# Patient Record
Sex: MALE | Race: WHITE | Employment: OTHER | URBAN - METROPOLITAN AREA
[De-identification: names, ages, dates, MRNs, and addresses within clinical notes are randomized per-mention and may not be internally consistent; named-entity substitution may affect disease eponyms.]

---

## 2017-01-03 ENCOUNTER — CARE COORDINATION (OUTPATIENT)
Dept: INTERNAL MEDICINE | Age: 82
End: 2017-01-03

## 2017-01-05 ENCOUNTER — TELEPHONE (OUTPATIENT)
Dept: INTERNAL MEDICINE | Age: 82
End: 2017-01-05

## 2017-01-19 ENCOUNTER — CARE COORDINATION (OUTPATIENT)
Dept: INTERNAL MEDICINE | Age: 82
End: 2017-01-19

## 2017-02-16 ENCOUNTER — CARE COORDINATION (OUTPATIENT)
Dept: INTERNAL MEDICINE | Age: 82
End: 2017-02-16

## 2017-03-15 ENCOUNTER — OFFICE VISIT (OUTPATIENT)
Dept: INTERNAL MEDICINE | Age: 82
End: 2017-03-15

## 2017-03-15 VITALS — HEIGHT: 68 IN | HEART RATE: 72 BPM | SYSTOLIC BLOOD PRESSURE: 118 MMHG | DIASTOLIC BLOOD PRESSURE: 58 MMHG

## 2017-03-15 DIAGNOSIS — E78.5 HYPERLIPIDEMIA WITH TARGET LDL LESS THAN 100: ICD-10-CM

## 2017-03-15 DIAGNOSIS — I25.83 CORONARY ARTERY DISEASE DUE TO LIPID RICH PLAQUE: ICD-10-CM

## 2017-03-15 DIAGNOSIS — I48.91 ATRIAL FIBRILLATION, UNSPECIFIED TYPE (HCC): ICD-10-CM

## 2017-03-15 DIAGNOSIS — I48.92 ATRIAL FLUTTER, UNSPECIFIED TYPE (HCC): ICD-10-CM

## 2017-03-15 DIAGNOSIS — I48.91 ATRIAL FIBRILLATION AND FLUTTER (HCC): ICD-10-CM

## 2017-03-15 DIAGNOSIS — I65.23 CAROTID STENOSIS, BILATERAL: ICD-10-CM

## 2017-03-15 DIAGNOSIS — I48.92 ATRIAL FIBRILLATION AND FLUTTER (HCC): ICD-10-CM

## 2017-03-15 DIAGNOSIS — I10 ESSENTIAL HYPERTENSION: ICD-10-CM

## 2017-03-15 DIAGNOSIS — I25.10 CORONARY ARTERY DISEASE DUE TO LIPID RICH PLAQUE: ICD-10-CM

## 2017-03-15 PROCEDURE — 99214 OFFICE O/P EST MOD 30 MIN: CPT | Performed by: INTERNAL MEDICINE

## 2017-03-15 RX ORDER — ATORVASTATIN CALCIUM 80 MG/1
80 TABLET, FILM COATED ORAL DAILY
Qty: 30 TABLET | Refills: 11 | Status: SHIPPED | OUTPATIENT
Start: 2017-03-15 | End: 2018-04-29 | Stop reason: SDUPTHER

## 2017-03-15 ASSESSMENT — ENCOUNTER SYMPTOMS
GASTROINTESTINAL NEGATIVE: 1
SHORTNESS OF BREATH: 0

## 2017-03-15 ASSESSMENT — PATIENT HEALTH QUESTIONNAIRE - PHQ9
2. FEELING DOWN, DEPRESSED OR HOPELESS: 0
SUM OF ALL RESPONSES TO PHQ9 QUESTIONS 1 & 2: 0
1. LITTLE INTEREST OR PLEASURE IN DOING THINGS: 0
SUM OF ALL RESPONSES TO PHQ QUESTIONS 1-9: 0

## 2017-03-23 ENCOUNTER — CARE COORDINATION (OUTPATIENT)
Dept: INTERNAL MEDICINE | Age: 82
End: 2017-03-23

## 2017-04-10 ENCOUNTER — CARE COORDINATION (OUTPATIENT)
Dept: OTHER | Facility: CLINIC | Age: 82
End: 2017-04-10

## 2017-04-18 ENCOUNTER — CARE COORDINATION (OUTPATIENT)
Dept: OTHER | Facility: CLINIC | Age: 82
End: 2017-04-18

## 2017-04-24 ENCOUNTER — CARE COORDINATION (OUTPATIENT)
Dept: OTHER | Facility: CLINIC | Age: 82
End: 2017-04-24

## 2017-05-01 ENCOUNTER — CARE COORDINATION (OUTPATIENT)
Dept: OTHER | Facility: CLINIC | Age: 82
End: 2017-05-01

## 2017-05-08 ENCOUNTER — CARE COORDINATION (OUTPATIENT)
Dept: OTHER | Facility: CLINIC | Age: 82
End: 2017-05-08

## 2017-05-15 ENCOUNTER — CARE COORDINATION (OUTPATIENT)
Dept: OTHER | Facility: CLINIC | Age: 82
End: 2017-05-15

## 2017-05-22 ENCOUNTER — CARE COORDINATION (OUTPATIENT)
Dept: OTHER | Facility: CLINIC | Age: 82
End: 2017-05-22

## 2017-05-30 ENCOUNTER — CARE COORDINATION (OUTPATIENT)
Dept: OTHER | Facility: CLINIC | Age: 82
End: 2017-05-30

## 2017-06-12 ENCOUNTER — CARE COORDINATION (OUTPATIENT)
Dept: OTHER | Facility: CLINIC | Age: 82
End: 2017-06-12

## 2017-06-14 ENCOUNTER — TELEPHONE (OUTPATIENT)
Dept: INTERNAL MEDICINE | Age: 82
End: 2017-06-14

## 2017-06-16 ENCOUNTER — CARE COORDINATION (OUTPATIENT)
Dept: CASE MANAGEMENT | Age: 82
End: 2017-06-16

## 2017-06-19 ENCOUNTER — TELEPHONE (OUTPATIENT)
Dept: INTERNAL MEDICINE | Age: 82
End: 2017-06-19

## 2017-06-21 ENCOUNTER — TELEPHONE (OUTPATIENT)
Dept: INTERNAL MEDICINE | Age: 82
End: 2017-06-21

## 2017-06-26 ENCOUNTER — CARE COORDINATION (OUTPATIENT)
Dept: CASE MANAGEMENT | Age: 82
End: 2017-06-26

## 2017-07-03 ENCOUNTER — CARE COORDINATION (OUTPATIENT)
Dept: CASE MANAGEMENT | Age: 82
End: 2017-07-03

## 2017-07-05 ENCOUNTER — OFFICE VISIT (OUTPATIENT)
Dept: INTERNAL MEDICINE | Age: 82
End: 2017-07-05

## 2017-07-05 VITALS — HEART RATE: 76 BPM | SYSTOLIC BLOOD PRESSURE: 100 MMHG | DIASTOLIC BLOOD PRESSURE: 58 MMHG

## 2017-07-05 DIAGNOSIS — H61.23 BILATERAL HEARING LOSS DUE TO CERUMEN IMPACTION: ICD-10-CM

## 2017-07-05 DIAGNOSIS — I25.83 CORONARY ARTERY DISEASE DUE TO LIPID RICH PLAQUE: ICD-10-CM

## 2017-07-05 DIAGNOSIS — I25.10 CORONARY ARTERY DISEASE DUE TO LIPID RICH PLAQUE: ICD-10-CM

## 2017-07-05 DIAGNOSIS — R26.2 DIFFICULTY WALKING: ICD-10-CM

## 2017-07-05 DIAGNOSIS — R53.1 GENERALIZED WEAKNESS: ICD-10-CM

## 2017-07-05 DIAGNOSIS — I48.91 ATRIAL FIBRILLATION AND FLUTTER (HCC): ICD-10-CM

## 2017-07-05 DIAGNOSIS — Z09 HOSPITAL DISCHARGE FOLLOW-UP: Primary | ICD-10-CM

## 2017-07-05 DIAGNOSIS — I48.92 ATRIAL FIBRILLATION AND FLUTTER (HCC): ICD-10-CM

## 2017-07-05 PROCEDURE — 3288F FALL RISK ASSESSMENT DOCD: CPT | Performed by: INTERNAL MEDICINE

## 2017-07-05 PROCEDURE — G8510 SCR DEP NEG, NO PLAN REQD: HCPCS | Performed by: INTERNAL MEDICINE

## 2017-07-05 PROCEDURE — 99215 OFFICE O/P EST HI 40 MIN: CPT | Performed by: INTERNAL MEDICINE

## 2017-07-05 PROCEDURE — 69210 REMOVE IMPACTED EAR WAX UNI: CPT | Performed by: INTERNAL MEDICINE

## 2017-07-05 RX ORDER — TICAGRELOR 90 MG/1
TABLET ORAL
COMMUNITY
Start: 2017-06-27 | End: 2017-07-26 | Stop reason: SDUPTHER

## 2017-07-05 RX ORDER — FINASTERIDE 5 MG/1
TABLET, FILM COATED ORAL
Qty: 90 TABLET | Refills: 3 | Status: SHIPPED | OUTPATIENT
Start: 2017-07-05 | End: 2018-08-13 | Stop reason: SDUPTHER

## 2017-07-05 ASSESSMENT — PATIENT HEALTH QUESTIONNAIRE - PHQ9
2. FEELING DOWN, DEPRESSED OR HOPELESS: 0
SUM OF ALL RESPONSES TO PHQ QUESTIONS 1-9: 0
SUM OF ALL RESPONSES TO PHQ9 QUESTIONS 1 & 2: 0
1. LITTLE INTEREST OR PLEASURE IN DOING THINGS: 0

## 2017-07-05 ASSESSMENT — ENCOUNTER SYMPTOMS
GASTROINTESTINAL NEGATIVE: 1
SHORTNESS OF BREATH: 0
EYES NEGATIVE: 1

## 2017-07-10 ENCOUNTER — CARE COORDINATION (OUTPATIENT)
Dept: INTERNAL MEDICINE | Age: 82
End: 2017-07-10

## 2017-07-24 ENCOUNTER — CARE COORDINATION (OUTPATIENT)
Dept: INTERNAL MEDICINE | Age: 82
End: 2017-07-24

## 2017-07-26 RX ORDER — TICAGRELOR 90 MG/1
90 TABLET ORAL 2 TIMES DAILY
Qty: 60 TABLET | Refills: 0 | Status: SHIPPED | OUTPATIENT
Start: 2017-07-26 | End: 2018-06-05

## 2017-07-28 DIAGNOSIS — I65.23 CAROTID STENOSIS, BILATERAL: Primary | ICD-10-CM

## 2017-07-28 DIAGNOSIS — I25.83 CORONARY ARTERY DISEASE DUE TO LIPID RICH PLAQUE: ICD-10-CM

## 2017-07-28 DIAGNOSIS — I25.10 CORONARY ARTERY DISEASE DUE TO LIPID RICH PLAQUE: ICD-10-CM

## 2017-07-28 DIAGNOSIS — E78.5 HYPERLIPIDEMIA WITH TARGET LDL LESS THAN 100: ICD-10-CM

## 2017-07-28 DIAGNOSIS — I35.1 MODERATE AORTIC INSUFFICIENCY: Chronic | ICD-10-CM

## 2017-07-28 DIAGNOSIS — I48.20 CHRONIC ATRIAL FIBRILLATION (HCC): ICD-10-CM

## 2017-07-28 DIAGNOSIS — I10 ESSENTIAL HYPERTENSION: ICD-10-CM

## 2017-07-28 DIAGNOSIS — I48.92 ATRIAL FIBRILLATION AND FLUTTER (HCC): ICD-10-CM

## 2017-07-28 DIAGNOSIS — I48.91 ATRIAL FIBRILLATION AND FLUTTER (HCC): ICD-10-CM

## 2017-07-28 PROBLEM — Z92.89 H/O ANGIOGRAPHY: Status: ACTIVE | Noted: 2017-07-28

## 2017-08-07 ENCOUNTER — CARE COORDINATION (OUTPATIENT)
Dept: INTERNAL MEDICINE | Age: 82
End: 2017-08-07

## 2017-08-25 ENCOUNTER — CARE COORDINATION (OUTPATIENT)
Dept: INTERNAL MEDICINE | Age: 82
End: 2017-08-25

## 2017-10-04 ENCOUNTER — OFFICE VISIT (OUTPATIENT)
Dept: INTERNAL MEDICINE | Age: 82
End: 2017-10-04

## 2017-10-04 VITALS — SYSTOLIC BLOOD PRESSURE: 138 MMHG | DIASTOLIC BLOOD PRESSURE: 80 MMHG | HEART RATE: 80 BPM

## 2017-10-04 DIAGNOSIS — I48.20 CHRONIC ATRIAL FIBRILLATION (HCC): ICD-10-CM

## 2017-10-04 DIAGNOSIS — R60.9 EDEMA, UNSPECIFIED TYPE: ICD-10-CM

## 2017-10-04 DIAGNOSIS — I69.30 MULTI-INFARCT STATE: ICD-10-CM

## 2017-10-04 DIAGNOSIS — Z23 NEED FOR INFLUENZA VACCINATION: Primary | ICD-10-CM

## 2017-10-04 DIAGNOSIS — R26.2 DIFFICULTY WALKING: ICD-10-CM

## 2017-10-04 PROBLEM — Z92.89 H/O ANGIOGRAPHY: Status: RESOLVED | Noted: 2017-07-28 | Resolved: 2017-10-04

## 2017-10-04 PROBLEM — Z09 HOSPITAL DISCHARGE FOLLOW-UP: Status: RESOLVED | Noted: 2017-07-05 | Resolved: 2017-10-04

## 2017-10-04 PROCEDURE — 90662 IIV NO PRSV INCREASED AG IM: CPT | Performed by: INTERNAL MEDICINE

## 2017-10-04 PROCEDURE — 99214 OFFICE O/P EST MOD 30 MIN: CPT | Performed by: INTERNAL MEDICINE

## 2017-10-04 PROCEDURE — G0008 ADMIN INFLUENZA VIRUS VAC: HCPCS | Performed by: INTERNAL MEDICINE

## 2017-10-04 RX ORDER — FUROSEMIDE 20 MG/1
20 TABLET ORAL DAILY
Qty: 60 TABLET | Refills: 3 | Status: SHIPPED | OUTPATIENT
Start: 2017-10-04 | End: 2017-11-29 | Stop reason: SDUPTHER

## 2017-10-04 ASSESSMENT — ENCOUNTER SYMPTOMS
SHORTNESS OF BREATH: 0
TROUBLE SWALLOWING: 0

## 2017-10-04 NOTE — ASSESSMENT & PLAN NOTE
Bilateral lower extremitiesparticularly ankles and feet and just above. S2 is difficulty walking. We'll try Lasix 20 mg a day. He has compression doses wife tries to put on every morning.

## 2017-10-04 NOTE — PROGRESS NOTES
DOSE, 65 YRS +, IM, PF, PREFILL SYR, 0.5ML (FLUZONE HD)       PSH, PMH, SH and FH reviewed and noted. Recent and past labs, tests and consults also reviewed. Recent or new meds also reviewed.

## 2017-10-05 ENCOUNTER — TELEPHONE (OUTPATIENT)
Dept: INTERNAL MEDICINE | Age: 82
End: 2017-10-05

## 2017-10-05 DIAGNOSIS — R05.3 PERSISTENT COUGH: Primary | ICD-10-CM

## 2017-10-05 NOTE — TELEPHONE ENCOUNTER
Call  Home phone and  hung up on me as did not understand. Called cell and voice mailbox not set up yet.

## 2017-10-05 NOTE — TELEPHONE ENCOUNTER
Patient's wife Vinod Vega called stating her  cough all night long and is asking for medication for coughing   Pt was seen yesterday   If you call the home and don't reach anyone please call the cell [phone]  [facility]

## 2017-10-12 ENCOUNTER — HOSPITAL ENCOUNTER (OUTPATIENT)
Dept: OTHER | Age: 82
Discharge: HOME | End: 2017-10-12
Attending: INTERNAL MEDICINE | Admitting: INTERNAL MEDICINE

## 2017-10-12 DIAGNOSIS — R05.3 PERSISTENT COUGH: ICD-10-CM

## 2017-11-29 ENCOUNTER — OFFICE VISIT (OUTPATIENT)
Dept: INTERNAL MEDICINE | Age: 82
End: 2017-11-29

## 2017-11-29 VITALS — DIASTOLIC BLOOD PRESSURE: 68 MMHG | SYSTOLIC BLOOD PRESSURE: 110 MMHG | HEART RATE: 76 BPM

## 2017-11-29 DIAGNOSIS — I48.20 CHRONIC ATRIAL FIBRILLATION (HCC): ICD-10-CM

## 2017-11-29 DIAGNOSIS — I10 ESSENTIAL HYPERTENSION: ICD-10-CM

## 2017-11-29 DIAGNOSIS — R60.9 EDEMA, UNSPECIFIED TYPE: Primary | ICD-10-CM

## 2017-11-29 DIAGNOSIS — E78.5 HYPERLIPIDEMIA WITH TARGET LDL LESS THAN 100: ICD-10-CM

## 2017-11-29 PROCEDURE — G8421 BMI NOT CALCULATED: HCPCS | Performed by: INTERNAL MEDICINE

## 2017-11-29 PROCEDURE — G8598 ASA/ANTIPLAT THER USED: HCPCS | Performed by: INTERNAL MEDICINE

## 2017-11-29 PROCEDURE — 1123F ACP DISCUSS/DSCN MKR DOCD: CPT | Performed by: INTERNAL MEDICINE

## 2017-11-29 PROCEDURE — 99214 OFFICE O/P EST MOD 30 MIN: CPT | Performed by: INTERNAL MEDICINE

## 2017-11-29 PROCEDURE — G8484 FLU IMMUNIZE NO ADMIN: HCPCS | Performed by: INTERNAL MEDICINE

## 2017-11-29 PROCEDURE — 4040F PNEUMOC VAC/ADMIN/RCVD: CPT | Performed by: INTERNAL MEDICINE

## 2017-11-29 PROCEDURE — 1036F TOBACCO NON-USER: CPT | Performed by: INTERNAL MEDICINE

## 2017-11-29 PROCEDURE — G8427 DOCREV CUR MEDS BY ELIG CLIN: HCPCS | Performed by: INTERNAL MEDICINE

## 2017-11-29 RX ORDER — FUROSEMIDE 20 MG/1
20 TABLET ORAL DAILY
Qty: 90 TABLET | Refills: 3 | Status: SHIPPED | OUTPATIENT
Start: 2017-11-29 | End: 2018-02-28 | Stop reason: SDUPTHER

## 2017-11-29 ASSESSMENT — ENCOUNTER SYMPTOMS
SHORTNESS OF BREATH: 0
GASTROINTESTINAL NEGATIVE: 1

## 2017-11-29 NOTE — PROGRESS NOTES
SUBJECTIVE:    Patient ID: Abhinav Garcia is an 80 y.o. male. HPI: Patient here today for the f/u of chronic problems -- see Problem List and associated comments. New issues or complaints include (also see Assessment for more details): Follow-up on his edema with the use of Lasix. He is doing better. Feels well. His wife offers no other complaints or issues at this time. Review of Systems   Constitutional: Negative for activity change and appetite change. Respiratory: Negative for shortness of breath. Cardiovascular: Positive for leg swelling. Negative for chest pain. Gastrointestinal: Negative. Genitourinary: Negative for difficulty urinating. Neurological: Negative for dizziness. Psychiatric/Behavioral: Negative for agitation. OBJECTIVE:    /68 (Site: Left Arm, Position: Sitting, Cuff Size: Medium Adult)   Pulse 76      Physical Exam   Constitutional: He appears well-developed and well-nourished. In wheelchair   Neck: No JVD present. Cardiovascular: Normal rate. An irregular rhythm present. Pulses:       Radial pulses are 2+ on the right side, and 2+ on the left side. Pulmonary/Chest: Effort normal and breath sounds normal. No respiratory distress. He has no wheezes. Abdominal: Soft. Bowel sounds are normal.   Musculoskeletal: He exhibits edema. Neurological: He is alert. Skin: He is not diaphoretic. No pallor. Psychiatric: His mood appears not anxious. His affect is not angry. He is not agitated, not actively hallucinating and not combative. Thought content is not delusional. Cognition and memory are impaired. He does not exhibit a depressed mood. ASSESSMENT:       Encounter Diagnoses   Name Primary?  Edema, unspecified type Yes    Hyperlipidemia with target LDL less than 100     Essential hypertension     Chronic atrial fibrillation (HCC)        Edema  Improved on Lasix 20 mg daily.  Still some edema but not nearly as prominent and his wife is able to get his compression hose on easier now. We'll continue at 20 mg. Labs in follow-up in 4 months. Chronic atrial fibrillation (HCC)  Stablerate control and anticoagulation    Hypertension  BP OK. Hyperlipidemia with target LDL less than 100  Check labs in 4 monthscontinue Rx        PLAN:  See ASSESSMENT for evaluation & PLAN    Orders Placed This Encounter   Procedures    CBC Auto Differential     Standing Status:   Future     Standing Expiration Date:   11/29/2018    Comprehensive Metabolic Panel     Standing Status:   Future     Standing Expiration Date:   11/29/2018    Lipid Panel     Standing Status:   Future     Standing Expiration Date:   11/29/2018     Order Specific Question:   Is Patient Fasting?/# of Hours     Answer:   yes - 8 hours       PSH, PMH, SH and FH reviewed and noted. Recent and past labs, tests and consults also reviewed. Recent or new meds also reviewed.

## 2018-02-26 DIAGNOSIS — I10 ESSENTIAL HYPERTENSION: ICD-10-CM

## 2018-02-26 DIAGNOSIS — E78.5 HYPERLIPIDEMIA WITH TARGET LDL LESS THAN 100: ICD-10-CM

## 2018-02-26 LAB
A/G RATIO: 1.8 (ref 1.1–2.2)
ALBUMIN SERPL-MCNC: 3.6 G/DL (ref 3.4–5)
ALP BLD-CCNC: 138 U/L (ref 40–129)
ALT SERPL-CCNC: 9 U/L (ref 10–40)
ANION GAP SERPL CALCULATED.3IONS-SCNC: 10 MMOL/L (ref 3–16)
AST SERPL-CCNC: 17 U/L (ref 15–37)
BASOPHILS ABSOLUTE: 0.1 K/UL (ref 0–0.2)
BASOPHILS RELATIVE PERCENT: 0.7 %
BILIRUB SERPL-MCNC: 0.9 MG/DL (ref 0–1)
BUN BLDV-MCNC: 7 MG/DL (ref 7–20)
CALCIUM SERPL-MCNC: 8.7 MG/DL (ref 8.3–10.6)
CHLORIDE BLD-SCNC: 103 MMOL/L (ref 99–110)
CHOLESTEROL, TOTAL: 103 MG/DL (ref 0–199)
CO2: 31 MMOL/L (ref 21–32)
CREAT SERPL-MCNC: 0.6 MG/DL (ref 0.8–1.3)
EOSINOPHILS ABSOLUTE: 0.3 K/UL (ref 0–0.6)
EOSINOPHILS RELATIVE PERCENT: 3.9 %
GFR AFRICAN AMERICAN: >60
GFR NON-AFRICAN AMERICAN: >60
GLOBULIN: 2 G/DL
GLUCOSE BLD-MCNC: 92 MG/DL (ref 70–99)
HCT VFR BLD CALC: 40.1 % (ref 40.5–52.5)
HDLC SERPL-MCNC: 47 MG/DL (ref 40–60)
HEMOGLOBIN: 13.7 G/DL (ref 13.5–17.5)
LDL CHOLESTEROL CALCULATED: 42 MG/DL
LYMPHOCYTES ABSOLUTE: 1.2 K/UL (ref 1–5.1)
LYMPHOCYTES RELATIVE PERCENT: 16.3 %
MCH RBC QN AUTO: 29.2 PG (ref 26–34)
MCHC RBC AUTO-ENTMCNC: 34.1 G/DL (ref 31–36)
MCV RBC AUTO: 85.8 FL (ref 80–100)
MONOCYTES ABSOLUTE: 0.8 K/UL (ref 0–1.3)
MONOCYTES RELATIVE PERCENT: 10.6 %
NEUTROPHILS ABSOLUTE: 4.9 K/UL (ref 1.7–7.7)
NEUTROPHILS RELATIVE PERCENT: 68.5 %
PDW BLD-RTO: 16.2 % (ref 12.4–15.4)
PLATELET # BLD: 187 K/UL (ref 135–450)
PMV BLD AUTO: 8.3 FL (ref 5–10.5)
POTASSIUM SERPL-SCNC: 3.7 MMOL/L (ref 3.5–5.1)
RBC # BLD: 4.68 M/UL (ref 4.2–5.9)
SODIUM BLD-SCNC: 144 MMOL/L (ref 136–145)
TOTAL PROTEIN: 5.6 G/DL (ref 6.4–8.2)
TRIGL SERPL-MCNC: 70 MG/DL (ref 0–150)
VLDLC SERPL CALC-MCNC: 14 MG/DL
WBC # BLD: 7.1 K/UL (ref 4–11)

## 2018-02-28 ENCOUNTER — OFFICE VISIT (OUTPATIENT)
Dept: INTERNAL MEDICINE | Age: 83
End: 2018-02-28

## 2018-02-28 VITALS — SYSTOLIC BLOOD PRESSURE: 130 MMHG | HEART RATE: 76 BPM | DIASTOLIC BLOOD PRESSURE: 70 MMHG

## 2018-02-28 DIAGNOSIS — I25.83 CORONARY ARTERY DISEASE DUE TO LIPID RICH PLAQUE: ICD-10-CM

## 2018-02-28 DIAGNOSIS — H61.23 BILATERAL HEARING LOSS DUE TO CERUMEN IMPACTION: ICD-10-CM

## 2018-02-28 DIAGNOSIS — I10 ESSENTIAL HYPERTENSION: ICD-10-CM

## 2018-02-28 DIAGNOSIS — R60.9 EDEMA, UNSPECIFIED TYPE: ICD-10-CM

## 2018-02-28 DIAGNOSIS — I25.10 CORONARY ARTERY DISEASE DUE TO LIPID RICH PLAQUE: ICD-10-CM

## 2018-02-28 DIAGNOSIS — I48.20 CHRONIC ATRIAL FIBRILLATION (HCC): Primary | ICD-10-CM

## 2018-02-28 DIAGNOSIS — E78.5 HYPERLIPIDEMIA WITH TARGET LDL LESS THAN 100: ICD-10-CM

## 2018-02-28 DIAGNOSIS — R26.2 DIFFICULTY WALKING: ICD-10-CM

## 2018-02-28 PROCEDURE — 69210 REMOVE IMPACTED EAR WAX UNI: CPT | Performed by: INTERNAL MEDICINE

## 2018-02-28 PROCEDURE — G8427 DOCREV CUR MEDS BY ELIG CLIN: HCPCS | Performed by: INTERNAL MEDICINE

## 2018-02-28 PROCEDURE — 99215 OFFICE O/P EST HI 40 MIN: CPT | Performed by: INTERNAL MEDICINE

## 2018-02-28 PROCEDURE — G8484 FLU IMMUNIZE NO ADMIN: HCPCS | Performed by: INTERNAL MEDICINE

## 2018-02-28 PROCEDURE — 1036F TOBACCO NON-USER: CPT | Performed by: INTERNAL MEDICINE

## 2018-02-28 PROCEDURE — G8421 BMI NOT CALCULATED: HCPCS | Performed by: INTERNAL MEDICINE

## 2018-02-28 PROCEDURE — 1123F ACP DISCUSS/DSCN MKR DOCD: CPT | Performed by: INTERNAL MEDICINE

## 2018-02-28 PROCEDURE — G8598 ASA/ANTIPLAT THER USED: HCPCS | Performed by: INTERNAL MEDICINE

## 2018-02-28 PROCEDURE — 4040F PNEUMOC VAC/ADMIN/RCVD: CPT | Performed by: INTERNAL MEDICINE

## 2018-02-28 RX ORDER — FUROSEMIDE 20 MG/1
20 TABLET ORAL DAILY
Qty: 90 TABLET | Refills: 3 | Status: SHIPPED | OUTPATIENT
Start: 2018-02-28 | End: 2018-06-05

## 2018-02-28 ASSESSMENT — ENCOUNTER SYMPTOMS
GASTROINTESTINAL NEGATIVE: 1
CHEST TIGHTNESS: 0

## 2018-02-28 NOTE — PROGRESS NOTES
SUBJECTIVE:    Patient ID: Nathalie Jimenez is an 80 y.o. male. HPI: Patient here today for the f/u of chronic problems -- see Problem List and associated comments. New issues or complaints include (also see Assessment for more details):  Patient here for follow-up on labs. Overall doing well. Rare and very atypical left chest pain. He and his wife both agree that he is doing very well. The edema is improved but still there. He is having some hearing problems due to wax buildup which will be removed. Still has trouble ambulating and stays primarily in a wheelchair. Also has dental problems and he may have some extractions soon. Review of Systems   Constitutional: Negative for activity change and appetite change. HENT: Positive for dental problem and hearing loss. Eyes: Negative for visual disturbance. Respiratory: Negative for chest tightness. Cardiovascular: Positive for chest pain and leg swelling. Negative for palpitations. Gastrointestinal: Negative. Genitourinary: Negative for difficulty urinating. Musculoskeletal: Negative for arthralgias. Skin: Negative for rash and wound. Neurological: Negative for tremors and headaches. Psychiatric/Behavioral: Negative. OBJECTIVE:    /70 (Site: Left Arm, Position: Sitting, Cuff Size: Medium Adult)   Pulse 76      Physical Exam   Constitutional: He is oriented to person, place, and time. He appears well-developed and well-nourished. And wheelchair   HENT:   Mouth/Throat: Oropharynx is clear and moist. No oropharyngeal exudate. Bilateral cerumen cleaned  Very poor dentition   Eyes: EOM are normal. Right eye exhibits no discharge. Left eye exhibits no discharge. No scleral icterus. Neck: Normal range of motion. Neck supple. No JVD present. Carotid bruit is present (Very soft). Cardiovascular: Normal rate. An irregular rhythm present. Exam reveals no gallop. Murmur heard.   Pulses:       Carotid pulses are 2+ on the right

## 2018-04-11 ENCOUNTER — TELEPHONE (OUTPATIENT)
Dept: INTERNAL MEDICINE | Age: 83
End: 2018-04-11

## 2018-04-30 RX ORDER — ATORVASTATIN CALCIUM 80 MG/1
TABLET, FILM COATED ORAL
Qty: 90 TABLET | Refills: 10 | Status: ON HOLD | OUTPATIENT
Start: 2018-04-30 | End: 2018-08-17 | Stop reason: HOSPADM

## 2018-06-05 ENCOUNTER — OFFICE VISIT (OUTPATIENT)
Dept: INTERNAL MEDICINE | Age: 83
End: 2018-06-05

## 2018-06-05 VITALS
DIASTOLIC BLOOD PRESSURE: 70 MMHG | BODY MASS INDEX: 22.2 KG/M2 | SYSTOLIC BLOOD PRESSURE: 100 MMHG | HEART RATE: 64 BPM | WEIGHT: 146 LBS

## 2018-06-05 DIAGNOSIS — R60.9 EDEMA, UNSPECIFIED TYPE: ICD-10-CM

## 2018-06-05 DIAGNOSIS — Z00.00 PREVENTATIVE HEALTH CARE: Primary | ICD-10-CM

## 2018-06-05 DIAGNOSIS — K27.9 PUD (PEPTIC ULCER DISEASE): ICD-10-CM

## 2018-06-05 DIAGNOSIS — E78.5 HYPERLIPIDEMIA WITH TARGET LDL LESS THAN 100: ICD-10-CM

## 2018-06-05 DIAGNOSIS — Z00.00 PREVENTATIVE HEALTH CARE: ICD-10-CM

## 2018-06-05 DIAGNOSIS — I48.20 CHRONIC ATRIAL FIBRILLATION (HCC): ICD-10-CM

## 2018-06-05 DIAGNOSIS — I10 ESSENTIAL HYPERTENSION: ICD-10-CM

## 2018-06-05 LAB
A/G RATIO: 1.7 (ref 1.1–2.2)
ALBUMIN SERPL-MCNC: 3.6 G/DL (ref 3.4–5)
ALP BLD-CCNC: 158 U/L (ref 40–129)
ALT SERPL-CCNC: 11 U/L (ref 10–40)
ANION GAP SERPL CALCULATED.3IONS-SCNC: 13 MMOL/L (ref 3–16)
AST SERPL-CCNC: 17 U/L (ref 15–37)
BASOPHILS ABSOLUTE: 0.1 K/UL (ref 0–0.2)
BASOPHILS RELATIVE PERCENT: 1 %
BILIRUB SERPL-MCNC: 0.8 MG/DL (ref 0–1)
BUN BLDV-MCNC: 8 MG/DL (ref 7–20)
CALCIUM SERPL-MCNC: 9 MG/DL (ref 8.3–10.6)
CHLORIDE BLD-SCNC: 101 MMOL/L (ref 99–110)
CHOLESTEROL, TOTAL: 94 MG/DL (ref 0–199)
CO2: 28 MMOL/L (ref 21–32)
CREAT SERPL-MCNC: 0.7 MG/DL (ref 0.8–1.3)
EOSINOPHILS ABSOLUTE: 0.3 K/UL (ref 0–0.6)
EOSINOPHILS RELATIVE PERCENT: 4 %
GFR AFRICAN AMERICAN: >60
GFR NON-AFRICAN AMERICAN: >60
GLOBULIN: 2.1 G/DL
GLUCOSE BLD-MCNC: 93 MG/DL (ref 70–99)
HCT VFR BLD CALC: 40 % (ref 40.5–52.5)
HDLC SERPL-MCNC: 48 MG/DL (ref 40–60)
HEMOGLOBIN: 13.7 G/DL (ref 13.5–17.5)
LDL CHOLESTEROL CALCULATED: 35 MG/DL
LYMPHOCYTES ABSOLUTE: 1.3 K/UL (ref 1–5.1)
LYMPHOCYTES RELATIVE PERCENT: 18.7 %
MCH RBC QN AUTO: 30.2 PG (ref 26–34)
MCHC RBC AUTO-ENTMCNC: 34.2 G/DL (ref 31–36)
MCV RBC AUTO: 88.3 FL (ref 80–100)
MONOCYTES ABSOLUTE: 0.6 K/UL (ref 0–1.3)
MONOCYTES RELATIVE PERCENT: 9 %
NEUTROPHILS ABSOLUTE: 4.6 K/UL (ref 1.7–7.7)
NEUTROPHILS RELATIVE PERCENT: 67.3 %
PDW BLD-RTO: 16.4 % (ref 12.4–15.4)
PLATELET # BLD: 194 K/UL (ref 135–450)
PMV BLD AUTO: 8.6 FL (ref 5–10.5)
POTASSIUM SERPL-SCNC: 3.8 MMOL/L (ref 3.5–5.1)
RBC # BLD: 4.53 M/UL (ref 4.2–5.9)
SODIUM BLD-SCNC: 142 MMOL/L (ref 136–145)
T4 FREE: 1.5 NG/DL (ref 0.9–1.8)
TOTAL PROTEIN: 5.7 G/DL (ref 6.4–8.2)
TRIGL SERPL-MCNC: 54 MG/DL (ref 0–150)
TSH REFLEX FT4: 7.69 UIU/ML (ref 0.27–4.2)
VLDLC SERPL CALC-MCNC: 11 MG/DL
WBC # BLD: 6.8 K/UL (ref 4–11)

## 2018-06-05 PROCEDURE — G0439 PPPS, SUBSEQ VISIT: HCPCS | Performed by: INTERNAL MEDICINE

## 2018-06-05 PROCEDURE — 1123F ACP DISCUSS/DSCN MKR DOCD: CPT | Performed by: INTERNAL MEDICINE

## 2018-06-05 PROCEDURE — G8420 CALC BMI NORM PARAMETERS: HCPCS | Performed by: INTERNAL MEDICINE

## 2018-06-05 PROCEDURE — G8427 DOCREV CUR MEDS BY ELIG CLIN: HCPCS | Performed by: INTERNAL MEDICINE

## 2018-06-05 PROCEDURE — 99214 OFFICE O/P EST MOD 30 MIN: CPT | Performed by: INTERNAL MEDICINE

## 2018-06-05 PROCEDURE — 4040F PNEUMOC VAC/ADMIN/RCVD: CPT | Performed by: INTERNAL MEDICINE

## 2018-06-05 PROCEDURE — G8598 ASA/ANTIPLAT THER USED: HCPCS | Performed by: INTERNAL MEDICINE

## 2018-06-05 PROCEDURE — 1036F TOBACCO NON-USER: CPT | Performed by: INTERNAL MEDICINE

## 2018-06-05 RX ORDER — FUROSEMIDE 40 MG/1
40 TABLET ORAL DAILY
Qty: 90 TABLET | Refills: 3 | Status: SHIPPED | OUTPATIENT
Start: 2018-06-05 | End: 2018-09-25 | Stop reason: SDUPTHER

## 2018-06-05 ASSESSMENT — ENCOUNTER SYMPTOMS
SHORTNESS OF BREATH: 1
GASTROINTESTINAL NEGATIVE: 1
CHEST TIGHTNESS: 0

## 2018-06-12 DIAGNOSIS — R74.8 ELEVATED ALKALINE PHOSPHATASE LEVEL: Primary | ICD-10-CM

## 2018-06-14 DIAGNOSIS — R74.8 ELEVATED ALKALINE PHOSPHATASE LEVEL: ICD-10-CM

## 2018-06-17 LAB
ALK PHOS OTHER CALC: 0 U/L
ALK PHOSPHATASE: 172 U/L (ref 40–120)
ALKALINE PHOSPHATASE BONE FRACTION: 31 U/L (ref 0–55)
ALKALINE PHOSPHATASE LIVER FRACTION: 141 U/L (ref 0–94)

## 2018-06-19 DIAGNOSIS — R74.8 ELEVATED ALKALINE PHOSPHATASE LEVEL: Primary | ICD-10-CM

## 2018-06-27 ENCOUNTER — HOSPITAL ENCOUNTER (OUTPATIENT)
Dept: ULTRASOUND IMAGING | Age: 83
Discharge: HOME | End: 2018-06-27
Attending: INTERNAL MEDICINE | Admitting: INTERNAL MEDICINE

## 2018-06-27 DIAGNOSIS — R74.8 ELEVATED ALKALINE PHOSPHATASE LEVEL: ICD-10-CM

## 2018-06-27 DIAGNOSIS — R74.8 ABNORMAL LEVELS OF OTHER SERUM ENZYMES: ICD-10-CM

## 2018-07-05 PROBLEM — Z00.00 PREVENTATIVE HEALTH CARE: Status: RESOLVED | Noted: 2018-06-05 | Resolved: 2018-07-05

## 2018-08-13 RX ORDER — FINASTERIDE 5 MG/1
TABLET, FILM COATED ORAL
Qty: 90 TABLET | Refills: 3 | Status: SHIPPED | OUTPATIENT
Start: 2018-08-13 | End: 2019-09-24 | Stop reason: SDUPTHER

## 2018-08-14 ENCOUNTER — HOSPITAL ENCOUNTER (INPATIENT)
Age: 83
LOS: 3 days | Discharge: NURSING, REHAB OR LONG-TERM CARE | DRG: 389 | End: 2018-08-17
Attending: EMERGENCY MEDICINE | Admitting: HOSPITALIST
Payer: MEDICARE

## 2018-08-14 ENCOUNTER — APPOINTMENT (OUTPATIENT)
Dept: CT IMAGING | Age: 83
DRG: 389 | End: 2018-08-14
Payer: MEDICARE

## 2018-08-14 ENCOUNTER — APPOINTMENT (OUTPATIENT)
Dept: GENERAL RADIOLOGY | Age: 83
DRG: 389 | End: 2018-08-14
Payer: MEDICARE

## 2018-08-14 DIAGNOSIS — K56.609 SMALL BOWEL OBSTRUCTION (HCC): Primary | ICD-10-CM

## 2018-08-14 LAB
ALBUMIN SERPL-MCNC: 3.3 G/DL (ref 3.4–5)
ALP BLD-CCNC: 164 U/L (ref 40–129)
ALT SERPL-CCNC: 11 U/L (ref 10–40)
ANION GAP SERPL CALCULATED.3IONS-SCNC: 13 MMOL/L (ref 3–16)
AST SERPL-CCNC: 29 U/L (ref 15–37)
BASOPHILS ABSOLUTE: 0.1 K/UL (ref 0–0.2)
BASOPHILS RELATIVE PERCENT: 0.5 %
BILIRUB SERPL-MCNC: 1.2 MG/DL (ref 0–1)
BILIRUBIN DIRECT: <0.2 MG/DL (ref 0–0.3)
BILIRUBIN, INDIRECT: ABNORMAL MG/DL (ref 0–1)
BUN BLDV-MCNC: 14 MG/DL (ref 7–20)
CALCIUM SERPL-MCNC: 9.2 MG/DL (ref 8.3–10.6)
CHLORIDE BLD-SCNC: 105 MMOL/L (ref 99–110)
CO2: 24 MMOL/L (ref 21–32)
CREAT SERPL-MCNC: 0.8 MG/DL (ref 0.8–1.3)
EOSINOPHILS ABSOLUTE: 0.1 K/UL (ref 0–0.6)
EOSINOPHILS RELATIVE PERCENT: 0.5 %
GFR AFRICAN AMERICAN: >60
GFR NON-AFRICAN AMERICAN: >60
GLUCOSE BLD-MCNC: 146 MG/DL (ref 70–99)
HCT VFR BLD CALC: 45.3 % (ref 40.5–52.5)
HEMOGLOBIN: 14.9 G/DL (ref 13.5–17.5)
LACTIC ACID: 2.3 MMOL/L (ref 0.4–2)
LACTIC ACID: 3 MMOL/L (ref 0.4–2)
LACTIC ACID: 3 MMOL/L (ref 0.4–2)
LIPASE: 14 U/L (ref 13–60)
LYMPHOCYTES ABSOLUTE: 1 K/UL (ref 1–5.1)
LYMPHOCYTES RELATIVE PERCENT: 8.3 %
MCH RBC QN AUTO: 29.5 PG (ref 26–34)
MCHC RBC AUTO-ENTMCNC: 32.9 G/DL (ref 31–36)
MCV RBC AUTO: 89.7 FL (ref 80–100)
MONOCYTES ABSOLUTE: 1.2 K/UL (ref 0–1.3)
MONOCYTES RELATIVE PERCENT: 10.1 %
NEUTROPHILS ABSOLUTE: 9.6 K/UL (ref 1.7–7.7)
NEUTROPHILS RELATIVE PERCENT: 80.6 %
PDW BLD-RTO: 15.7 % (ref 12.4–15.4)
PLATELET # BLD: 204 K/UL (ref 135–450)
PMV BLD AUTO: 8.6 FL (ref 5–10.5)
POTASSIUM SERPL-SCNC: 4.5 MMOL/L (ref 3.5–5.1)
RBC # BLD: 5.05 M/UL (ref 4.2–5.9)
SODIUM BLD-SCNC: 142 MMOL/L (ref 136–145)
TOTAL PROTEIN: 6.3 G/DL (ref 6.4–8.2)
WBC # BLD: 12 K/UL (ref 4–11)

## 2018-08-14 PROCEDURE — 2580000003 HC RX 258: Performed by: INTERNAL MEDICINE

## 2018-08-14 PROCEDURE — 6360000002 HC RX W HCPCS: Performed by: SURGERY

## 2018-08-14 PROCEDURE — 83690 ASSAY OF LIPASE: CPT

## 2018-08-14 PROCEDURE — 71046 X-RAY EXAM CHEST 2 VIEWS: CPT

## 2018-08-14 PROCEDURE — 80048 BASIC METABOLIC PNL TOTAL CA: CPT

## 2018-08-14 PROCEDURE — 80076 HEPATIC FUNCTION PANEL: CPT

## 2018-08-14 PROCEDURE — 85025 COMPLETE CBC W/AUTO DIFF WBC: CPT

## 2018-08-14 PROCEDURE — 96374 THER/PROPH/DIAG INJ IV PUSH: CPT

## 2018-08-14 PROCEDURE — 99221 1ST HOSP IP/OBS SF/LOW 40: CPT | Performed by: SURGERY

## 2018-08-14 PROCEDURE — 74177 CT ABD & PELVIS W/CONTRAST: CPT

## 2018-08-14 PROCEDURE — 36415 COLL VENOUS BLD VENIPUNCTURE: CPT

## 2018-08-14 PROCEDURE — 99285 EMERGENCY DEPT VISIT HI MDM: CPT

## 2018-08-14 PROCEDURE — 83605 ASSAY OF LACTIC ACID: CPT

## 2018-08-14 PROCEDURE — 2580000003 HC RX 258: Performed by: SURGERY

## 2018-08-14 PROCEDURE — 6360000004 HC RX CONTRAST MEDICATION: Performed by: SURGERY

## 2018-08-14 PROCEDURE — 1200000000 HC SEMI PRIVATE

## 2018-08-14 PROCEDURE — 6360000004 HC RX CONTRAST MEDICATION: Performed by: EMERGENCY MEDICINE

## 2018-08-14 PROCEDURE — 99222 1ST HOSP IP/OBS MODERATE 55: CPT | Performed by: HOSPITALIST

## 2018-08-14 PROCEDURE — 6360000002 HC RX W HCPCS: Performed by: EMERGENCY MEDICINE

## 2018-08-14 PROCEDURE — C9113 INJ PANTOPRAZOLE SODIUM, VIA: HCPCS | Performed by: SURGERY

## 2018-08-14 RX ORDER — SODIUM CHLORIDE, SODIUM LACTATE, POTASSIUM CHLORIDE, AND CALCIUM CHLORIDE .6; .31; .03; .02 G/100ML; G/100ML; G/100ML; G/100ML
1000 INJECTION, SOLUTION INTRAVENOUS ONCE
Status: COMPLETED | OUTPATIENT
Start: 2018-08-14 | End: 2018-08-14

## 2018-08-14 RX ORDER — ATORVASTATIN CALCIUM 40 MG/1
40 TABLET, FILM COATED ORAL DAILY
COMMUNITY
End: 2019-09-05

## 2018-08-14 RX ORDER — SODIUM CHLORIDE 0.9 % (FLUSH) 0.9 %
10 SYRINGE (ML) INJECTION EVERY 12 HOURS SCHEDULED
Status: DISCONTINUED | OUTPATIENT
Start: 2018-08-14 | End: 2018-08-17 | Stop reason: HOSPADM

## 2018-08-14 RX ORDER — SODIUM CHLORIDE, SODIUM LACTATE, POTASSIUM CHLORIDE, CALCIUM CHLORIDE 600; 310; 30; 20 MG/100ML; MG/100ML; MG/100ML; MG/100ML
INJECTION, SOLUTION INTRAVENOUS CONTINUOUS
Status: DISCONTINUED | OUTPATIENT
Start: 2018-08-14 | End: 2018-08-15

## 2018-08-14 RX ORDER — SODIUM CHLORIDE 0.9 % (FLUSH) 0.9 %
10 SYRINGE (ML) INJECTION PRN
Status: DISCONTINUED | OUTPATIENT
Start: 2018-08-14 | End: 2018-08-17 | Stop reason: HOSPADM

## 2018-08-14 RX ORDER — PANTOPRAZOLE SODIUM 40 MG/10ML
40 INJECTION, POWDER, LYOPHILIZED, FOR SOLUTION INTRAVENOUS DAILY
Status: DISCONTINUED | OUTPATIENT
Start: 2018-08-14 | End: 2018-08-17 | Stop reason: HOSPADM

## 2018-08-14 RX ORDER — ONDANSETRON 2 MG/ML
4 INJECTION INTRAMUSCULAR; INTRAVENOUS EVERY 6 HOURS PRN
Status: DISCONTINUED | OUTPATIENT
Start: 2018-08-14 | End: 2018-08-17 | Stop reason: HOSPADM

## 2018-08-14 RX ORDER — MORPHINE SULFATE 2 MG/ML
2 INJECTION, SOLUTION INTRAMUSCULAR; INTRAVENOUS ONCE
Status: COMPLETED | OUTPATIENT
Start: 2018-08-14 | End: 2018-08-14

## 2018-08-14 RX ADMIN — MORPHINE SULFATE 2 MG: 2 INJECTION, SOLUTION INTRAMUSCULAR; INTRAVENOUS at 07:36

## 2018-08-14 RX ADMIN — IOPAMIDOL 80 ML: 755 INJECTION, SOLUTION INTRAVENOUS at 07:57

## 2018-08-14 RX ADMIN — PANTOPRAZOLE SODIUM 40 MG: 40 INJECTION, POWDER, FOR SOLUTION INTRAVENOUS at 14:59

## 2018-08-14 RX ADMIN — SODIUM CHLORIDE, POTASSIUM CHLORIDE, SODIUM LACTATE AND CALCIUM CHLORIDE 1000 ML: 600; 310; 30; 20 INJECTION, SOLUTION INTRAVENOUS at 14:58

## 2018-08-14 RX ADMIN — DIATRIZOATE MEGLUMINE AND DIATRIZOATE SODIUM 120 ML: 660; 100 LIQUID ORAL; RECTAL at 16:08

## 2018-08-14 RX ADMIN — SODIUM CHLORIDE, POTASSIUM CHLORIDE, SODIUM LACTATE AND CALCIUM CHLORIDE: 600; 310; 30; 20 INJECTION, SOLUTION INTRAVENOUS at 12:38

## 2018-08-14 ASSESSMENT — ENCOUNTER SYMPTOMS
COUGH: 0
SHORTNESS OF BREATH: 0
CONSTIPATION: 0
DIARRHEA: 0
BLOOD IN STOOL: 0
VOMITING: 1
ABDOMINAL PAIN: 1
NAUSEA: 0

## 2018-08-14 ASSESSMENT — PAIN SCALES - GENERAL
PAINLEVEL_OUTOF10: 4
PAINLEVEL_OUTOF10: 6
PAINLEVEL_OUTOF10: 6
PAINLEVEL_OUTOF10: 0

## 2018-08-14 ASSESSMENT — PAIN DESCRIPTION - ORIENTATION: ORIENTATION: MID

## 2018-08-14 ASSESSMENT — PAIN DESCRIPTION - DESCRIPTORS: DESCRIPTORS: ACHING

## 2018-08-14 ASSESSMENT — PAIN DESCRIPTION - PAIN TYPE: TYPE: ACUTE PAIN

## 2018-08-14 ASSESSMENT — PAIN DESCRIPTION - LOCATION: LOCATION: ABDOMEN

## 2018-08-14 NOTE — FLOWSHEET NOTE
08/14/18 1101   Encounter Summary   Services provided to: Patient;Significant other   Referral/Consult From: Rounding   Support System Spouse   Continue Visiting (seen 8/14/18, JANES. )   Complexity of Encounter Moderate   Length of Encounter 15 minutes   Routine   Type Initial   Assessment Approachable   Intervention Active listening;Nurtured hope   Outcome Expressed gratitude

## 2018-08-14 NOTE — H&P
General Surgery   Resident History and Physical     Reason for Consult: Small Bowel Obstruction    Chief Complaint: Abdominal pain and vomiting    History obtained from: Patient and wife    History of Present Illness:    Michelle Moreno is a 80 y.o. male with Hx of open inguinal hernia repair, atrial fibrillation on Eliquis and Ticagrelor, cardiac catheterization with stent placement in 2016, HTN, and Hyperlipidemia presents with progressively worsening lower abdominal pain x3 days with 2 episodes of brown emesis this morning. Pt is unable to describe the quality of the pain but denies it being burning/sharp. Rates severity 5/10 in ED and 8-9/10 this morning prior to emesis. Patient states vomiting alleviated some of the pain and pressing down on his stomach area makes the pain worse. Last BM was yesterday per patient and wife. Denies fever, chills, diarrhea, constipation. Past Medical History:        Diagnosis Date    Atrial fibrillation ([address] 05/2016    Carotid artery disease (HCC)     R>L    GERD (gastroesophageal reflux disease) 2/21/2013    Headaches due to old head injury 2010    Heart murmur, systolic 4/9/4785    7/7/42 Echo at [facility]: Normal left ventricle size and systolic function with an estimated ejection fraction of 55%. Concentric left ventricular hypertrophy is present. No regional wall motion abnormalities are seen. There is reversal of E/A inflow velocities across the mitral valve suggesting impaired left ventricular relaxation. Aortic valve appears sclerotic but opens adequately. Moderate a    Hyperlipidemia LDL goal < 100 2/21/2013    Hypertension     Moderate aortic insufficiency 1/9/2014 1/9/14 Echo at [facility]: Normal left ventricle size and systolic function with an estimated ejection fraction of 55%. Concentric left ventricular hypertrophy is present. No regional wall motion abnormalities are seen.  There is reversal of E/A inflow velocities across the mitral valve tablet 0    lisinopril (PRINIVIL;ZESTRIL) 2.5 MG tablet Take 1 tablet by mouth daily 30 tablet 3    vitamin B-12 (CYANOCOBALAMIN) 1000 MCG tablet Take OTC B12 or similar B-complex vitamins 3nz=1686 mcg a day for neurological health and as a natural energy booster (read a bottle of 5 Hour Energy or diet Red Bull). High B12 levels are proven to help prevent dementia & neuropathy. 180 tablet 12    omeprazole (PRILOSEC OTC) 20 MG tablet 1 daily to protect stomach. 90 tablet 12    tamsulosin (FLOMAX) 0.4 MG capsule TAKE 1 capsule EVERY evening (NOT AS NEEDED) for prostate & urination. (Add & stay on finasteride for prostate also) 90 capsule 12    polyethylene glycol (GLYCOLAX) powder Take as directed every other day for constipation 1 Bottle 12       Current Meds    No current facility-administered medications for this encounter. Family History:   No family history on file. Social History:   TOBACCO:   reports that he has never smoked. He has never used smokeless tobacco.  ETOH:   reports that he drinks about 0.6 oz of alcohol per week . DRUGS:   reports that he does not use drugs. REVIEW OF SYSTEMS:     A 14 point review of systems was conducted, significant findings as noted in HPI. All other systems negative. Constitutional: Negative for chills and fever. HENT: Negative for congestion, facial swelling, and voice change. Eyes: Negative for photophobia and visual disturbance. Respiratory: Negative for apnea, cough, chest tightness and shortness of breath. Cardiovascular: Negative for chest pain and palpitations. Gastrointestinal: Negative for dysphagia and early satiety. +N/V  Genitourinary: +difficulty urinating, negative for dysuria, flank pain, frequency and hematuria. Musculoskeletal: Negative for new gait problem, joint swelling and myalgias. Skin: Negative for color change, pallor and rash.    Endocrine: negative for tremors, temperature intolerance or

## 2018-08-14 NOTE — PROGRESS NOTES
Patient arrived per stretcher from ed. Alert and oriented, no visual distress. Abdomen rounded, bowel sounds active. Vitals stable. LR started at 75 ml/hr. Reviewed plan of care and safety. Bed alarm on. Patient had large brown emesis, new orders received for NG tube, placed at 57 cm, air bolus to confirm placement. Low wall suction to NG, 250 ml brown output initially. Surgeons ordered and gave gastrografin, NG tube to be clamped x 4 hours. Patient tolerating so far. Encouraged to call with any nausea or discomfort. One liter bolus of LR ordered and given. Call light in reach. Will monitor.

## 2018-08-14 NOTE — PROGRESS NOTES
100cc gastrografin placed through NG. 3:18 PM  Clamp NG for 4 hours. Keep patient sitting up in bed during this time. Place NG back to suction if gets nauseous or increased distension and abdominal pain.     Abdominal X ray to follow contrast progression in AM.

## 2018-08-14 NOTE — ED PROVIDER NOTES
does not use drugs. Medications     Previous Medications    ACETAMINOPHEN (TYLENOL) 325 MG TABLET    Take 2 tablets by mouth every 6 hours as needed for Pain    APIXABAN (ELIQUIS) 2.5 MG TABS TABLET    Take 1 tablet by mouth 2 times daily    ATORVASTATIN (LIPITOR) 80 MG TABLET    TAKE 1 TABLET BY MOUTH ONE TIME A DAY     FINASTERIDE (PROSCAR) 5 MG TABLET    TAKE 1 TABLET BY MOUTH ONCE DAILY    FUROSEMIDE (LASIX) 40 MG TABLET    Take 1 tablet by mouth daily    LISINOPRIL (PRINIVIL;ZESTRIL) 2.5 MG TABLET    Take 1 tablet by mouth daily    OMEPRAZOLE (PRILOSEC OTC) 20 MG TABLET    1 daily to protect stomach. POLYETHYLENE GLYCOL (GLYCOLAX) POWDER    Take as directed every other day for constipation    TAMSULOSIN (FLOMAX) 0.4 MG CAPSULE    TAKE 1 capsule EVERY evening (NOT AS NEEDED) for prostate & urination. (Add & stay on finasteride for prostate also)    TICAGRELOR (BRILINTA) 90 MG TABS TABLET    Take 1 tablet by mouth 2 times daily for 60 doses    VITAMIN B-12 (CYANOCOBALAMIN) 1000 MCG TABLET    Take OTC B12 or similar B-complex vitamins 8pu=0816 mcg a day for neurological health and as a natural energy booster (read a bottle of 5 Hour Energy or diet Red Bull). High B12 levels are proven to help prevent dementia & neuropathy. Allergies     He is allergic to aspirin.     Physical Exam     INITIAL VITALS: BP: 111/66, Temp: 97.8 °F (36.6 °C), Pulse: 84, Resp: 20, SpO2: 94 %   General: A 80year-old male lying in bed in no apparent cardiorespiratory distress  HEENT:  head is atraumatic, pupils equal round and reactive to light, sclera are clear, oropharynx is nonerythematous  Neck: supple, no lymphadenopathy  Chest: clear to auscultation bilaterally with no wheezes rhonchi, rales  Cardiovascular: Regular, rate, and rhythm, normal S1S2, no murmurs, rubs, or gallops, 2+ radial pulses bilaterally, capillary refill 2 seconds  Abdominal: Diffusely tender abdomen with diffuse guarding, no rebound tenderness, not CO2 24 21 - 32 mmol/L    Anion Gap 13 3 - 16    Glucose 146 (H) 70 - 99 mg/dL    BUN 14 7 - 20 mg/dL    CREATININE 0.8 0.8 - 1.3 mg/dL    GFR Non-African American >60 >60    GFR African American >60 >60    Calcium 9.2 8.3 - 10.6 mg/dL         RECENT VITALS:  BP: 136/85, Temp: 97.8 °F (36.6 °C), Pulse: 72, Resp: 18, SpO2: 97 %       ED Course     Nursing Notes, Past Medical Hx, Past Surgical Hx, Social Hx, Allergies, and Family Hx were reviewed. The patient was given the following medications:  Orders Placed This Encounter   Medications    morphine injection 2 mg    iopamidol (ISOVUE-370) 76 % injection 80 mL       CONSULTS:  [facility] CONSULT TO PRIMARY CARE PROVIDER    MEDICAL DECISION MAKING / ASSESSMENT / Synpriyank Cheatham is a 80 y.o. male who presents emergency Department with complaints of abdominal pain. The patient had some significant tenderness on initial examination, has decreased palpable was of the past 2 days associated with vomiting was concerned for the possibility of a bowel instruction as well as given the patient's history of known cholelithiasis, cholecystitis and or viscus perforation. Given his history of peptic ulcer disease and gastric reflux. The patient had laboratory investigations sent which showed A CBC with a mild leukocytosis at 12.0, otherwise no significant anemia. Liver function test showed an elevated alkaline phosphatase and slightly elevated bilirubin at 1.2. Lipase was normal.  Basic metabolic panel showed mild elevation of glucose to 146. Otherwise, no evidence of any Y abnormalities and a normal BUN and creatinine. He had a CT of his abdomen and pelvis, which showed small bowel obstruction with a transition point in the ileum. Also had a chest x-ray which showed elevation of the right hemidiaphragm with low lung volumes but no acute cardiopulmonary abnormality. Gen. surgery was a consulted regarding the patient .   They came and

## 2018-08-14 NOTE — CARE COORDINATION
2018  [facility] Case Management Department    Patient: Drew Holley  MRN: [id number] / : 1927  ACCT: [de-identified]      Emergency Contacts  Contact 1: Name: David Kearney 1: Number: [phone]  Contact 1: Relationship: wife     Admission Documentation  Attending Provider: Yoon Adler MD  Admit date/time: 2018  6:39 AM  Status: Inpatient  Diagnosis: Small bowel obstruction ([address]     Readmission within last 30 days:  no     Living Situation  Discharge Planning  Living Arrangements: Spouse/Significant Other  Support Systems: Spouse/Significant Other  Potential Assistance Needed: Home Care  Type of Home Care Services: None  Patient expects to be discharged to[de-identified] home  Expected Discharge Date: 18    Service Assessment       Values / Beliefs  Do you have any ethnic, cultural, sacramental, or spiritual [employer] needs you would like us to be aware of while you are in the hospital?: No    Advance Directives (For Healthcare)  Pre-existing DNR Comfort Care/DNR Arrest/DNI Order: Yes, notify physician for order  Healthcare Directive: Yes, patient has an advance directive for healthcare treatment  Type of Healthcare Directive: Durable power of  for health care, Living will  Copy in Chart: No, copy requested from family      Destination  From home with spouse, Doyle Huang  [phone]. Durable Medical Equipment   Spouse states they have walker, wheelchair, cane and stair lift equipment at home. Home Health/Skilled Nursing  Home care at home? No Provider:  Provider Phone:  Pt had previously used [facility] and was very happy with the male therapist they had if needed this discharge.     Therapy Consults  PT evaluation needed?: Yes (Comment)  OT Evalulation Needed?: Yes (Comment)  SLP evaluation needed?: No    Home Medical Care  n/a   Pharmacy: [facility] on [facility] Medications:  No  Does patient want to participate in local refill/meds to beds program?: No    Goals of Care  Patient expects to be discharged to[de-identified] home  Patient plans for SNF: no  Previously stayed in [place], amenable to return if needed but spouse states strong preference to return home. Mode of transport from hospital: spouse     Barriers to discharge: No major barriers noted.  Pending medical resolution of SBO     Pelon Rose RN  [facility]  Case Management Department  Ph: [phone] [phone]

## 2018-08-14 NOTE — PROGRESS NOTES
POC    NG tube 16 Tongan is reinserted to 60 cm. It drains dark output immediately. NG tube was secured with bridle. CLWS. Will follow AM abdominal XR tomorrow.     Devang Gatica MD  PGY-1 General Surgery  08/14/18 5:23 PM  [phone]

## 2018-08-14 NOTE — H&P
(ELIQUIS) 2.5 MG TABS tablet Take 1 tablet by mouth 2 times daily 4/10/18  Yes Polly Ray MD   vitamin B-12 (CYANOCOBALAMIN) 1000 MCG tablet Take OTC B12 or similar B-complex vitamins 2so=0088 mcg a day for neurological health and as a natural energy booster (read a bottle of 5 Hour Energy or diet Red Bull). High B12 levels are proven to help prevent dementia & neuropathy. 5/20/16  Yes Angus Walsh MD   omeprazole (PRILOSEC OTC) 20 MG tablet 1 daily to protect stomach. 5/20/16  Yes Angus Walsh MD   tamsulosin (FLOMAX) 0.4 MG capsule TAKE 1 capsule EVERY evening (NOT AS NEEDED) for prostate & urination. (Add & stay on finasteride for prostate also) 5/20/16  Yes Angus Walsh MD   acetaminophen (TYLENOL) 325 MG tablet Take 2 tablets by mouth every 6 hours as needed for Pain 5/6/17   Tito Ann MD   polyethylene glycol (GLYCOLAX) powder Take as directed every other day for constipation 9/23/13   Angus Walsh MD       Allergies:  Aspirin    Social History:      The patient currently lives with his wife. TOBACCO:   reports that he has never smoked. He has never used smokeless tobacco.  ETOH:   reports that he drinks about 0.6 oz of alcohol per week . Family History:      *** Reviewed in detail and negative for DM, CAD, Cancer, CVA. Positive as follows:    No family history on file. REVIEW OF SYSTEMS:   Pertinent positives as noted in the HPI. All other systems reviewed and negative. ROS: ROS    PHYSICAL EXAM PERFORMED:    /85   Pulse 72   Temp 97.8 °F (36.6 °C)   Resp 18   SpO2 97%     General appearance:  No apparent distress, appears stated age and cooperative. HEENT:  Normal cephalic, atraumatic without obvious deformity. Pupils equal, round, and reactive to light. Extra ocular muscles intact. Conjunctivae/corneas clear. Neck: Supple, with full range of motion. Trachea midline. Respiratory:  Normal respiratory effort.  Clear to auscultation, bilaterally without showed distal small bowel obstruction.   -Gen. Surg consulted - appreciate recommendations.  -Place NG tube. -Keep NPO  -Manage pain with Tylenol. Chronic A-fib  -Hold Eliquis  -anti-coagulate with Lovenox. Edema: 3+ pitting edema. Chronic issue since Nov 2017.  -Furosemide 40mg PO Daily.       DVT Prophylaxis: Lovenox  Diet: NPO  Code Status: DNR-CC    PT/OT Eval Status: not consulted    Dispo - Floors

## 2018-08-14 NOTE — H&P
history:  Past Medical History:   Diagnosis Date    Atrial fibrillation ([facility] [address] 05/2016    Carotid artery disease (HCC)     R>L    GERD (gastroesophageal reflux disease) 2/21/2013    Headaches due to old head injury 2010    Heart murmur, systolic 6/5/3536    3/6/55 Echo at [facility] [place]: Normal left ventricle size and systolic function with an estimated ejection fraction of 55%. Concentric left ventricular hypertrophy is present. No regional wall motion abnormalities are seen. There is reversal of E/A inflow velocities across the mitral valve suggesting impaired left ventricular relaxation. Aortic valve appears sclerotic but opens adequately. Moderate a    Hyperlipidemia LDL goal < 100 2/21/2013    Hypertension     Moderate aortic insufficiency 1/9/2014 1/9/14 Echo at [facility] [place]: Normal left ventricle size and systolic function with an estimated ejection fraction of 55%. Concentric left ventricular hypertrophy is present. No regional wall motion abnormalities are seen. There is reversal of E/A inflow velocities across the mitral valve suggesting impaired left ventricular relaxation. Aortic valve appears sclerotic but opens adequately. 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 Weakness substantially exacerbated after left anterior frontal lobe intraparenchymal bleed 5/4/16 [facility] admission. Past Surgical History:   Procedure Laterality Date    CAPSULOTOMY Bilateral 11/2011    [facility] - Dr. Catie Valero, YAG capsulotomies for secondary cataracts    CATARACT REMOVAL WITH IMPLANT Left 10/6/2006    with vitrectomy & membrane peel [facility] - Dr Maxim Esposito and Sharon Turner    COLONOSCOPY  1/11/05    diverticulosis, no polyps Dr. Keysha Dior. No re-screening needed per Dr. Keysha Dior.     CORONARY ANGIOPLASTY WITH STENT patient will be staffed and discussed with Coty Diaz MD.   -----------------------------  Rao Berger DO, PGY-2  Pager - [phone]  08/14/18     Addendum to Resident H& P/Progress note:  I have personally seen,examined and evaluated the patient.  I have reviewed the current history, physical findings, labs and assessment and plan and agree with note as documented by resident MD ( Monae Moyer)      Coty Diaz MD, [address]

## 2018-08-15 ENCOUNTER — APPOINTMENT (OUTPATIENT)
Dept: GENERAL RADIOLOGY | Age: 83
DRG: 389 | End: 2018-08-15
Payer: MEDICARE

## 2018-08-15 LAB
ALBUMIN SERPL-MCNC: 2.8 G/DL (ref 3.4–5)
ALBUMIN SERPL-MCNC: 2.9 G/DL (ref 3.4–5)
ANION GAP SERPL CALCULATED.3IONS-SCNC: 10 MMOL/L (ref 3–16)
ANION GAP SERPL CALCULATED.3IONS-SCNC: 10 MMOL/L (ref 3–16)
BASOPHILS ABSOLUTE: 0 K/UL (ref 0–0.2)
BASOPHILS ABSOLUTE: 0 K/UL (ref 0–0.2)
BASOPHILS RELATIVE PERCENT: 0.2 %
BASOPHILS RELATIVE PERCENT: 0.3 %
BUN BLDV-MCNC: 19 MG/DL (ref 7–20)
BUN BLDV-MCNC: 20 MG/DL (ref 7–20)
CALCIUM SERPL-MCNC: 8.9 MG/DL (ref 8.3–10.6)
CALCIUM SERPL-MCNC: 9 MG/DL (ref 8.3–10.6)
CHLORIDE BLD-SCNC: 110 MMOL/L (ref 99–110)
CHLORIDE BLD-SCNC: 110 MMOL/L (ref 99–110)
CO2: 27 MMOL/L (ref 21–32)
CO2: 27 MMOL/L (ref 21–32)
CREAT SERPL-MCNC: 0.8 MG/DL (ref 0.8–1.3)
CREAT SERPL-MCNC: 0.8 MG/DL (ref 0.8–1.3)
EOSINOPHILS ABSOLUTE: 0 K/UL (ref 0–0.6)
EOSINOPHILS ABSOLUTE: 0 K/UL (ref 0–0.6)
EOSINOPHILS RELATIVE PERCENT: 0.1 %
EOSINOPHILS RELATIVE PERCENT: 0.1 %
GFR AFRICAN AMERICAN: >60
GFR AFRICAN AMERICAN: >60
GFR NON-AFRICAN AMERICAN: >60
GFR NON-AFRICAN AMERICAN: >60
GLUCOSE BLD-MCNC: 124 MG/DL (ref 70–99)
GLUCOSE BLD-MCNC: 131 MG/DL (ref 70–99)
HCT VFR BLD CALC: 38.2 % (ref 40.5–52.5)
HCT VFR BLD CALC: 39.3 % (ref 40.5–52.5)
HEMOGLOBIN: 12.8 G/DL (ref 13.5–17.5)
HEMOGLOBIN: 13.2 G/DL (ref 13.5–17.5)
LACTIC ACID: 1.8 MMOL/L (ref 0.4–2)
LYMPHOCYTES ABSOLUTE: 0.8 K/UL (ref 1–5.1)
LYMPHOCYTES ABSOLUTE: 0.9 K/UL (ref 1–5.1)
LYMPHOCYTES RELATIVE PERCENT: 7.9 %
LYMPHOCYTES RELATIVE PERCENT: 8.5 %
MAGNESIUM: 1.7 MG/DL (ref 1.8–2.4)
MCH RBC QN AUTO: 29.5 PG (ref 26–34)
MCH RBC QN AUTO: 29.7 PG (ref 26–34)
MCHC RBC AUTO-ENTMCNC: 33.6 G/DL (ref 31–36)
MCHC RBC AUTO-ENTMCNC: 33.6 G/DL (ref 31–36)
MCV RBC AUTO: 87.8 FL (ref 80–100)
MCV RBC AUTO: 88.4 FL (ref 80–100)
MONOCYTES ABSOLUTE: 1.1 K/UL (ref 0–1.3)
MONOCYTES ABSOLUTE: 1.2 K/UL (ref 0–1.3)
MONOCYTES RELATIVE PERCENT: 10.9 %
MONOCYTES RELATIVE PERCENT: 11.2 %
NEUTROPHILS ABSOLUTE: 8.1 K/UL (ref 1.7–7.7)
NEUTROPHILS ABSOLUTE: 8.7 K/UL (ref 1.7–7.7)
NEUTROPHILS RELATIVE PERCENT: 80 %
NEUTROPHILS RELATIVE PERCENT: 80.8 %
PDW BLD-RTO: 15.5 % (ref 12.4–15.4)
PDW BLD-RTO: 15.6 % (ref 12.4–15.4)
PHOSPHORUS: 3.7 MG/DL (ref 2.5–4.9)
PHOSPHORUS: 3.8 MG/DL (ref 2.5–4.9)
PLATELET # BLD: 174 K/UL (ref 135–450)
PLATELET # BLD: 181 K/UL (ref 135–450)
PMV BLD AUTO: 8.5 FL (ref 5–10.5)
PMV BLD AUTO: 8.8 FL (ref 5–10.5)
POTASSIUM SERPL-SCNC: 3.4 MMOL/L (ref 3.5–5.1)
POTASSIUM SERPL-SCNC: 3.4 MMOL/L (ref 3.5–5.1)
RBC # BLD: 4.35 M/UL (ref 4.2–5.9)
RBC # BLD: 4.44 M/UL (ref 4.2–5.9)
SODIUM BLD-SCNC: 147 MMOL/L (ref 136–145)
SODIUM BLD-SCNC: 147 MMOL/L (ref 136–145)
WBC # BLD: 10.1 K/UL (ref 4–11)
WBC # BLD: 10.8 K/UL (ref 4–11)

## 2018-08-15 PROCEDURE — 85025 COMPLETE CBC W/AUTO DIFF WBC: CPT

## 2018-08-15 PROCEDURE — 2580000003 HC RX 258: Performed by: INTERNAL MEDICINE

## 2018-08-15 PROCEDURE — 83605 ASSAY OF LACTIC ACID: CPT

## 2018-08-15 PROCEDURE — 94668 MNPJ CHEST WALL SBSQ: CPT

## 2018-08-15 PROCEDURE — 80069 RENAL FUNCTION PANEL: CPT

## 2018-08-15 PROCEDURE — 36415 COLL VENOUS BLD VENIPUNCTURE: CPT

## 2018-08-15 PROCEDURE — 74019 RADEX ABDOMEN 2 VIEWS: CPT

## 2018-08-15 PROCEDURE — 2580000003 HC RX 258: Performed by: STUDENT IN AN ORGANIZED HEALTH CARE EDUCATION/TRAINING PROGRAM

## 2018-08-15 PROCEDURE — 94667 MNPJ CHEST WALL 1ST: CPT

## 2018-08-15 PROCEDURE — 1200000000 HC SEMI PRIVATE

## 2018-08-15 PROCEDURE — 94761 N-INVAS EAR/PLS OXIMETRY MLT: CPT

## 2018-08-15 PROCEDURE — 94150 VITAL CAPACITY TEST: CPT

## 2018-08-15 PROCEDURE — C9113 INJ PANTOPRAZOLE SODIUM, VIA: HCPCS | Performed by: SURGERY

## 2018-08-15 PROCEDURE — 94664 DEMO&/EVAL PT USE INHALER: CPT

## 2018-08-15 PROCEDURE — 6360000002 HC RX W HCPCS: Performed by: SURGERY

## 2018-08-15 PROCEDURE — 83735 ASSAY OF MAGNESIUM: CPT

## 2018-08-15 PROCEDURE — 99232 SBSQ HOSP IP/OBS MODERATE 35: CPT | Performed by: HOSPITALIST

## 2018-08-15 PROCEDURE — 6360000002 HC RX W HCPCS: Performed by: STUDENT IN AN ORGANIZED HEALTH CARE EDUCATION/TRAINING PROGRAM

## 2018-08-15 PROCEDURE — 99231 SBSQ HOSP IP/OBS SF/LOW 25: CPT | Performed by: SURGERY

## 2018-08-15 PROCEDURE — 51798 US URINE CAPACITY MEASURE: CPT

## 2018-08-15 PROCEDURE — 94640 AIRWAY INHALATION TREATMENT: CPT

## 2018-08-15 RX ORDER — MAGNESIUM SULFATE IN WATER 40 MG/ML
2 INJECTION, SOLUTION INTRAVENOUS ONCE
Status: COMPLETED | OUTPATIENT
Start: 2018-08-15 | End: 2018-08-15

## 2018-08-15 RX ORDER — DEXTROSE AND SODIUM CHLORIDE 5; .45 G/100ML; G/100ML
INJECTION, SOLUTION INTRAVENOUS CONTINUOUS
Status: DISCONTINUED | OUTPATIENT
Start: 2018-08-15 | End: 2018-08-16

## 2018-08-15 RX ORDER — SODIUM CHLORIDE FOR INHALATION 3 %
15 VIAL, NEBULIZER (ML) INHALATION PRN
Status: DISCONTINUED | OUTPATIENT
Start: 2018-08-15 | End: 2018-08-17 | Stop reason: HOSPADM

## 2018-08-15 RX ORDER — POTASSIUM CHLORIDE 7.45 MG/ML
10 INJECTION INTRAVENOUS
Status: COMPLETED | OUTPATIENT
Start: 2018-08-15 | End: 2018-08-15

## 2018-08-15 RX ADMIN — POTASSIUM CHLORIDE 10 MEQ: 10 INJECTION, SOLUTION INTRAVENOUS at 09:55

## 2018-08-15 RX ADMIN — POTASSIUM CHLORIDE 10 MEQ: 10 INJECTION, SOLUTION INTRAVENOUS at 11:06

## 2018-08-15 RX ADMIN — PANTOPRAZOLE SODIUM 40 MG: 40 INJECTION, POWDER, FOR SOLUTION INTRAVENOUS at 07:56

## 2018-08-15 RX ADMIN — POTASSIUM CHLORIDE 10 MEQ: 10 INJECTION, SOLUTION INTRAVENOUS at 07:55

## 2018-08-15 RX ADMIN — MAGNESIUM SULFATE IN WATER 2 G: 40 INJECTION, SOLUTION INTRAVENOUS at 07:56

## 2018-08-15 RX ADMIN — POTASSIUM CHLORIDE 10 MEQ: 10 INJECTION, SOLUTION INTRAVENOUS at 12:05

## 2018-08-15 RX ADMIN — POTASSIUM CHLORIDE 10 MEQ: 10 INJECTION, SOLUTION INTRAVENOUS at 08:54

## 2018-08-15 RX ADMIN — SODIUM CHLORIDE, POTASSIUM CHLORIDE, SODIUM LACTATE AND CALCIUM CHLORIDE: 600; 310; 30; 20 INJECTION, SOLUTION INTRAVENOUS at 04:23

## 2018-08-15 RX ADMIN — DEXTROSE AND SODIUM CHLORIDE: 5; 450 INJECTION, SOLUTION INTRAVENOUS at 10:10

## 2018-08-15 RX ADMIN — Medication 10 ML: at 07:57

## 2018-08-15 ASSESSMENT — PAIN SCALES - GENERAL
PAINLEVEL_OUTOF10: 0
PAINLEVEL_OUTOF10: 0

## 2018-08-15 NOTE — PROGRESS NOTES
Progress Note  [address] Day:                                                          Code:[id number]  Admit Date: 8/14/2018                                             PCP: Ladonna Doss MD                                SUBJECTIVE:     Interval Hx: NAEO. Patient seen at bedside this AM. Pt reports flatus and BMs. Pt has no abdominal pain or chest pain. Pt denies nausea, vomiting, dizziness, or SOB. Patient has no problems at this time except nasogastric tube is uncomfortable. General surgery following. Nasogastric tube clamped. If symptoms continue to improve, NG tube to likely be removed today. MEDICATIONS:   Scheduled Meds:   potassium chloride  10 mEq Intravenous Q1H    magnesium sulfate  2 g Intravenous Once    sodium chloride flush  10 mL Intravenous 2 times per day    pneumococcal polyvalent  0.5 mL Intramuscular Once    pantoprazole  40 mg Intravenous Daily      Continuous Infusions:   lactated ringers 75 mL/hr at 08/15/18 0423     PRN Meds:sodium chloride (Inhalant), sodium chloride flush, magnesium hydroxide, ondansetron    Allergies: Allergies   Allergen Reactions    Aspirin Other (See Comments)     Ulcer       PHYSICAL EXAM:       Vitals: /76   Pulse 87   Temp 97.5 °F (36.4 °C) (Oral)   Resp 16   SpO2 94%     I/O:    Intake/Output Summary (Last 24 hours) at 08/15/18 0921  Last data filed at 08/15/18 0755   Gross per 24 hour   Intake          1321.25 ml   Output              800 ml   Net           521.25 ml     I/O this shift:  In: 10 [I.V.:10]  Out: -   I/O last 3 completed shifts:   In: 1311.3 [I.V.:1311.3]  Out: 800 [Emesis/NG output:800]      General: Alert and Oriented, No acute distress, cooperative, appears stated age  Eye: PERRLA, Normal Conjunctiva  HENT: Normocephalic  Neck: Supple, Full range of motion  Respiratory: Lungs clear to auscultation bilaterally, Non-labored respirations, no crackles, rales, ronchi, wheezes  Cardiovascular: Normal rate, regular

## 2018-08-15 NOTE — DISCHARGE SUMMARY
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 [facility]) I22.615V    Moderate aortic insufficiency I35.1    Hypertension I10    Difficulty walking R26.2    Coronary artery disease due to lipid rich plaque I25.10, I25.83    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 History of Present Illness: Bishop Tovar is a 80year-old male with a PMH of HTN, HLD, A-fib (on eliquis), CAD presenting with acute onset abdominal pain for 2-3 days with worsening pain for 24 hours prior to presenting to [address] [place] [address] had 1 episode of nonbilious and nonbloody vomiting the morning of admission. Pt denies any alleviating or aggravating factors. Pt did not have bowel movement for 2 days before presenting to hospital, but was passing gas. Pt denied chest pain, shortness of breath. Pt reports a chronic cough. Hospital Course: In the ED, pt had significant tenderness to palpation of the abdomen.  Pt was afebrile but had mild leukocytosis and LFTs showed elevated alkaline phosphatase and elevated bilirubin at 1.2. Pt had normal lipase, BUN and creatinine. CT abdomen and pelvis showed distal small bowel obstruction. CXR showed elevation of the right hemidiaphragm with associated low lung volumes, but no acute cardiopulmonary disease. General surgery was consulted. Pt was admitted to the general medicine floor for small bowel obstruction. Pt had a nasogastric tube placed which drained 800ml of dark output. The follow up abdominal xray showed persistent dilation of colon. Pt's vitals and labs were stable, but had an elevated lactate of 3.0, which then decreased to 1.8. Pt's symptoms started resolving and nasogastric tube was removed and his diet was increased to clear liquids. Pt's eliquis for his chronic atrial fibrillation was held for possible surgery. Pt was nothing per oral after admission so his lasix for chronic edema was held. Pt had decreased urine output because his flomax was held, so inputs and outputs were monitored for retention symptoms, as allan was not possible due to pt's phimosis. Pt was given adequate fluid resuscitation and felt improved. Pt denied abdominal pain, chest pain, nausea, vomiting, shortness of breath, and dizziness. Physical Exam:  /73   Pulse 60   Temp 97.3 °F (36.3 °C) (Oral)   Resp 16   Ht 5' 8\" (1.727 m) Comment: From care everywhere  SpO2 96%   BMI 22.20 kg/m²   Wt Readings from Last 3 Encounters:   06/05/18 146 lb (66.2 kg)   04/04/17 150 lb (68 kg)   12/14/16 150 lb (68 kg)     Body mass index is 22.2 kg/m². · Gen - Alert, no signs of distress, appears stated age and cooperative  · HEENT - Normal cephalic, atraumatic. ? Eye - Normal external eye, conjunctiva, lids cornea, Pupils equal, round, and reactive to light. Extra ocular muscles intact. ? Nose - Normal external nose  ? Neck - Supple. Full range of motion. · CVS - Regular rate. Regular rhythm. No mumur or rub. No edema  · Resp - Tachypnea.  No

## 2018-08-15 NOTE — PROGRESS NOTES
Internal Medicine PGY-1 Resident Progress Note        PCP: Virgilio Campos MD    Date of Admission: 8/14/2018    Subjective:     No acute events overnight. Overnight, bladder scan was done after pt did not void for shift. Showed 120 cc in bladder. Attempted to insert allan but could not 2/2 phimosis. Pt feels improved no longer complains of abdominal pain. Pt reports flatus and BMs. Denies nausea, vomiting, dizziness. Patient has no problems at this time except nasogastric tube is uncomfortable. Denies fever, chills, chest pain, SOB, HA, lightheadedness, or pain.       General surgery following. Nasogastric tube clamped. If symptoms continue to improve, NG tube to likely be removed today. Medications:  Reviewed    Infusion Medications    dextrose 5 % and 0.45 % NaCl 75 mL/hr at 08/15/18 1010     Scheduled Medications    sodium chloride flush  10 mL Intravenous 2 times per day    pneumococcal polyvalent  0.5 mL Intramuscular Once    pantoprazole  40 mg Intravenous Daily     PRN Meds: sodium chloride (Inhalant), sodium chloride flush, magnesium hydroxide, ondansetron      Intake/Output Summary (Last 24 hours) at 08/15/18 1645  Last data filed at 08/15/18 0755   Gross per 24 hour   Intake          1321.25 ml   Output              800 ml   Net           521.25 ml       Physical Exam Performed:    /76   Pulse 79   Temp 97 °F (36.1 °C) (Oral)   Resp 18   Ht 5' 8\" (1.727 m) Comment: From care everywhere  SpO2 93%   BMI 22.20 kg/m²     General appearance: No apparent distress, appears stated age and cooperative. HEENT: NJ tube in place, clamped. Respiratory:  Normal respiratory effort. Clear to auscultation, bilaterally without rales/wheezes/rhonchi. Cardiovascular: Regular rate and rhythm with normal S1/S2 without murmurs, rubs or gallops. Abdomen: Soft, non-tender, non-distended with normal bowel sounds. Musculoskeletal: 3+ pitting edema bilaterally up to knees. No clubbing, cyanosis.

## 2018-08-15 NOTE — PROGRESS NOTES
General Surgery  POC    NG tube clamped from 6:30-8:30am, returned to suction. No immediate output. Flushed with 30 cc, with 20 cc of clear fluid returned. Rounds with Dr. Brain Flores, NG tube returned to clamp.     Jamee Coronel MD   Gen Surg PGY 1  8/15/2018  10:15 AM  [phone]

## 2018-08-15 NOTE — PLAN OF CARE
Problem: Falls - Risk of:  Goal: Will remain free from falls  Will remain free from falls   Outcome: Ongoing  No attempts to get oob, call light in place, avasys and bed alarm on. Problem: Sensory:  Goal: Pain level will decrease  Pain level will decrease   Outcome: Ongoing  Denies pain, abdomen soft, having frequent bowel movements, ng draining minimal amounts.

## 2018-08-15 NOTE — PROGRESS NOTES
brilinta), HTN and HLD who presented with abdominal pain and emesis. CT scan showed distal small bowel obstruction. 3x BMs   - NPO, IVF: LR at 75 ml/hr  - NG tube clamp trial  - 100 ml gastrografin given through NG yesterday, follow up AM x-ray  - Low OUP: He was bladder scanned at 0126 showing 127 ml's. Unable to insert allan due to severe phimosis; monitor I/O's. - hold home eliquis and brilinta in case future surgery is needed   - DVT PPx: SCD's     Harjeet Vitale, MS3   08/15/18  5:35 AM  [phone]     Addendum:    Patient seen and examined with Medical Student and Surgical Team.  Agree with assessment and plan with changes made accordingly.       Vasu Kim MD  PGY-1 General Surgery  08/15/18  6:20 AM  [phone]

## 2018-08-16 LAB
ALBUMIN SERPL-MCNC: 2.5 G/DL (ref 3.4–5)
ANION GAP SERPL CALCULATED.3IONS-SCNC: 6 MMOL/L (ref 3–16)
BASOPHILS ABSOLUTE: 0 K/UL (ref 0–0.2)
BASOPHILS RELATIVE PERCENT: 0.4 %
BUN BLDV-MCNC: 19 MG/DL (ref 7–20)
CALCIUM SERPL-MCNC: 8.4 MG/DL (ref 8.3–10.6)
CHLORIDE BLD-SCNC: 106 MMOL/L (ref 99–110)
CO2: 29 MMOL/L (ref 21–32)
CREAT SERPL-MCNC: 0.7 MG/DL (ref 0.8–1.3)
EOSINOPHILS ABSOLUTE: 0.4 K/UL (ref 0–0.6)
EOSINOPHILS RELATIVE PERCENT: 4 %
GFR AFRICAN AMERICAN: >60
GFR NON-AFRICAN AMERICAN: >60
GLUCOSE BLD-MCNC: 91 MG/DL (ref 70–99)
HCT VFR BLD CALC: 36.4 % (ref 40.5–52.5)
HEMOGLOBIN: 12 G/DL (ref 13.5–17.5)
LYMPHOCYTES ABSOLUTE: 1.1 K/UL (ref 1–5.1)
LYMPHOCYTES RELATIVE PERCENT: 12.5 %
MAGNESIUM: 2.1 MG/DL (ref 1.8–2.4)
MCH RBC QN AUTO: 29.3 PG (ref 26–34)
MCHC RBC AUTO-ENTMCNC: 33 G/DL (ref 31–36)
MCV RBC AUTO: 88.9 FL (ref 80–100)
MONOCYTES ABSOLUTE: 1 K/UL (ref 0–1.3)
MONOCYTES RELATIVE PERCENT: 11.7 %
NEUTROPHILS ABSOLUTE: 6.2 K/UL (ref 1.7–7.7)
NEUTROPHILS RELATIVE PERCENT: 71.4 %
PDW BLD-RTO: 15.4 % (ref 12.4–15.4)
PHOSPHORUS: 2 MG/DL (ref 2.5–4.9)
PLATELET # BLD: 167 K/UL (ref 135–450)
PMV BLD AUTO: 8.7 FL (ref 5–10.5)
POTASSIUM SERPL-SCNC: 3.4 MMOL/L (ref 3.5–5.1)
RBC # BLD: 4.09 M/UL (ref 4.2–5.9)
SODIUM BLD-SCNC: 141 MMOL/L (ref 136–145)
WBC # BLD: 8.7 K/UL (ref 4–11)

## 2018-08-16 PROCEDURE — 6370000000 HC RX 637 (ALT 250 FOR IP): Performed by: STUDENT IN AN ORGANIZED HEALTH CARE EDUCATION/TRAINING PROGRAM

## 2018-08-16 PROCEDURE — 99232 SBSQ HOSP IP/OBS MODERATE 35: CPT | Performed by: HOSPITALIST

## 2018-08-16 PROCEDURE — 99231 SBSQ HOSP IP/OBS SF/LOW 25: CPT | Performed by: SURGERY

## 2018-08-16 PROCEDURE — 97166 OT EVAL MOD COMPLEX 45 MIN: CPT

## 2018-08-16 PROCEDURE — 97530 THERAPEUTIC ACTIVITIES: CPT

## 2018-08-16 PROCEDURE — C9113 INJ PANTOPRAZOLE SODIUM, VIA: HCPCS | Performed by: SURGERY

## 2018-08-16 PROCEDURE — 97535 SELF CARE MNGMENT TRAINING: CPT

## 2018-08-16 PROCEDURE — 94640 AIRWAY INHALATION TREATMENT: CPT

## 2018-08-16 PROCEDURE — 6370000000 HC RX 637 (ALT 250 FOR IP): Performed by: SURGERY

## 2018-08-16 PROCEDURE — 2580000003 HC RX 258: Performed by: INTERNAL MEDICINE

## 2018-08-16 PROCEDURE — 1200000000 HC SEMI PRIVATE

## 2018-08-16 PROCEDURE — 97162 PT EVAL MOD COMPLEX 30 MIN: CPT

## 2018-08-16 PROCEDURE — 83735 ASSAY OF MAGNESIUM: CPT

## 2018-08-16 PROCEDURE — 36415 COLL VENOUS BLD VENIPUNCTURE: CPT

## 2018-08-16 PROCEDURE — 2580000003 HC RX 258: Performed by: STUDENT IN AN ORGANIZED HEALTH CARE EDUCATION/TRAINING PROGRAM

## 2018-08-16 PROCEDURE — G8987 SELF CARE CURRENT STATUS: HCPCS

## 2018-08-16 PROCEDURE — 6360000002 HC RX W HCPCS: Performed by: SURGERY

## 2018-08-16 PROCEDURE — 97116 GAIT TRAINING THERAPY: CPT

## 2018-08-16 PROCEDURE — 80069 RENAL FUNCTION PANEL: CPT

## 2018-08-16 PROCEDURE — 85025 COMPLETE CBC W/AUTO DIFF WBC: CPT

## 2018-08-16 PROCEDURE — 94664 DEMO&/EVAL PT USE INHALER: CPT

## 2018-08-16 PROCEDURE — G8979 MOBILITY GOAL STATUS: HCPCS

## 2018-08-16 PROCEDURE — 94761 N-INVAS EAR/PLS OXIMETRY MLT: CPT

## 2018-08-16 PROCEDURE — G8978 MOBILITY CURRENT STATUS: HCPCS

## 2018-08-16 PROCEDURE — G8988 SELF CARE GOAL STATUS: HCPCS

## 2018-08-16 RX ORDER — POTASSIUM CHLORIDE 1.5 G/1.77G
20 POWDER, FOR SOLUTION ORAL 3 TIMES DAILY
Status: COMPLETED | OUTPATIENT
Start: 2018-08-16 | End: 2018-08-16

## 2018-08-16 RX ADMIN — POTASSIUM CHLORIDE 20 MEQ: 1.5 POWDER, FOR SOLUTION ORAL at 21:26

## 2018-08-16 RX ADMIN — Medication 10 ML: at 10:09

## 2018-08-16 RX ADMIN — POTASSIUM CHLORIDE 20 MEQ: 1.5 POWDER, FOR SOLUTION ORAL at 15:46

## 2018-08-16 RX ADMIN — PANTOPRAZOLE SODIUM 40 MG: 40 INJECTION, POWDER, FOR SOLUTION INTRAVENOUS at 10:08

## 2018-08-16 RX ADMIN — POTASSIUM & SODIUM PHOSPHATES POWDER PACK 280-160-250 MG 500 MG: 280-160-250 PACK at 11:08

## 2018-08-16 RX ADMIN — POTASSIUM CHLORIDE 20 MEQ: 1.5 POWDER, FOR SOLUTION ORAL at 10:08

## 2018-08-16 RX ADMIN — DEXTROSE AND SODIUM CHLORIDE: 5; 450 INJECTION, SOLUTION INTRAVENOUS at 00:24

## 2018-08-16 NOTE — PROGRESS NOTES
edema, no cyanosis      ASSESSMENT/PLAN:   This is a 80 y.o. male with a history of open inguinal hernia repair, cardiac catheterization with stent (2016), atrial fibrillation (on eliquis and brilinta), HTN and HLD who presented with abdominal pain and emesis. CT scan showed distal small bowel obstruction. NG tube was removed yesterday. - Diet: advance to general diet nectar thick  - IVF: SLIV  - f/u labs and UA (straight cath)  - Encourage ambulation, OOB to the chair and walking the halls  - Aggressive pulm toilet   - DVT PPx: SCD's   - GI PPx: protonix   - If patient tolerates two meals without nausea or vomiting, per surgery standpoint, pt can be discharged. Birdie Gutierrez, MS3   08/16/18  5:35 AM   [phone]     Addendum:    Patient seen and examined with Medical Student and Surgical Team.  Agree with assessment and plan with changes made accordingly. Jadiel Patrick MD  PGY-1 General Surgery  08/16/18  6:33 AM  [phone]      STAFF    I performed a history and physical examination of the patient and discussed management with the resident. I reviewed the resident's note and agree with the documented findings and plan of care.     Kelly Norwood M.D.  8/16/18   10:32 AM

## 2018-08-16 NOTE — PROGRESS NOTES
Internal Medicine PGY-1 Resident Progress Note        PCP: Gema Modi MD    Date of Admission: 8/14/2018    Subjective: No events overnight. Tolerating diet. Denies any n/v or abdominal pain. Still has moderate abdominal distension on exam. Continuing to make bowel movements. Complains LE edema has not improved. Medications:  Reviewed    Infusion Medications     Scheduled Medications    potassium chloride  20 mEq Oral TID    sodium chloride flush  10 mL Intravenous 2 times per day    pneumococcal polyvalent  0.5 mL Intramuscular Once    pantoprazole  40 mg Intravenous Daily     PRN Meds: sodium chloride (Inhalant), sodium chloride flush, magnesium hydroxide, ondansetron      Intake/Output Summary (Last 24 hours) at 08/16/18 1836  Last data filed at 08/16/18 1358   Gross per 24 hour   Intake              240 ml   Output                0 ml   Net              240 ml       Physical Exam Performed:    /89   Pulse 69   Temp 98.2 °F (36.8 °C) (Oral)   Resp 16   Ht 5' 8\" (1.727 m) Comment: From care everywhere  SpO2 95%   BMI 22.20 kg/m²     Physical Exam   Constitutional: He is oriented to person, place, and time. No distress. HENT:   Head: Normocephalic and atraumatic. Eyes: Pupils are equal, round, and reactive to light. EOM are normal. No scleral icterus. Neck: Normal range of motion. Neck supple. Cardiovascular: Normal rate and normal heart sounds. Pulmonary/Chest: Effort normal and breath sounds normal. No respiratory distress. He has no wheezes. He has no rales. He exhibits no tenderness. Abdominal: Soft. Bowel sounds are normal. He exhibits distension. There is no tenderness. There is no guarding. Musculoskeletal: He exhibits edema. He exhibits no tenderness. Neurological: He is alert and oriented to person, place, and time. Skin: Skin is warm and dry. He is not diaphoretic. Psychiatric: He has a normal mood and affect.  Thought content normal.   Vitals reviewed. Labs:   Recent Labs      08/15/18   0343  08/15/18   0531  08/16/18   0504   WBC  10.8  10.1  8.7   HGB  13.2*  12.8*  12.0*   HCT  39.3*  38.2*  36.4*   PLT  181  174  167     Recent Labs      08/15/18   0343  08/15/18   0531  08/16/18   0504   NA  147*  147*  141   K  3.4*  3.4*  3.4*   CL  110  110  106   CO2  27  27  29   BUN  19  20  19   CREATININE  0.8  0.8  0.7*   CALCIUM  9.0  8.9  8.4   PHOS  3.7  3.8  2.0*     Recent Labs      08/14/18   0716   AST  29   ALT  11   BILIDIR  <0.2   BILITOT  1.2*   ALKPHOS  164*     No results for input(s): INR in the last 72 hours. No results for input(s): Isamar Silva in the last 72 hours. Urinalysis:      Lab Results   Component Value Date    NITRU Negative 05/05/2017    WBCUA 0-2 05/05/2017    BACTERIA 1+ 05/05/2017    RBCUA 10-20 05/05/2017    BLOODU LARGE 05/05/2017    SPECGRAV 1.025 05/05/2017    GLUCOSEU Negative 05/05/2017       Radiology:  XR ABDOMEN (2 VIEWS)   Final Result   Impression:       1. There is oral contrast within the entirety of the colon. It is unclear to me when this patient received oral contrast as it does not appear to be present on the CT from yesterday. 2. Persistent dilation of multiple loops of small bowel. CT ABDOMEN PELVIS W IV CONTRAST   Final Result      Distal small bowel obstruction. Small pleural effusions. Small bilateral nonobstructing renal calculi. Possible mild cystitis-see comments. XR CHEST STANDARD (2 VW)   Final Result      Elevation of the right hemidiaphragm with associated low lung volumes. Otherwise, no acute cardiopulmonary disease identified. Assessment/Plan:    Génesis Horton, 80 y.o. male w/ PMH of HTN, HLD, A-fib, CAD, who presents with abdominal pain and vomiting for the past 2-3 days with a distended abdomen. CT showed distal small bowel obstruction.      Active Hospital Problems    Diagnosis Date Noted    Small bowel obstruction ([address]

## 2018-08-16 NOTE — PROGRESS NOTES
Caregiver Present: Yes (wife)  Diagnosis: Small bowel obstruction   Subjective  Subjective: Pt up in chair upon entry; agreeable to OT. Pain Assessment  Patient Currently in Pain: Denies       Social/Functional History  Social/Functional History  Lives With: Spouse  Type of Home: House  Home Layout: One level  Home Access: Stairs to enter with rails  Entrance Stairs - Number of Steps: 2+1  Entrance Stairs - Rails: Both  Bathroom Shower/Tub: Tub/Shower unit  Bathroom Toilet: Handicap height  Bathroom Equipment: Grab bars in shower, Grab bars around toilet  Home Equipment: Rolling walker, Lift chair (transport chair)  ADL Assistance: Needs assistance (Does not get in shower, sponge baths, wife assists with bathing and dressing)  Homemaking Assistance: Needs assistance (wife does all of it)  Ambulation Assistance: Independent (with walker)  Transfer Assistance: Independent  Active : No (wife does)  Occupation: Retired  Additional Comments: Denies falls in last few months       Objective   Treatment included functional transfer training, ADLs, and patient education. Vision: Impaired  Vision Exceptions: Wears glasses for reading  Hearing: Within functional limits    Orientation  Overall Orientation Status: Within Functional Limits     Balance  Sitting Balance: Supervision (seated on commode)  Standing Balance  Activity: functional mobility from chair to restroom and to return, standing for LB dressing/toileting, toilet transfer, sit-stand transfer  Sit to stand: Dependent/Total (mod A x2- cues for hand placement)  Stand to sit: Maximum assistance  Comment: Pt demonstrates fairly significant posterior lean upon initial standing  Functional Mobility  Functional - Mobility Device: Rolling Walker  Activity: To/from bathroom  Assist Level:  Moderate assistance (with occasional additional min A x1)  Functional Mobility Comments: Pt requires constant verbal cueing to attempt to achieve upright posture, increase step length inside walker. Pt tends to favor RLE and almost limp on LLE, he states \"this leg always gives me fits. \"   Toilet Transfers  Toilet - Technique: Ambulating  Equipment Used: Standard bedside commode (over toilet)  Toilet Transfer: Dependent/Total (mod A x2. Verbal cues for hand placement, 2 trials needed to perform successfully)  ADL  Feeding: Independent;Setup  Grooming: Setup (seated)  LE Dressing: Dependent/Total (to don clean brief)  Toileting: Dependent/Total (upon standing, pt reports he began to have a bowel movement in brief; total assist to perform hygiene/don clean brief)     Transfers  Sit to stand: Dependent/Total (mod A x2- cues for hand placement)  Stand to sit: Maximum assistance     Cognition  Overall Cognitive Status: Exceptions  Problem Solving: Assistance required to identify errors made  Insights: Decreased awareness of deficits  Initiation: Requires cues for some  Sequencing: Requires cues for some        Sensation  Overall Sensation Status: WFL (Denies numbness and tingling)        LUE AROM (degrees)  LUE AROM : WFL  RUE AROM (degrees)  RUE AROM : WFL         Assessment   Performance deficits / Impairments: Decreased functional mobility ; Decreased ADL status; Decreased strength;Decreased balance    Assessment: Pt admitted from home with wife. Functional independence appears decreased from baseline. Pt is normally able to get self to/from restroom with walker (with elevated toilet seat), as well as on and off lift chair. Pt appeared very fatigued this session and required mod assist x2 ppl to even rise from elevated toilet seat, as well as recliner. Pt demonstrated decreased standing tolerance, as well as posterior lean in stance and cues for LE advancement when walking. Pt presents as a high fall risk and is not safe to be up ambulating on own. He would benefit from continued inpt therapy at d/c. Will follow while inpt.    Treatment Diagnosis: Decreased standing tolerance, functional mobility, ADL participation     Decision Making: Medium Complexity  Patient Education: Role of OT, d/c planning- pt verb understanding  REQUIRES OT FOLLOW UP: Yes  Activity Tolerance  Activity Tolerance: Patient limited by fatigue  Safety Devices  Safety Devices in place: Yes  Type of devices: Left in chair;Nurse notified;Call light within reach; Chair alarm in place (whiteboard updated- assist x2, RW, gait belt)         Plan   Plan  Times per week: 2-5x  Times per day: Daily  If patient discharges prior to next treatment, this note will serve as discharge summary. Will continue per POC if patient does not discharge. AM-PAC Score        AM-PAC Inpatient Daily Activity Raw Score: 16  AM-PAC Inpatient ADL T-Scale Score : 35.96  ADL Inpatient CMS 0-100% Score: 53.32  ADL Inpatient CMS G-Code Modifier : CK    Goals  Short term goals  Time Frame for Short term goals: By d/c   Short term goal 1: Toilet transfers w/ mod A   Short term goal 2: Standing balance while performing ADL/mobility task x5 mins w/ min A   Short term goal 3: Chair push ups x10 reps to increase independence w/ functional transfers  Patient Goals   Patient goals :  To return home       Therapy Time   Individual Concurrent Group Co-treatment   Time In 1137         Time Out 1217         Minutes 40         Timed Code Treatment Minutes:   25    Total Treatment Minutes:  140 [facility]

## 2018-08-16 NOTE — CARE COORDINATION
Care  Was active with [facility]  Pharmacy: [address] Medications:  No  Does patient want to participate in local refill/meds to beds program?: No    Goals of Care  Patient expects to be discharged to[de-identified] home  Patient plans for SNF:[facility]         Mode of transport from hospital: will need medical transport    Barriers to discharge: none noted    Dasia Olmos RN  [facility]  Case Management Department  Ph: [phone] Fax: [phone]

## 2018-08-16 NOTE — PROGRESS NOTES
Physical Therapy    Facility/Department: [facility] [address] [place] [address]  Initial Assessment/Treatment    NAME: Sandra Crump  : 1927  MRN: [id number]    Date of Service: 2018    Discharge Recommendations:  Patient would benefit from continued therapy after discharge   Sandra Crump scored a 13/24 on the AM-PAC short mobility form. Current research shows that an AM-PAC score of 17 or less is typically not associated with a discharge to the patient's home setting. Based on the patients AM-PAC score and their current functional mobility deficits, it is recommended that the patient have 3-5 sessions per week of Physical Therapy at d/c to increase the patients independence. PT Equipment Recommendations  Other: Defer to next level of care    Patient Diagnosis(es): The encounter diagnosis was Small bowel obstruction ([address] has a past medical history of Atrial fibrillation ([address] Carotid artery disease ([address] GERD (gastroesophageal reflux disease); Headaches due to old head injury; Heart murmur, systolic; Hyperlipidemia LDL goal < 100; Hypertension; Moderate aortic insufficiency; Multi-infarct state; PUD (peptic ulcer disease); and Right leg weakness. has a past surgical history that includes hernia repair; capsulotomy (Bilateral, 2011); skin biopsy (Right, 2012); Skin cancer excision (Right, 10/2010); vitrectomy (Right, 2007); Cataract removal with implant (Left, 10/6/2006); Colonoscopy (05); and Coronary angioplasty with stent (2016). Restrictions  Position Activity Restriction  Other position/activity restrictions: up with assist  Vision/Hearing  Vision: Impaired  Vision Exceptions: Wears glasses for reading  Hearing: Within functional limits     Subjective  General  Chart Reviewed: Yes  Patient assessed for rehabilitation services?: Yes  Additional Pertinent Hx: 80 y.o. M admitted  with abdominal pain and nausea.  CT abd/pelvis= distal small bowel obstruction, small pleural effusions. CXR (-) for acute findings. PMHx= HTN, HLD, A-fib, CAD  Family / Caregiver Present: Yes (wife)  Referring Practitioner: Euell Najjar, MD  Diagnosis: SBO  Follows Commands: Within Functional Limits  Subjective  Subjective: Patient sitting up in chair, agreeable to PT  Pain Screening  Patient Currently in Pain: Denies  Vital Signs  Patient Currently in Pain: Denies       Orientation  Orientation  Overall Orientation Status: Within Functional Limits    Social/Functional History  Social/Functional History  Lives With: Spouse  Type of Home: House  Home Layout: One level  Home Access: Stairs to enter with rails  Entrance Stairs - Number of Steps: 2+1  Entrance Stairs - Rails: Both  Bathroom Shower/Tub: Tub/Shower unit  Bathroom Toilet: Handicap height  Bathroom Equipment: Grab bars in shower, Grab bars around toilet  Home Equipment: Rolling walker, Lift chair (transport chair)  ADL Assistance: Needs assistance (Does not get in shower, sponge baths, wife assists with bathing and dressing)  Homemaking Assistance: Needs assistance (wife does all of it)  Ambulation Assistance: Independent (with walker)  Transfer Assistance: Independent  Active : No (wife does)  Occupation: Retired  Additional Comments: Denies falls in last few months  Objective  Treatment included gait training, transfer training, and patient education.    AROM RLE (degrees)  RLE General AROM: Grossly WFL as evident by patient's functional mobility  AROM LLE (degrees)  LLE General AROM: Grossly WFL as evident by patient's functional mobility  Strength RLE  Comment: Generalized deconditioning with gross LE strength WFL (at least 3+/5) as evident by patient's functional mobility  Strength LLE  Comment: Generalized deconditioning with gross LE strength WFL (at least 3+/5) as evident by patient's functional mobility     Sensation  Overall Sensation Status: WFL (Denies numbness and tingling)     Transfers  Sit to Stand: Dependent/Total (modA x

## 2018-08-16 NOTE — PROGRESS NOTES
Patient states that he had scrambled eggs and half a piece of toast for breakfast after having some beef broth.

## 2018-08-16 NOTE — PLAN OF CARE
Problem: Falls - Risk of:  Goal: Will remain free from falls  Will remain free from falls   Outcome: Ongoing  Patient up to chair with non-skid socks on, calls out appropriately, chair alarm activated. Call light within reach, bed in lowest position and wheels locked. Will continue to monitor.   Goal: Absence of physical injury  Absence of physical injury   Outcome: Ongoing

## 2018-08-17 VITALS
BODY MASS INDEX: 22.2 KG/M2 | HEIGHT: 68 IN | HEART RATE: 77 BPM | OXYGEN SATURATION: 91 % | SYSTOLIC BLOOD PRESSURE: 133 MMHG | RESPIRATION RATE: 20 BRPM | DIASTOLIC BLOOD PRESSURE: 84 MMHG | TEMPERATURE: 97 F

## 2018-08-17 LAB
ALBUMIN SERPL-MCNC: 2.8 G/DL (ref 3.4–5)
ANION GAP SERPL CALCULATED.3IONS-SCNC: 8 MMOL/L (ref 3–16)
BASOPHILS ABSOLUTE: 0.1 K/UL (ref 0–0.2)
BASOPHILS RELATIVE PERCENT: 1.3 %
BUN BLDV-MCNC: 12 MG/DL (ref 7–20)
CALCIUM SERPL-MCNC: 8.4 MG/DL (ref 8.3–10.6)
CHLORIDE BLD-SCNC: 109 MMOL/L (ref 99–110)
CO2: 26 MMOL/L (ref 21–32)
CREAT SERPL-MCNC: 0.6 MG/DL (ref 0.8–1.3)
EOSINOPHILS ABSOLUTE: 0.4 K/UL (ref 0–0.6)
EOSINOPHILS RELATIVE PERCENT: 3.9 %
GFR AFRICAN AMERICAN: >60
GFR NON-AFRICAN AMERICAN: >60
GLUCOSE BLD-MCNC: 95 MG/DL (ref 70–99)
HCT VFR BLD CALC: 39.2 % (ref 40.5–52.5)
HEMOGLOBIN: 13.1 G/DL (ref 13.5–17.5)
LYMPHOCYTES ABSOLUTE: 1.1 K/UL (ref 1–5.1)
LYMPHOCYTES RELATIVE PERCENT: 11.4 %
MAGNESIUM: 1.9 MG/DL (ref 1.8–2.4)
MCH RBC QN AUTO: 29.4 PG (ref 26–34)
MCHC RBC AUTO-ENTMCNC: 33.5 G/DL (ref 31–36)
MCV RBC AUTO: 87.8 FL (ref 80–100)
MONOCYTES ABSOLUTE: 0.9 K/UL (ref 0–1.3)
MONOCYTES RELATIVE PERCENT: 9.6 %
NEUTROPHILS ABSOLUTE: 7.3 K/UL (ref 1.7–7.7)
NEUTROPHILS RELATIVE PERCENT: 73.8 %
PDW BLD-RTO: 15.1 % (ref 12.4–15.4)
PHOSPHORUS: 2.6 MG/DL (ref 2.5–4.9)
PLATELET # BLD: 182 K/UL (ref 135–450)
PMV BLD AUTO: 8.7 FL (ref 5–10.5)
POTASSIUM SERPL-SCNC: 3.8 MMOL/L (ref 3.5–5.1)
RBC # BLD: 4.46 M/UL (ref 4.2–5.9)
SODIUM BLD-SCNC: 143 MMOL/L (ref 136–145)
WBC # BLD: 9.9 K/UL (ref 4–11)

## 2018-08-17 PROCEDURE — 2580000003 HC RX 258: Performed by: INTERNAL MEDICINE

## 2018-08-17 PROCEDURE — 85025 COMPLETE CBC W/AUTO DIFF WBC: CPT

## 2018-08-17 PROCEDURE — 97530 THERAPEUTIC ACTIVITIES: CPT

## 2018-08-17 PROCEDURE — 83735 ASSAY OF MAGNESIUM: CPT

## 2018-08-17 PROCEDURE — 94668 MNPJ CHEST WALL SBSQ: CPT

## 2018-08-17 PROCEDURE — 97116 GAIT TRAINING THERAPY: CPT

## 2018-08-17 PROCEDURE — 94761 N-INVAS EAR/PLS OXIMETRY MLT: CPT

## 2018-08-17 PROCEDURE — C9113 INJ PANTOPRAZOLE SODIUM, VIA: HCPCS | Performed by: SURGERY

## 2018-08-17 PROCEDURE — 36415 COLL VENOUS BLD VENIPUNCTURE: CPT

## 2018-08-17 PROCEDURE — 94664 DEMO&/EVAL PT USE INHALER: CPT

## 2018-08-17 PROCEDURE — 99231 SBSQ HOSP IP/OBS SF/LOW 25: CPT | Performed by: SURGERY

## 2018-08-17 PROCEDURE — 80069 RENAL FUNCTION PANEL: CPT

## 2018-08-17 PROCEDURE — 94640 AIRWAY INHALATION TREATMENT: CPT

## 2018-08-17 PROCEDURE — 99238 HOSP IP/OBS DSCHRG MGMT 30/<: CPT | Performed by: HOSPITALIST

## 2018-08-17 PROCEDURE — 97535 SELF CARE MNGMENT TRAINING: CPT

## 2018-08-17 PROCEDURE — 94150 VITAL CAPACITY TEST: CPT

## 2018-08-17 PROCEDURE — 6360000002 HC RX W HCPCS: Performed by: SURGERY

## 2018-08-17 RX ADMIN — PANTOPRAZOLE SODIUM 40 MG: 40 INJECTION, POWDER, FOR SOLUTION INTRAVENOUS at 10:01

## 2018-08-17 RX ADMIN — Medication 10 ML: at 10:02

## 2018-08-17 NOTE — PROGRESS NOTES
Physical Therapy  Facility/Department: [facility] [address] [place] [address]  Daily Treatment Note  NAME: Cristian Calderon  : 1927  MRN: [id number]    Date of Service: 2018    Discharge Recommendations:  Cristian Calderon scored a 13/24 on the AM-PAC short mobility form. Current research shows that an AM-PAC score of 17 or less is typically not associated with a discharge to the patient's home setting. Based on the patients AM-PAC score and their current functional mobility deficits, it is recommended that the patient have 3-5 sessions per week of Physical Therapy at d/c to increase the patients independence. Patient would benefit from continued therapy after discharge   PT Equipment Recommendations  Other: Defer to next level of care    Patient Diagnosis(es): The encounter diagnosis was Small bowel obstruction ([address] has a past medical history of Atrial fibrillation ([address] Carotid artery disease ([address] GERD (gastroesophageal reflux disease); Headaches due to old head injury; Heart murmur, systolic; Hyperlipidemia LDL goal < 100; Hypertension; Moderate aortic insufficiency; Multi-infarct state; PUD (peptic ulcer disease); and Right leg weakness. has a past surgical history that includes hernia repair; capsulotomy (Bilateral, 2011); skin biopsy (Right, 2012); Skin cancer excision (Right, 10/2010); vitrectomy (Right, 2007); Cataract removal with implant (Left, 10/6/2006); Colonoscopy (05); and Coronary angioplasty with stent (2016). Restrictions  Position Activity Restriction  Other position/activity restrictions: up with assist  Subjective   General  Chart Reviewed: Yes  Additional Pertinent Hx: 80 y.o. M admitted  with abdominal pain and nausea. CT abd/pelvis= distal small bowel obstruction, small pleural effusions. CXR (-) for acute findings.    PMHx= HTN, HLD, A-fib, CAD  Family / Caregiver Present: No  Referring Practitioner: Jhoan Velazco MD  Subjective  Subjective: Patient supine

## 2018-08-17 NOTE — DISCHARGE SUMMARY
Code Status:  DNR-CC     Activity: activity as tolerated    Diet: regular diet      Discharge Medications:     Current Discharge Medication List           Details   atorvastatin (LIPITOR) 40 MG tablet Take 40 mg by mouth daily      finasteride (PROSCAR) 5 MG tablet TAKE 1 TABLET BY MOUTH ONCE DAILY  Qty: 90 tablet, Refills: 3      furosemide (LASIX) 40 MG tablet Take 1 tablet by mouth daily  Qty: 90 tablet, Refills: 3      apixaban (ELIQUIS) 2.5 MG TABS tablet Take 1 tablet by mouth 2 times daily  Qty: 180 tablet, Refills: 3      vitamin B-12 (CYANOCOBALAMIN) 1000 MCG tablet Take OTC B12 or similar B-complex vitamins 3gx=7843 mcg a day for neurological health and as a natural energy booster (read a bottle of 5 Hour Energy or diet Red Bull). High B12 levels are proven to help prevent dementia & neuropathy. Qty: 180 tablet, Refills: 12      omeprazole (PRILOSEC OTC) 20 MG tablet 1 daily to protect stomach. Qty: 90 tablet, Refills: 12      tamsulosin (FLOMAX) 0.4 MG capsule TAKE 1 capsule EVERY evening (NOT AS NEEDED) for prostate & urination. (Add & stay on finasteride for prostate also)  Qty: 90 capsule, Refills: 12    Associated Diagnoses: Constipation, chronic; GERD (gastroesophageal reflux disease); Hyperlipidemia with target LDL less than 100      acetaminophen (TYLENOL) 325 MG tablet Take 2 tablets by mouth every 6 hours as needed for Pain  Qty: 120 tablet, Refills: 0      polyethylene glycol (GLYCOLAX) powder Take as directed every other day for constipation  Qty: 1 Bottle, Refills: 12    Associated Diagnoses: Constipation, chronic; Moderate back pain; Diverticulitis; Abdominal pain, other specified site; PUD (peptic ulcer disease); GERD (gastroesophageal reflux disease);  Hyperlipidemia LDL goal < 100; Dizziness - light-headed; UTI (lower urinary tract infection); IGT (impaired glucose tolerance)             Time Spent on discharge is more than 30 minutes in the examination, evaluation, counseling and

## 2018-08-17 NOTE — PROGRESS NOTES
Pt was discharged via private vehicle to [facility]. IV access removed with no complications. Nurse tried twice to contact [facility] to give report. Was unable to make contact with Nursing staff.

## 2018-08-17 NOTE — PROGRESS NOTES
effortful to complete )  Transfer Comments: Pt requires increased assist to stand from eob (pt has lift chair at home that he frequently uses)      Cognition  Overall Cognitive Status: Exceptions  Problem Solving: Assistance required to identify errors made  Insights: Decreased awareness of deficits  Initiation: Requires cues for some  Sequencing: Requires cues for some        Assessment   Performance deficits / Impairments: Decreased functional mobility ; Decreased ADL status; Decreased strength;Decreased balance  Assessment: Pt continues to require heavy assist x1-2 ppl for transfers, mobility, and ADLs. Though pt receives assist from wife for most ADLs, pt is typically able to get to and from restroom independently. Unsafe to ambulate further distances at this time due to decreased safety awareness and significant weakness. Plan is to d/c to SNF. Will continue per POC. Treatment Diagnosis: Decreased standing tolerance, functional mobility, ADL participation   Patient Education: D/c planning- pt verb understanding  REQUIRES OT FOLLOW UP: Yes  Activity Tolerance  Activity Tolerance: Patient Tolerated treatment well;Patient limited by fatigue  Safety Devices  Safety Devices in place: Yes  Type of devices: Left in chair;Nurse notified;Call light within reach; Chair alarm in place (whiteboard updated-- assist x2, RW, gait belt)          Plan   Plan  Times per week: 2-5x  Times per day: Daily  If patient discharges prior to next treatment, this note will serve as discharge summary. Will continue per POC if patient does not discharge.     AM-PAC Score        AM-PAC Inpatient Daily Activity Raw Score: 16  AM-PAC Inpatient ADL T-Scale Score : 35.96  ADL Inpatient CMS 0-100% Score: 53.32  ADL Inpatient CMS G-Code Modifier : CK    Goals  Short term goals  Time Frame for Short term goals: By d/c   Short term goal 1: Toilet transfers w/ mod A - not met  Short term goal 2: Standing balance while performing ADL/mobility task x5 mins w/ min A - not met  Short term goal 3: Chair push ups x10 reps to increase independence w/ functional transfers - not met  Patient Goals   Patient goals :  To return home       Therapy Time   Individual Concurrent Group Co-treatment   Time In [address] [place] [address]         Time Out 0948         Minutes 39         Timed Code Treatment Minutes: [address] [facility],[address] Fiona Casey

## 2018-08-17 NOTE — PLAN OF CARE
Problem: Risk for Impaired Skin Integrity  Goal: Tissue integrity - skin and mucous membranes  Structural intactness and normal physiological function of skin and  mucous membranes. Outcome: Ongoing  Pt was free of any pressure wounds. Turned Q2. Problem: Falls - Risk of:  Goal: Will remain free from falls  Will remain free from falls   Outcome: Ongoing  Pt remained free of falls. Chair and bed alarms were on with call light in reach. Pt called out appropriately.

## 2018-08-17 NOTE — PROGRESS NOTES
Surgery Daily Progress Note      CC: Abdominal pain and vomiting    SUBJECTIVE:  No acute events overnight. He does not report any pain. He continues to have BM (4x last 24 hours) and flatus. He is voiding (3x last 24 hours). He is incontinent so I/O's and UA have not been possible. Per nursing straight cath was unsuccessful. He is tolerating a general diet, nectar thick without any nausea or vomiting. He has been OOB to the chair but is not walking the hallways. ROS: A 10 point review of systems was conducted, significant findings as noted in HPI. All other systems negative. Objective     EXAM:  Vitals:    08/16/18 1548 08/16/18 2123 08/16/18 2310 08/17/18 0340   BP:  120/68 (!) 143/87 125/81   Pulse:  86 91 89   Resp:  18 18 18   Temp:  97.7 °F (36.5 °C) 98.6 °F (37 °C) 98.2 °F (36.8 °C)   TempSrc:  Oral Oral Oral   SpO2: 95% 92% 92% 97%   Height:           Labs  CBC:   Recent Labs      08/15/18   0343  08/15/18   0531  08/16/18   0504   WBC  10.8  10.1  8.7   HGB  13.2*  12.8*  12.0*   HCT  39.3*  38.2*  36.4*   PLT  181  174  167       BMP:   Recent Labs      08/15/18   0343  08/15/18   0531  08/16/18   0504   NA  147*  147*  141   K  3.4*  3.4*  3.4*   CL  110  110  106   CO2  27  27  29   BUN  19  20  19   CREATININE  0.8  0.8  0.7*   GLUCOSE  131*  124*  91     LFT's:   Recent Labs      08/14/18   0716   AST  29   ALT  11   BILITOT  1.2*   ALKPHOS  164*     Troponin: No results for input(s): TROPONINI in the last 72 hours. BNP: No results for input(s): BNP in the last 72 hours. ABGs: No results for input(s): PHART, XAK6QQR, PO2ART in the last 72 hours. INR: No results for input(s): INR in the last 72 hours. Lipids: No results for input(s): CHOL, HDL in the last 72 hours.     Invalid input(s): LDLCALCU    General appearance: alert, resting in bed  Neuro: A&Ox3, no focal deficits  Chest/Lungs: CTAB, no crackles/rales, wheezes/rhonchi   Cardiovascular: RRR, no murmurs/gallops/rubs  Abdomen: soft, no

## 2018-09-25 ENCOUNTER — OFFICE VISIT (OUTPATIENT)
Dept: INTERNAL MEDICINE CLINIC | Age: 83
End: 2018-09-25
Payer: MEDICARE

## 2018-09-25 VITALS
HEIGHT: 67 IN | SYSTOLIC BLOOD PRESSURE: 98 MMHG | BODY MASS INDEX: 21.66 KG/M2 | HEART RATE: 72 BPM | DIASTOLIC BLOOD PRESSURE: 60 MMHG | WEIGHT: 138 LBS

## 2018-09-25 DIAGNOSIS — E87.6 HYPOKALEMIA: ICD-10-CM

## 2018-09-25 DIAGNOSIS — R60.0 BILATERAL LOWER EXTREMITY EDEMA: ICD-10-CM

## 2018-09-25 DIAGNOSIS — K56.609 SBO (SMALL BOWEL OBSTRUCTION) (HCC): ICD-10-CM

## 2018-09-25 DIAGNOSIS — K56.609 SMALL BOWEL OBSTRUCTION (HCC): ICD-10-CM

## 2018-09-25 DIAGNOSIS — I10 ESSENTIAL HYPERTENSION: ICD-10-CM

## 2018-09-25 DIAGNOSIS — I48.20 CHRONIC ATRIAL FIBRILLATION (HCC): ICD-10-CM

## 2018-09-25 DIAGNOSIS — Z23 NEED FOR INFLUENZA VACCINATION: Primary | ICD-10-CM

## 2018-09-25 DIAGNOSIS — R53.81 PHYSICAL DECONDITIONING: ICD-10-CM

## 2018-09-25 DIAGNOSIS — R79.89 ELEVATED TSH: ICD-10-CM

## 2018-09-25 DIAGNOSIS — E87.0 HYPERNATREMIA: ICD-10-CM

## 2018-09-25 PROBLEM — H61.23 BILATERAL HEARING LOSS DUE TO CERUMEN IMPACTION: Status: RESOLVED | Noted: 2017-07-05 | Resolved: 2018-09-25

## 2018-09-25 PROCEDURE — 1101F PT FALLS ASSESS-DOCD LE1/YR: CPT | Performed by: INTERNAL MEDICINE

## 2018-09-25 PROCEDURE — 90662 IIV NO PRSV INCREASED AG IM: CPT | Performed by: INTERNAL MEDICINE

## 2018-09-25 PROCEDURE — 99215 OFFICE O/P EST HI 40 MIN: CPT | Performed by: INTERNAL MEDICINE

## 2018-09-25 PROCEDURE — 1036F TOBACCO NON-USER: CPT | Performed by: INTERNAL MEDICINE

## 2018-09-25 PROCEDURE — G8427 DOCREV CUR MEDS BY ELIG CLIN: HCPCS | Performed by: INTERNAL MEDICINE

## 2018-09-25 PROCEDURE — 1123F ACP DISCUSS/DSCN MKR DOCD: CPT | Performed by: INTERNAL MEDICINE

## 2018-09-25 PROCEDURE — 4040F PNEUMOC VAC/ADMIN/RCVD: CPT | Performed by: INTERNAL MEDICINE

## 2018-09-25 PROCEDURE — G8420 CALC BMI NORM PARAMETERS: HCPCS | Performed by: INTERNAL MEDICINE

## 2018-09-25 PROCEDURE — G0008 ADMIN INFLUENZA VIRUS VAC: HCPCS | Performed by: INTERNAL MEDICINE

## 2018-09-25 PROCEDURE — G8598 ASA/ANTIPLAT THER USED: HCPCS | Performed by: INTERNAL MEDICINE

## 2018-09-25 RX ORDER — FUROSEMIDE 40 MG/1
40 TABLET ORAL DAILY
Qty: 90 TABLET | Refills: 3 | Status: SHIPPED | OUTPATIENT
Start: 2018-09-25 | End: 2018-11-06 | Stop reason: SDUPTHER

## 2018-09-25 RX ORDER — POTASSIUM CHLORIDE 1500 MG/1
20 TABLET, FILM COATED, EXTENDED RELEASE ORAL DAILY
Qty: 90 TABLET | Refills: 3 | Status: SHIPPED | OUTPATIENT
Start: 2018-09-25 | End: 2019-06-05

## 2018-09-25 ASSESSMENT — ENCOUNTER SYMPTOMS
DIARRHEA: 0
NAUSEA: 0
CONSTIPATION: 0
VOMITING: 0
SHORTNESS OF BREATH: 0
ABDOMINAL PAIN: 0
ABDOMINAL DISTENTION: 0
TROUBLE SWALLOWING: 0
BLOOD IN STOOL: 0

## 2018-09-25 ASSESSMENT — PATIENT HEALTH QUESTIONNAIRE - PHQ9
SUM OF ALL RESPONSES TO PHQ9 QUESTIONS 1 & 2: 0
SUM OF ALL RESPONSES TO PHQ QUESTIONS 1-9: 0
2. FEELING DOWN, DEPRESSED OR HOPELESS: 0
SUM OF ALL RESPONSES TO PHQ QUESTIONS 1-9: 0
1. LITTLE INTEREST OR PLEASURE IN DOING THINGS: 0

## 2018-09-25 NOTE — ASSESSMENT & PLAN NOTE
Mild TSH elevation on labs from earlier in the summer. Free T4 was okay at 1.5. He was placed on thyroid medication which he does not need. He was on 50 µg. Will stop medication today. Recheck TFT at next follow-up visit in 2 months.

## 2018-09-25 NOTE — ASSESSMENT & PLAN NOTE
Patient just spent 2 weeks at a rehab facility. He is back home and feels better at home. He continues to do some physical exercises at home.

## 2018-09-25 NOTE — PROGRESS NOTES
SUBJECTIVE:    Patient ID: Ruben Chan is an 80 y.o. male. HPI: Patient here today for the f/u of chronic problems -- see Problem List and associated comments. New issues or complaints include (also see Assessment for more details):  Patient here for follow-up from hospitalization and rehab. Hospital notes reviewed. Patient had a small bowel obstruction which responded well to conservative treatment and NG tube drainage. He has had no recurrent symptoms. Denies any abdominal pain or bowel issues at this time. No heartburn or reflux. Appetite is good. He was placed on thyroid medication for mild TSH elevation. He is also on potassium now. This edema is generally well controlled. He claims his energy level is better. His wife is somewhat confused as to the change in medications since his discharge. Review of Systems   Constitutional: Negative for activity change and appetite change. HENT: Negative for trouble swallowing. Respiratory: Negative for shortness of breath. Cardiovascular: Negative for chest pain and palpitations. Gastrointestinal: Negative for abdominal distention, abdominal pain, blood in stool, constipation, diarrhea, nausea and vomiting. Genitourinary: Negative for difficulty urinating. Musculoskeletal: Positive for arthralgias. Skin: Negative for rash and wound. Neurological: Negative for tremors and light-headedness. Psychiatric/Behavioral: Negative. OBJECTIVE:    BP 98/60 (Site: Left Upper Arm, Position: Sitting, Cuff Size: Medium Adult)   Pulse 72   Ht 5' 7\" (1.702 m) Comment: in wheel chair  Wt 138 lb (62.6 kg) Comment: in wheel chair  BMI 21.61 kg/m²      Physical Exam   Constitutional: He is oriented to person, place, and time. He appears well-developed and well-nourished. Wheelchair   Eyes: No scleral icterus. Neck: No JVD present. Cardiovascular: Normal rate. An irregular rhythm present. Exam reveals no gallop.     Pulses:       Radial pulses are 2+ on the right side, and 2+ on the left side. Pulmonary/Chest: Effort normal and breath sounds normal. No stridor. No respiratory distress. He has no wheezes. Abdominal: Soft. Bowel sounds are normal. He exhibits no distension, no pulsatile midline mass and no mass. There is no tenderness. Musculoskeletal: He exhibits edema (trace to 1+ ankles). Lymphadenopathy:     He has no cervical adenopathy. Neurological: He is alert and oriented to person, place, and time. Skin: He is not diaphoretic. No pallor. Psychiatric: He has a normal mood and affect. His behavior is normal. His mood appears not anxious. His affect is not angry. He is not agitated, not actively hallucinating and not combative. Thought content is not delusional. Cognition and memory are impaired. He does not exhibit a depressed mood. ASSESSMENT:       Encounter Diagnoses   Name Primary?  Need for influenza vaccination Yes    SBO (small bowel obstruction) (HCC)     Hypokalemia     Hypernatremia     Small bowel obstruction (HCC)     Elevated TSH     Bilateral lower extremity edema     Essential hypertension     Chronic atrial fibrillation (HCC)     Physical deconditioning        Small bowel obstruction [facility])  Hospital follow-up. Hospital notes reviewed. Patient responded to conservative treatment after NG tube placed. No further symptoms. He spent approximately 2 weeks in a rehab facility. Feels better being home. Medications reviewed and straightened out. Elevated TSH  Mild TSH elevation on labs from earlier in the summer. Free T4 was okay at 1.5. He was placed on thyroid medication which he does not need. He was on 50 µg. Will stop medication today. Recheck TFT at next follow-up visit in 2 months. Bilateral lower extremity edema  Patient back on Lasix 40 mg 1 daily and doing well. Also on potassium.     Hypokalemia  Continue KCl 20 mEq daily    Hypertension  BP okay    Hypernatremia  Recheck BMP    Chronic atrial fibrillation (HCC)  Rate control and anticoagulation    Physical deconditioning  Patient just spent 2 weeks at a rehab facility. He is back home and feels better at home. He continues to do some physical exercises at home. PLAN:  See ASSESSMENT for evaluation & PLAN     Orders Placed This Encounter   Procedures    INFLUENZA, HIGH DOSE, 65 YRS +, IM, PF, PREFILL SYR, 0.5ML (FLUZONE HD)    Basic Metabolic Panel     Standing Status:   Future     Number of Occurrences:   1     Standing Expiration Date:   9/25/2019       PSH, PMH, SH and FH reviewed and noted. Recent and past labs, tests and consults also reviewed. Recent or new meds also reviewed. Lengthy and thorough review of patient's current problem and other potential comorbid issues affecting patient - discussion w/ patient and accompanying family members. Questions and concerns were addressed, as well as any potential treatment options. 45 minutes spent with patient and family/caregivers discussing diagnoses and plan; at least 50% of which was for care coordination.

## 2018-09-25 NOTE — ASSESSMENT & PLAN NOTE
Hospital follow-up. Hospital notes reviewed. Patient responded to conservative treatment after NG tube placed. No further symptoms. He spent approximately 2 weeks in a rehab facility. Feels better being home. Medications reviewed and straightened out.

## 2018-10-01 ENCOUNTER — TELEPHONE (OUTPATIENT)
Dept: INTERNAL MEDICINE CLINIC | Age: 83
End: 2018-10-01

## 2018-10-15 ENCOUNTER — TELEPHONE (OUTPATIENT)
Dept: INTERNAL MEDICINE CLINIC | Age: 83
End: 2018-10-15

## 2018-11-06 ENCOUNTER — OFFICE VISIT (OUTPATIENT)
Dept: INTERNAL MEDICINE CLINIC | Age: 83
End: 2018-11-06
Payer: MEDICARE

## 2018-11-06 VITALS
HEIGHT: 67 IN | DIASTOLIC BLOOD PRESSURE: 64 MMHG | BODY MASS INDEX: 21.66 KG/M2 | WEIGHT: 138 LBS | SYSTOLIC BLOOD PRESSURE: 122 MMHG

## 2018-11-06 DIAGNOSIS — R79.89 ELEVATED TSH: Primary | ICD-10-CM

## 2018-11-06 DIAGNOSIS — E87.6 HYPOKALEMIA: ICD-10-CM

## 2018-11-06 DIAGNOSIS — I10 ESSENTIAL HYPERTENSION: ICD-10-CM

## 2018-11-06 DIAGNOSIS — E87.0 HYPERNATREMIA: ICD-10-CM

## 2018-11-06 DIAGNOSIS — R13.14 PHARYNGOESOPHAGEAL DYSPHAGIA: ICD-10-CM

## 2018-11-06 DIAGNOSIS — E03.9 ACQUIRED HYPOTHYROIDISM: ICD-10-CM

## 2018-11-06 DIAGNOSIS — R79.89 ELEVATED TSH: ICD-10-CM

## 2018-11-06 DIAGNOSIS — R60.0 BILATERAL LOWER EXTREMITY EDEMA: ICD-10-CM

## 2018-11-06 LAB
ANION GAP SERPL CALCULATED.3IONS-SCNC: 12 MMOL/L (ref 3–16)
BUN BLDV-MCNC: 14 MG/DL (ref 7–20)
CALCIUM SERPL-MCNC: 9.4 MG/DL (ref 8.3–10.6)
CHLORIDE BLD-SCNC: 100 MMOL/L (ref 99–110)
CO2: 29 MMOL/L (ref 21–32)
CREAT SERPL-MCNC: 0.9 MG/DL (ref 0.8–1.3)
GFR AFRICAN AMERICAN: >60
GFR NON-AFRICAN AMERICAN: >60
GLUCOSE BLD-MCNC: 91 MG/DL (ref 70–99)
POTASSIUM SERPL-SCNC: 4.2 MMOL/L (ref 3.5–5.1)
SODIUM BLD-SCNC: 141 MMOL/L (ref 136–145)
T4 FREE: 1.3 NG/DL (ref 0.9–1.8)
TSH SERPL DL<=0.05 MIU/L-ACNC: 5.12 UIU/ML (ref 0.27–4.2)

## 2018-11-06 PROCEDURE — G8510 SCR DEP NEG, NO PLAN REQD: HCPCS | Performed by: INTERNAL MEDICINE

## 2018-11-06 PROCEDURE — 1101F PT FALLS ASSESS-DOCD LE1/YR: CPT | Performed by: INTERNAL MEDICINE

## 2018-11-06 PROCEDURE — G8482 FLU IMMUNIZE ORDER/ADMIN: HCPCS | Performed by: INTERNAL MEDICINE

## 2018-11-06 PROCEDURE — 1036F TOBACCO NON-USER: CPT | Performed by: INTERNAL MEDICINE

## 2018-11-06 PROCEDURE — G8420 CALC BMI NORM PARAMETERS: HCPCS | Performed by: INTERNAL MEDICINE

## 2018-11-06 PROCEDURE — G8598 ASA/ANTIPLAT THER USED: HCPCS | Performed by: INTERNAL MEDICINE

## 2018-11-06 PROCEDURE — 4040F PNEUMOC VAC/ADMIN/RCVD: CPT | Performed by: INTERNAL MEDICINE

## 2018-11-06 PROCEDURE — 1123F ACP DISCUSS/DSCN MKR DOCD: CPT | Performed by: INTERNAL MEDICINE

## 2018-11-06 PROCEDURE — 99214 OFFICE O/P EST MOD 30 MIN: CPT | Performed by: INTERNAL MEDICINE

## 2018-11-06 PROCEDURE — G8427 DOCREV CUR MEDS BY ELIG CLIN: HCPCS | Performed by: INTERNAL MEDICINE

## 2018-11-06 RX ORDER — FUROSEMIDE 40 MG/1
TABLET ORAL
Qty: 135 TABLET | Refills: 3 | COMMUNITY
Start: 2018-11-06 | End: 2018-11-06 | Stop reason: SDUPTHER

## 2018-11-06 RX ORDER — FUROSEMIDE 40 MG/1
TABLET ORAL
Qty: 135 TABLET | Refills: 3 | Status: SHIPPED | OUTPATIENT
Start: 2018-11-06 | End: 2019-11-13 | Stop reason: SDUPTHER

## 2018-11-06 ASSESSMENT — PATIENT HEALTH QUESTIONNAIRE - PHQ9
2. FEELING DOWN, DEPRESSED OR HOPELESS: 0
1. LITTLE INTEREST OR PLEASURE IN DOING THINGS: 0
SUM OF ALL RESPONSES TO PHQ9 QUESTIONS 1 & 2: 0
SUM OF ALL RESPONSES TO PHQ QUESTIONS 1-9: 0
SUM OF ALL RESPONSES TO PHQ QUESTIONS 1-9: 0

## 2018-11-06 ASSESSMENT — ENCOUNTER SYMPTOMS
SHORTNESS OF BREATH: 0
TROUBLE SWALLOWING: 1

## 2018-11-06 NOTE — PROGRESS NOTES
Elevated TSH  Patient has been off thyroid Rx for approximate 6 weeks. Recheck TFT    Bilateral lower extremity edema  Due to frequent urination especially at night we'll change his Lasix to 40 mg tablets 1-1/2 tablets every morning. His wife is already cut his evening dose down to one half tablet. He uses an adult diaper with several pads at night. Swelling has improved and his breathing is good. Hypokalemia  Recheck BMP    Hypertension  BP okay    Pharyngoesophageal dysphagia  Patient to contact Dr. Mari Pina to arrange esophageal dilatation. PLAN:See ASSESSMENT for evaluation & PLAN     Orders Placed This Encounter   Procedures    TSH without Reflex     Standing Status:   Future     Standing Expiration Date:   11/6/2019    T4, Free     Standing Status:   Future     Standing Expiration Date:   11/6/2019    Basic Metabolic Panel     Standing Status:   Future     Standing Expiration Date:   11/6/2019       PSH, PMH, SH and FH reviewed and noted. Recent and past labs, tests and consultsalso reviewed. Recent or new meds also reviewed.

## 2019-03-05 ENCOUNTER — OFFICE VISIT (OUTPATIENT)
Dept: INTERNAL MEDICINE CLINIC | Age: 84
End: 2019-03-05
Payer: MEDICARE

## 2019-03-05 VITALS
DIASTOLIC BLOOD PRESSURE: 70 MMHG | BODY MASS INDEX: 21.61 KG/M2 | HEART RATE: 64 BPM | HEIGHT: 67 IN | SYSTOLIC BLOOD PRESSURE: 108 MMHG

## 2019-03-05 DIAGNOSIS — I10 ESSENTIAL HYPERTENSION: ICD-10-CM

## 2019-03-05 DIAGNOSIS — I48.91 ATRIAL FIBRILLATION AND FLUTTER (HCC): ICD-10-CM

## 2019-03-05 DIAGNOSIS — I48.20 CHRONIC ATRIAL FIBRILLATION (HCC): ICD-10-CM

## 2019-03-05 DIAGNOSIS — R60.0 BILATERAL LOWER EXTREMITY EDEMA: ICD-10-CM

## 2019-03-05 DIAGNOSIS — R79.89 ELEVATED TSH: ICD-10-CM

## 2019-03-05 DIAGNOSIS — K08.9 DENTAL DISEASE: ICD-10-CM

## 2019-03-05 DIAGNOSIS — E78.5 HYPERLIPIDEMIA WITH TARGET LDL LESS THAN 100: Primary | ICD-10-CM

## 2019-03-05 DIAGNOSIS — I48.92 ATRIAL FIBRILLATION AND FLUTTER (HCC): ICD-10-CM

## 2019-03-05 DIAGNOSIS — E03.9 HYPOTHYROIDISM, UNSPECIFIED TYPE: ICD-10-CM

## 2019-03-05 PROBLEM — R60.9 EDEMA: Status: RESOLVED | Noted: 2017-10-04 | Resolved: 2019-03-05

## 2019-03-05 PROCEDURE — G8427 DOCREV CUR MEDS BY ELIG CLIN: HCPCS | Performed by: INTERNAL MEDICINE

## 2019-03-05 PROCEDURE — 4040F PNEUMOC VAC/ADMIN/RCVD: CPT | Performed by: INTERNAL MEDICINE

## 2019-03-05 PROCEDURE — 1123F ACP DISCUSS/DSCN MKR DOCD: CPT | Performed by: INTERNAL MEDICINE

## 2019-03-05 PROCEDURE — 1101F PT FALLS ASSESS-DOCD LE1/YR: CPT | Performed by: INTERNAL MEDICINE

## 2019-03-05 PROCEDURE — G8482 FLU IMMUNIZE ORDER/ADMIN: HCPCS | Performed by: INTERNAL MEDICINE

## 2019-03-05 PROCEDURE — G8420 CALC BMI NORM PARAMETERS: HCPCS | Performed by: INTERNAL MEDICINE

## 2019-03-05 PROCEDURE — 1036F TOBACCO NON-USER: CPT | Performed by: INTERNAL MEDICINE

## 2019-03-05 PROCEDURE — 99214 OFFICE O/P EST MOD 30 MIN: CPT | Performed by: INTERNAL MEDICINE

## 2019-03-05 PROCEDURE — G8598 ASA/ANTIPLAT THER USED: HCPCS | Performed by: INTERNAL MEDICINE

## 2019-03-05 ASSESSMENT — PATIENT HEALTH QUESTIONNAIRE - PHQ9
SUM OF ALL RESPONSES TO PHQ QUESTIONS 1-9: 0
SUM OF ALL RESPONSES TO PHQ9 QUESTIONS 1 & 2: 0
SUM OF ALL RESPONSES TO PHQ QUESTIONS 1-9: 0
2. FEELING DOWN, DEPRESSED OR HOPELESS: 0
1. LITTLE INTEREST OR PLEASURE IN DOING THINGS: 0

## 2019-03-05 ASSESSMENT — ENCOUNTER SYMPTOMS
CHEST TIGHTNESS: 0
GASTROINTESTINAL NEGATIVE: 1
SHORTNESS OF BREATH: 0

## 2019-05-01 DIAGNOSIS — E78.5 HYPERLIPIDEMIA WITH TARGET LDL LESS THAN 100: ICD-10-CM

## 2019-05-01 DIAGNOSIS — K59.09 CONSTIPATION, CHRONIC: ICD-10-CM

## 2019-05-01 DIAGNOSIS — K21.9 GERD (GASTROESOPHAGEAL REFLUX DISEASE): ICD-10-CM

## 2019-05-01 RX ORDER — TAMSULOSIN HYDROCHLORIDE 0.4 MG/1
CAPSULE ORAL
Qty: 90 CAPSULE | Refills: 12 | Status: SHIPPED | OUTPATIENT
Start: 2019-05-01 | End: 2020-06-02

## 2019-06-05 ENCOUNTER — OFFICE VISIT (OUTPATIENT)
Dept: INTERNAL MEDICINE CLINIC | Age: 84
End: 2019-06-05
Payer: MEDICARE

## 2019-06-05 VITALS — SYSTOLIC BLOOD PRESSURE: 100 MMHG | HEART RATE: 54 BPM | DIASTOLIC BLOOD PRESSURE: 60 MMHG

## 2019-06-05 DIAGNOSIS — R13.14 PHARYNGOESOPHAGEAL DYSPHAGIA: ICD-10-CM

## 2019-06-05 DIAGNOSIS — E03.9 HYPOTHYROIDISM, UNSPECIFIED TYPE: ICD-10-CM

## 2019-06-05 DIAGNOSIS — I48.91 ATRIAL FIBRILLATION AND FLUTTER (HCC): ICD-10-CM

## 2019-06-05 DIAGNOSIS — E78.5 HYPERLIPIDEMIA WITH TARGET LDL LESS THAN 100: ICD-10-CM

## 2019-06-05 DIAGNOSIS — I48.20 CHRONIC ATRIAL FIBRILLATION (HCC): ICD-10-CM

## 2019-06-05 DIAGNOSIS — R60.0 BILATERAL LOWER EXTREMITY EDEMA: ICD-10-CM

## 2019-06-05 DIAGNOSIS — I25.10 CORONARY ARTERY DISEASE DUE TO LIPID RICH PLAQUE: ICD-10-CM

## 2019-06-05 DIAGNOSIS — I25.83 CORONARY ARTERY DISEASE DUE TO LIPID RICH PLAQUE: ICD-10-CM

## 2019-06-05 DIAGNOSIS — E87.6 HYPOKALEMIA: ICD-10-CM

## 2019-06-05 DIAGNOSIS — E87.0 HYPERNATREMIA: ICD-10-CM

## 2019-06-05 DIAGNOSIS — I10 ESSENTIAL HYPERTENSION: Primary | ICD-10-CM

## 2019-06-05 DIAGNOSIS — I48.92 ATRIAL FIBRILLATION AND FLUTTER (HCC): ICD-10-CM

## 2019-06-05 DIAGNOSIS — R74.8 ELEVATED ALKALINE PHOSPHATASE LEVEL: ICD-10-CM

## 2019-06-05 DIAGNOSIS — I10 ESSENTIAL HYPERTENSION: ICD-10-CM

## 2019-06-05 DIAGNOSIS — R79.89 ELEVATED TSH: ICD-10-CM

## 2019-06-05 LAB
A/G RATIO: 1.6 (ref 1.1–2.2)
ALBUMIN SERPL-MCNC: 3.6 G/DL (ref 3.4–5)
ALP BLD-CCNC: 138 U/L (ref 40–129)
ALT SERPL-CCNC: 14 U/L (ref 10–40)
ANION GAP SERPL CALCULATED.3IONS-SCNC: 8 MMOL/L (ref 3–16)
AST SERPL-CCNC: 21 U/L (ref 15–37)
BASOPHILS ABSOLUTE: 0.1 K/UL (ref 0–0.2)
BASOPHILS RELATIVE PERCENT: 1.3 %
BILIRUB SERPL-MCNC: 0.8 MG/DL (ref 0–1)
BUN BLDV-MCNC: 11 MG/DL (ref 7–20)
CALCIUM SERPL-MCNC: 9 MG/DL (ref 8.3–10.6)
CHLORIDE BLD-SCNC: 102 MMOL/L (ref 99–110)
CHOLESTEROL, TOTAL: 92 MG/DL (ref 0–199)
CO2: 29 MMOL/L (ref 21–32)
CREAT SERPL-MCNC: 0.8 MG/DL (ref 0.8–1.3)
EOSINOPHILS ABSOLUTE: 0.3 K/UL (ref 0–0.6)
EOSINOPHILS RELATIVE PERCENT: 5 %
GFR AFRICAN AMERICAN: >60
GFR NON-AFRICAN AMERICAN: >60
GLOBULIN: 2.2 G/DL
GLUCOSE BLD-MCNC: 89 MG/DL (ref 70–99)
HCT VFR BLD CALC: 40.3 % (ref 40.5–52.5)
HDLC SERPL-MCNC: 47 MG/DL (ref 40–60)
HEMOGLOBIN: 13.4 G/DL (ref 13.5–17.5)
LDL CHOLESTEROL CALCULATED: 34 MG/DL
LYMPHOCYTES ABSOLUTE: 0.9 K/UL (ref 1–5.1)
LYMPHOCYTES RELATIVE PERCENT: 14.1 %
MCH RBC QN AUTO: 29.7 PG (ref 26–34)
MCHC RBC AUTO-ENTMCNC: 33.3 G/DL (ref 31–36)
MCV RBC AUTO: 89.2 FL (ref 80–100)
MONOCYTES ABSOLUTE: 0.6 K/UL (ref 0–1.3)
MONOCYTES RELATIVE PERCENT: 9.3 %
NEUTROPHILS ABSOLUTE: 4.5 K/UL (ref 1.7–7.7)
NEUTROPHILS RELATIVE PERCENT: 70.3 %
PDW BLD-RTO: 16.6 % (ref 12.4–15.4)
PLATELET # BLD: 177 K/UL (ref 135–450)
PMV BLD AUTO: 9 FL (ref 5–10.5)
POTASSIUM SERPL-SCNC: 4.3 MMOL/L (ref 3.5–5.1)
RBC # BLD: 4.52 M/UL (ref 4.2–5.9)
SODIUM BLD-SCNC: 139 MMOL/L (ref 136–145)
T4 FREE: 1.3 NG/DL (ref 0.9–1.8)
TOTAL PROTEIN: 5.8 G/DL (ref 6.4–8.2)
TRIGL SERPL-MCNC: 53 MG/DL (ref 0–150)
TSH SERPL DL<=0.05 MIU/L-ACNC: 5.36 UIU/ML (ref 0.27–4.2)
VLDLC SERPL CALC-MCNC: 11 MG/DL
WBC # BLD: 6.5 K/UL (ref 4–11)

## 2019-06-05 PROCEDURE — G8427 DOCREV CUR MEDS BY ELIG CLIN: HCPCS | Performed by: INTERNAL MEDICINE

## 2019-06-05 PROCEDURE — 1123F ACP DISCUSS/DSCN MKR DOCD: CPT | Performed by: INTERNAL MEDICINE

## 2019-06-05 PROCEDURE — 4040F PNEUMOC VAC/ADMIN/RCVD: CPT | Performed by: INTERNAL MEDICINE

## 2019-06-05 PROCEDURE — G8598 ASA/ANTIPLAT THER USED: HCPCS | Performed by: INTERNAL MEDICINE

## 2019-06-05 PROCEDURE — 99214 OFFICE O/P EST MOD 30 MIN: CPT | Performed by: INTERNAL MEDICINE

## 2019-06-05 PROCEDURE — G8420 CALC BMI NORM PARAMETERS: HCPCS | Performed by: INTERNAL MEDICINE

## 2019-06-05 PROCEDURE — 1036F TOBACCO NON-USER: CPT | Performed by: INTERNAL MEDICINE

## 2019-06-05 RX ORDER — POTASSIUM CHLORIDE 750 MG/1
20 TABLET, EXTENDED RELEASE ORAL DAILY
Qty: 180 TABLET | Refills: 3 | Status: SHIPPED | OUTPATIENT
Start: 2019-06-05 | End: 2019-10-14 | Stop reason: SDUPTHER

## 2019-06-05 ASSESSMENT — ENCOUNTER SYMPTOMS
SHORTNESS OF BREATH: 0
TROUBLE SWALLOWING: 1

## 2019-06-05 NOTE — PROGRESS NOTES
SUBJECTIVE:  Patient ID: Steve Rich is an 80 y.o. male. HPI: Patient here today for the f/u of chronic problems-- see Problem List and associated comments. New issues or complaints include (alsosee Assessment for more details):  Here for checkup. Overall he is doing well. His edema is stable. He is having some difficulty swallowing large tablets. He is not having any difficulty with food or liquids. No chest pain or breathing issues. He had an abrasion on his left arm which was treated conservatively by his wife and is since healed. Review of Systems   Constitutional: Negative for activity change and appetite change. HENT: Positive for trouble swallowing. Respiratory: Negative for shortness of breath. Cardiovascular: Positive for leg swelling. Negative for chest pain and palpitations. Genitourinary: Negative. Musculoskeletal: Positive for arthralgias. Neurological: Negative for headaches. Psychiatric/Behavioral: Negative. OBJECTIVE:    /60 (Site: Left Upper Arm, Position: Sitting, Cuff Size: Medium Adult)   Pulse 54      Physical Exam   Constitutional: He is oriented to person, place, and time. He appears well-developed and well-nourished. Wheelchair   Eyes: No scleral icterus. Neck: No JVD present. Carotid bruit is not present. Cardiovascular: Normal rate. An irregular rhythm present. Exam reveals no gallop. Pulses:       Carotid pulses are 2+ on the right side, and 2+ on the left side. Radial pulses are 2+ on the right side, and 2+ on the left side. Pulmonary/Chest: Effort normal and breath sounds normal. No stridor. No respiratory distress. He has no wheezes. He has no rales. Abdominal: Soft. Bowel sounds are normal.   Musculoskeletal: He exhibits edema (ankles and bilateral lower extremities below knees). Neurological: He is alert and oriented to person, place, and time. Skin: He is not diaphoretic. No pallor.    Healed abrasion site left upper arm   Psychiatric: He has a normal mood and affect. His behavior is normal. Judgment and thought content normal.       ASSESSMENT:       Encounter Diagnoses   Name Primary?  Essential hypertension Yes    Hyperlipidemia with target LDL less than 100     Elevated TSH     Atrial fibrillation and flutter (HCC)     Hypothyroidism, unspecified type     Hypokalemia     Hypernatremia     Elevated alkaline phosphatase level     Coronary artery disease due to lipid rich plaque     Pharyngoesophageal dysphagia     Bilateral lower extremity edema     Chronic atrial fibrillation (HCC)        Hypertension  BP ok - no rx    Hypokalemia  Change potassium to 10 mEq at 2 per day. The 20 mEq tablet was too big to swallow. Hyperlipidemia with target LDL less than 100  Labs today on Lipitor    Coronary artery disease due to lipid rich plaque  No chest pain    Pharyngoesophageal dysphagia  Stable. Food okay. Just some difficulty with large tablets. He is to return to GI for dilatation as needed. Bilateral lower extremity edema  Stable    Chronic atrial fibrillation (HCC)  Continue Eliquis        PLAN:See ASSESSMENT for evaluation & PLAN     Orders Placed This Encounter   Procedures    CBC Auto Differential     Standing Status:   Future     Standing Expiration Date:   6/4/2020    Comprehensive Metabolic Panel     Standing Status:   Future     Standing Expiration Date:   6/4/2020    Lipid Panel     Standing Status:   Future     Standing Expiration Date:   6/4/2020     Order Specific Question:   Is Patient Fasting?/# of Hours     Answer:   yes - 8 hours    TSH without Reflex     Standing Status:   Future     Standing Expiration Date:   6/4/2020    T4, Free     Standing Status:   Future     Standing Expiration Date:   6/4/2020    ALKALINE PHOSPHATASE, ISOENZYMES     Standing Status:   Future     Standing Expiration Date:   6/5/2020       PSH, PMH, SH and FH reviewed and noted.   Recent and past labs, tests and consultsalso reviewed. Recent or new meds also reviewed.

## 2019-06-05 NOTE — ASSESSMENT & PLAN NOTE
Stable. Food okay. Just some difficulty with large tablets. He is to return to GI for dilatation as needed.

## 2019-06-07 LAB
ALK PHOS OTHER CALC: 0 U/L
ALK PHOSPHATASE: 133 U/L (ref 40–120)
ALKALINE PHOSPHATASE BONE FRACTION: 29 U/L (ref 0–55)
ALKALINE PHOSPHATASE LIVER FRACTION: 104 U/L (ref 0–94)

## 2019-09-05 ENCOUNTER — OFFICE VISIT (OUTPATIENT)
Dept: INTERNAL MEDICINE CLINIC | Age: 84
End: 2019-09-05
Payer: MEDICARE

## 2019-09-05 VITALS — OXYGEN SATURATION: 91 % | HEART RATE: 61 BPM | DIASTOLIC BLOOD PRESSURE: 60 MMHG | SYSTOLIC BLOOD PRESSURE: 100 MMHG

## 2019-09-05 DIAGNOSIS — I25.83 CORONARY ARTERY DISEASE DUE TO LIPID RICH PLAQUE: ICD-10-CM

## 2019-09-05 DIAGNOSIS — I10 ESSENTIAL HYPERTENSION: ICD-10-CM

## 2019-09-05 DIAGNOSIS — K08.9 DENTAL DISEASE: ICD-10-CM

## 2019-09-05 DIAGNOSIS — I48.20 CHRONIC ATRIAL FIBRILLATION (HCC): ICD-10-CM

## 2019-09-05 DIAGNOSIS — R60.0 BILATERAL LOWER EXTREMITY EDEMA: ICD-10-CM

## 2019-09-05 DIAGNOSIS — I25.10 CORONARY ARTERY DISEASE DUE TO LIPID RICH PLAQUE: ICD-10-CM

## 2019-09-05 PROCEDURE — 1123F ACP DISCUSS/DSCN MKR DOCD: CPT | Performed by: INTERNAL MEDICINE

## 2019-09-05 PROCEDURE — 4040F PNEUMOC VAC/ADMIN/RCVD: CPT | Performed by: INTERNAL MEDICINE

## 2019-09-05 PROCEDURE — G8427 DOCREV CUR MEDS BY ELIG CLIN: HCPCS | Performed by: INTERNAL MEDICINE

## 2019-09-05 PROCEDURE — 99214 OFFICE O/P EST MOD 30 MIN: CPT | Performed by: INTERNAL MEDICINE

## 2019-09-05 PROCEDURE — 1036F TOBACCO NON-USER: CPT | Performed by: INTERNAL MEDICINE

## 2019-09-05 PROCEDURE — G8598 ASA/ANTIPLAT THER USED: HCPCS | Performed by: INTERNAL MEDICINE

## 2019-09-05 PROCEDURE — G8420 CALC BMI NORM PARAMETERS: HCPCS | Performed by: INTERNAL MEDICINE

## 2019-09-05 PROCEDURE — 3288F FALL RISK ASSESSMENT DOCD: CPT | Performed by: INTERNAL MEDICINE

## 2019-09-05 ASSESSMENT — ENCOUNTER SYMPTOMS
SHORTNESS OF BREATH: 0
GASTROINTESTINAL NEGATIVE: 1
TROUBLE SWALLOWING: 1

## 2019-09-05 ASSESSMENT — PATIENT HEALTH QUESTIONNAIRE - PHQ9
SUM OF ALL RESPONSES TO PHQ QUESTIONS 1-9: 0
1. LITTLE INTEREST OR PLEASURE IN DOING THINGS: 0
DEPRESSION UNABLE TO ASSESS: PT REFUSES
SUM OF ALL RESPONSES TO PHQ QUESTIONS 1-9: 0

## 2019-09-05 NOTE — PROGRESS NOTES
SUBJECTIVE:  Patient ID: Arjun Melissa is an 80 y.o. male. HPI: Patient here today for the f/u of chronic problems-- see Problem List and associated comments. New issues or complaints include (alsosee Assessment for more details): Here for follow-up on his blood pressure. He denies any lightheadedness or dizziness. However he is not very active. He spends most of his day in his wheelchair. There is no chest pain or unusual shortness of breath. No PND orthopnea. He relies extensively on his wife to assist him in and out of bed and with ADLs. She was recently hospitalized for a day and he remained in bed but at least has a caretaker of a neighbor. He has significant dental disease but he is having no pain or difficulty eating. He has been given the option by his dentist of no intervention. Review of Systems   Constitutional: Negative for activity change. HENT: Positive for dental problem and trouble swallowing (Only large pills). Respiratory: Negative for shortness of breath. Cardiovascular: Negative for chest pain and palpitations. Gastrointestinal: Negative. Genitourinary: Negative for difficulty urinating. Neurological: Negative for dizziness and light-headedness. Psychiatric/Behavioral: Negative for behavioral problems and confusion. OBJECTIVE:    /60 (Site: Left Upper Arm, Position: Sitting, Cuff Size: Medium Adult)   Pulse 61   SpO2 91%      Physical Exam   Constitutional: He is oriented to person, place, and time. He appears well-developed and well-nourished. Wheelchair   HENT:   Mouth/Throat: Abnormal dentition. No dental abscesses. Eyes: No scleral icterus. Neck: No JVD present. Carotid bruit is not present. Cardiovascular: Normal rate. An irregular rhythm present. Exam reveals no gallop. Pulses:       Carotid pulses are 2+ on the right side, and 2+ on the left side. Radial pulses are 2+ on the right side, and 2+ on the left side.

## 2019-09-24 RX ORDER — FINASTERIDE 5 MG/1
TABLET, FILM COATED ORAL
Qty: 90 TABLET | Refills: 3 | Status: SHIPPED | OUTPATIENT
Start: 2019-09-24 | End: 2020-09-23 | Stop reason: SDUPTHER

## 2019-10-14 ENCOUNTER — NURSE ONLY (OUTPATIENT)
Dept: INTERNAL MEDICINE CLINIC | Age: 84
End: 2019-10-14
Payer: MEDICARE

## 2019-10-14 DIAGNOSIS — Z23 NEED FOR INFLUENZA VACCINATION: Primary | ICD-10-CM

## 2019-10-14 PROCEDURE — 90653 IIV ADJUVANT VACCINE IM: CPT | Performed by: INTERNAL MEDICINE

## 2019-10-14 PROCEDURE — G0008 ADMIN INFLUENZA VIRUS VAC: HCPCS | Performed by: INTERNAL MEDICINE

## 2019-10-14 RX ORDER — POTASSIUM CHLORIDE 750 MG/1
20 TABLET, EXTENDED RELEASE ORAL DAILY
Qty: 180 TABLET | Refills: 3 | Status: SHIPPED | OUTPATIENT
Start: 2019-10-14

## 2019-11-13 ENCOUNTER — TELEPHONE (OUTPATIENT)
Dept: INTERNAL MEDICINE CLINIC | Age: 84
End: 2019-11-13

## 2019-11-13 RX ORDER — FUROSEMIDE 40 MG/1
TABLET ORAL
Qty: 135 TABLET | Refills: 3 | Status: SHIPPED | OUTPATIENT
Start: 2019-11-13 | End: 2021-02-19

## 2020-01-06 ENCOUNTER — APPOINTMENT (OUTPATIENT)
Dept: CT IMAGING | Age: 85
DRG: 871 | End: 2020-01-06
Payer: MEDICARE

## 2020-01-06 ENCOUNTER — HOSPITAL ENCOUNTER (INPATIENT)
Age: 85
LOS: 3 days | Discharge: NURSING, REHAB OR LONG-TERM CARE | DRG: 871 | End: 2020-01-09
Attending: EMERGENCY MEDICINE | Admitting: HOSPITALIST
Payer: MEDICARE

## 2020-01-06 ENCOUNTER — ANESTHESIA (OUTPATIENT)
Dept: ENDOSCOPY | Age: 85
DRG: 871 | End: 2020-01-06
Payer: MEDICARE

## 2020-01-06 ENCOUNTER — APPOINTMENT (OUTPATIENT)
Dept: GENERAL RADIOLOGY | Age: 85
DRG: 871 | End: 2020-01-06
Payer: MEDICARE

## 2020-01-06 ENCOUNTER — ANESTHESIA EVENT (OUTPATIENT)
Dept: ENDOSCOPY | Age: 85
DRG: 871 | End: 2020-01-06
Payer: MEDICARE

## 2020-01-06 VITALS — DIASTOLIC BLOOD PRESSURE: 45 MMHG | SYSTOLIC BLOOD PRESSURE: 83 MMHG | OXYGEN SATURATION: 75 %

## 2020-01-06 PROBLEM — K92.0 COFFEE GROUND EMESIS: Status: ACTIVE | Noted: 2020-01-06

## 2020-01-06 PROBLEM — K92.2 GI BLEED: Status: ACTIVE | Noted: 2020-01-06

## 2020-01-06 LAB
ABO/RH: NORMAL
ALBUMIN SERPL-MCNC: 3.3 G/DL (ref 3.4–5)
ALP BLD-CCNC: 162 U/L (ref 40–129)
ALT SERPL-CCNC: 9 U/L (ref 10–40)
ANION GAP SERPL CALCULATED.3IONS-SCNC: 14 MMOL/L (ref 3–16)
ANION GAP SERPL CALCULATED.3IONS-SCNC: 15 MMOL/L (ref 3–16)
ANTIBODY SCREEN: NORMAL
AST SERPL-CCNC: 19 U/L (ref 15–37)
BACTERIA: ABNORMAL /HPF
BASE EXCESS ARTERIAL: 0.5 MMOL/L (ref -3–3)
BASOPHILS ABSOLUTE: 0 K/UL (ref 0–0.2)
BASOPHILS ABSOLUTE: 0 K/UL (ref 0–0.2)
BASOPHILS RELATIVE PERCENT: 0.2 %
BASOPHILS RELATIVE PERCENT: 0.2 %
BILIRUB SERPL-MCNC: 1.3 MG/DL (ref 0–1)
BILIRUBIN DIRECT: 0.5 MG/DL (ref 0–0.3)
BILIRUBIN URINE: NEGATIVE
BILIRUBIN, INDIRECT: 0.8 MG/DL (ref 0–1)
BLOOD, URINE: ABNORMAL
BUN BLDV-MCNC: 15 MG/DL (ref 7–20)
BUN BLDV-MCNC: 18 MG/DL (ref 7–20)
CALCIUM SERPL-MCNC: 8.5 MG/DL (ref 8.3–10.6)
CALCIUM SERPL-MCNC: 8.7 MG/DL (ref 8.3–10.6)
CARBOXYHEMOGLOBIN ARTERIAL: 2.4 % (ref 0–1.5)
CASTS: ABNORMAL /LPF
CHLORIDE BLD-SCNC: 102 MMOL/L (ref 99–110)
CHLORIDE BLD-SCNC: 105 MMOL/L (ref 99–110)
CHP ED QC CHECK: YES
CLARITY: CLEAR
CO2: 23 MMOL/L (ref 21–32)
CO2: 23 MMOL/L (ref 21–32)
COLOR: YELLOW
CREAT SERPL-MCNC: 1 MG/DL (ref 0.8–1.3)
CREAT SERPL-MCNC: 1.1 MG/DL (ref 0.8–1.3)
EOSINOPHILS ABSOLUTE: 0 K/UL (ref 0–0.6)
EOSINOPHILS ABSOLUTE: 0.3 K/UL (ref 0–0.6)
EOSINOPHILS RELATIVE PERCENT: 0.2 %
EOSINOPHILS RELATIVE PERCENT: 2.1 %
EPITHELIAL CELLS, UA: ABNORMAL /HPF
GFR AFRICAN AMERICAN: >60
GFR AFRICAN AMERICAN: >60
GFR NON-AFRICAN AMERICAN: >60
GFR NON-AFRICAN AMERICAN: >60
GLUCOSE BLD-MCNC: 123 MG/DL (ref 70–99)
GLUCOSE BLD-MCNC: 84 MG/DL (ref 70–99)
GLUCOSE BLD-MCNC: 92 MG/DL (ref 70–99)
GLUCOSE URINE: NEGATIVE MG/DL
HCO3 ARTERIAL: 23 MMOL/L (ref 21–29)
HCT VFR BLD CALC: 38 % (ref 40.5–52.5)
HCT VFR BLD CALC: 39.3 % (ref 40.5–52.5)
HCT VFR BLD CALC: 41.7 % (ref 40.5–52.5)
HEMOCCULT STL QL: NEGATIVE
HEMOGLOBIN, ART, EXTENDED: 11.8 G/DL
HEMOGLOBIN: 12.4 G/DL (ref 13.5–17.5)
HEMOGLOBIN: 12.9 G/DL (ref 13.5–17.5)
HEMOGLOBIN: 13.5 G/DL (ref 13.5–17.5)
KETONES, URINE: ABNORMAL MG/DL
LACTIC ACID: 3.7 MMOL/L (ref 0.4–2)
LEUKOCYTE ESTERASE, URINE: ABNORMAL
LIPASE: 9 U/L (ref 13–60)
LYMPHOCYTES ABSOLUTE: 0.3 K/UL (ref 1–5.1)
LYMPHOCYTES ABSOLUTE: 0.6 K/UL (ref 1–5.1)
LYMPHOCYTES RELATIVE PERCENT: 3.3 %
LYMPHOCYTES RELATIVE PERCENT: 4.8 %
MAGNESIUM: 1.8 MG/DL (ref 1.8–2.4)
MCH RBC QN AUTO: 29.9 PG (ref 26–34)
MCH RBC QN AUTO: 29.9 PG (ref 26–34)
MCHC RBC AUTO-ENTMCNC: 32.3 G/DL (ref 31–36)
MCHC RBC AUTO-ENTMCNC: 32.9 G/DL (ref 31–36)
MCV RBC AUTO: 90.9 FL (ref 80–100)
MCV RBC AUTO: 92.5 FL (ref 80–100)
METHEMOGLOBIN ARTERIAL: 0.4 % (ref 0–1.4)
MICROSCOPIC EXAMINATION: YES
MONOCYTES ABSOLUTE: 0.6 K/UL (ref 0–1.3)
MONOCYTES ABSOLUTE: 1 K/UL (ref 0–1.3)
MONOCYTES RELATIVE PERCENT: 7.6 %
MONOCYTES RELATIVE PERCENT: 7.8 %
NEUTROPHILS ABSOLUTE: 10.9 K/UL (ref 1.7–7.7)
NEUTROPHILS ABSOLUTE: 6.9 K/UL (ref 1.7–7.7)
NEUTROPHILS RELATIVE PERCENT: 85.1 %
NEUTROPHILS RELATIVE PERCENT: 88.7 %
NITRITE, URINE: NEGATIVE
O2 SAT, ARTERIAL: 96 % (ref 93–100)
PCO2 ARTERIAL: 33.5 MMHG (ref 35–45)
PDW BLD-RTO: 16.3 % (ref 12.4–15.4)
PDW BLD-RTO: 16.3 % (ref 12.4–15.4)
PERFORMED ON: NORMAL
PH ARTERIAL: 7.46 (ref 7.35–7.45)
PH UA: 5.5 (ref 5–8)
PHOSPHORUS: 3.5 MG/DL (ref 2.5–4.9)
PLATELET # BLD: 178 K/UL (ref 135–450)
PLATELET # BLD: 209 K/UL (ref 135–450)
PMV BLD AUTO: 8.8 FL (ref 5–10.5)
PMV BLD AUTO: 9 FL (ref 5–10.5)
PO2 ARTERIAL: 76.5 MMHG (ref 75–108)
POTASSIUM REFLEX MAGNESIUM: 3.9 MMOL/L (ref 3.5–5.1)
POTASSIUM SERPL-SCNC: 4 MMOL/L (ref 3.5–5.1)
PROCALCITONIN: 0.47 NG/ML (ref 0–0.15)
PROTEIN UA: NEGATIVE MG/DL
RBC # BLD: 4.32 M/UL (ref 4.2–5.9)
RBC # BLD: 4.51 M/UL (ref 4.2–5.9)
RBC UA: ABNORMAL /HPF (ref 0–2)
SODIUM BLD-SCNC: 140 MMOL/L (ref 136–145)
SODIUM BLD-SCNC: 142 MMOL/L (ref 136–145)
SPECIFIC GRAVITY UA: 1.02 (ref 1–1.03)
TCO2 ARTERIAL: 24 MMOL/L
TOTAL PROTEIN: 6 G/DL (ref 6.4–8.2)
URINE REFLEX TO CULTURE: YES
URINE TYPE: ABNORMAL
UROBILINOGEN, URINE: 0.2 E.U./DL
WBC # BLD: 12.8 K/UL (ref 4–11)
WBC # BLD: 7.8 K/UL (ref 4–11)
WBC UA: ABNORMAL /HPF (ref 0–5)

## 2020-01-06 PROCEDURE — 85025 COMPLETE CBC W/AUTO DIFF WBC: CPT

## 2020-01-06 PROCEDURE — 3700000001 HC ADD 15 MINUTES (ANESTHESIA): Performed by: INTERNAL MEDICINE

## 2020-01-06 PROCEDURE — 83735 ASSAY OF MAGNESIUM: CPT

## 2020-01-06 PROCEDURE — 83605 ASSAY OF LACTIC ACID: CPT

## 2020-01-06 PROCEDURE — 36415 COLL VENOUS BLD VENIPUNCTURE: CPT

## 2020-01-06 PROCEDURE — 2060000000 HC ICU INTERMEDIATE R&B

## 2020-01-06 PROCEDURE — 86900 BLOOD TYPING SEROLOGIC ABO: CPT

## 2020-01-06 PROCEDURE — 6360000002 HC RX W HCPCS: Performed by: STUDENT IN AN ORGANIZED HEALTH CARE EDUCATION/TRAINING PROGRAM

## 2020-01-06 PROCEDURE — 87040 BLOOD CULTURE FOR BACTERIA: CPT

## 2020-01-06 PROCEDURE — 82803 BLOOD GASES ANY COMBINATION: CPT

## 2020-01-06 PROCEDURE — 85018 HEMOGLOBIN: CPT

## 2020-01-06 PROCEDURE — 81001 URINALYSIS AUTO W/SCOPE: CPT

## 2020-01-06 PROCEDURE — 7100000010 HC PHASE II RECOVERY - FIRST 15 MIN: Performed by: INTERNAL MEDICINE

## 2020-01-06 PROCEDURE — 0CBM8ZX EXCISION OF PHARYNX, VIA NATURAL OR ARTIFICIAL OPENING ENDOSCOPIC, DIAGNOSTIC: ICD-10-PCS | Performed by: INTERNAL MEDICINE

## 2020-01-06 PROCEDURE — 51702 INSERT TEMP BLADDER CATH: CPT

## 2020-01-06 PROCEDURE — 2580000003 HC RX 258: Performed by: NURSE ANESTHETIST, CERTIFIED REGISTERED

## 2020-01-06 PROCEDURE — 80048 BASIC METABOLIC PNL TOTAL CA: CPT

## 2020-01-06 PROCEDURE — 2580000003 HC RX 258: Performed by: STUDENT IN AN ORGANIZED HEALTH CARE EDUCATION/TRAINING PROGRAM

## 2020-01-06 PROCEDURE — 99285 EMERGENCY DEPT VISIT HI MDM: CPT

## 2020-01-06 PROCEDURE — 87632 RESP VIRUS 6-11 TARGETS: CPT

## 2020-01-06 PROCEDURE — 86901 BLOOD TYPING SEROLOGIC RH(D): CPT

## 2020-01-06 PROCEDURE — 80076 HEPATIC FUNCTION PANEL: CPT

## 2020-01-06 PROCEDURE — 83690 ASSAY OF LIPASE: CPT

## 2020-01-06 PROCEDURE — 2500000003 HC RX 250 WO HCPCS: Performed by: NURSE ANESTHETIST, CERTIFIED REGISTERED

## 2020-01-06 PROCEDURE — 3609017100 HC EGD: Performed by: INTERNAL MEDICINE

## 2020-01-06 PROCEDURE — 7100000011 HC PHASE II RECOVERY - ADDTL 15 MIN: Performed by: INTERNAL MEDICINE

## 2020-01-06 PROCEDURE — 6360000002 HC RX W HCPCS: Performed by: INTERNAL MEDICINE

## 2020-01-06 PROCEDURE — 1200000000 HC SEMI PRIVATE

## 2020-01-06 PROCEDURE — 2580000003 HC RX 258: Performed by: INTERNAL MEDICINE

## 2020-01-06 PROCEDURE — 3700000000 HC ANESTHESIA ATTENDED CARE: Performed by: INTERNAL MEDICINE

## 2020-01-06 PROCEDURE — C9113 INJ PANTOPRAZOLE SODIUM, VIA: HCPCS | Performed by: STUDENT IN AN ORGANIZED HEALTH CARE EDUCATION/TRAINING PROGRAM

## 2020-01-06 PROCEDURE — 99223 1ST HOSP IP/OBS HIGH 75: CPT | Performed by: HOSPITALIST

## 2020-01-06 PROCEDURE — 86850 RBC ANTIBODY SCREEN: CPT

## 2020-01-06 PROCEDURE — 74176 CT ABD & PELVIS W/O CONTRAST: CPT

## 2020-01-06 PROCEDURE — 6360000002 HC RX W HCPCS: Performed by: NURSE ANESTHETIST, CERTIFIED REGISTERED

## 2020-01-06 PROCEDURE — 71046 X-RAY EXAM CHEST 2 VIEWS: CPT

## 2020-01-06 PROCEDURE — 6370000000 HC RX 637 (ALT 250 FOR IP): Performed by: INTERNAL MEDICINE

## 2020-01-06 PROCEDURE — 85014 HEMATOCRIT: CPT

## 2020-01-06 PROCEDURE — 84145 PROCALCITONIN (PCT): CPT

## 2020-01-06 PROCEDURE — 84100 ASSAY OF PHOSPHORUS: CPT

## 2020-01-06 RX ORDER — LIDOCAINE HYDROCHLORIDE 20 MG/ML
INJECTION, SOLUTION INFILTRATION; PERINEURAL PRN
Status: DISCONTINUED | OUTPATIENT
Start: 2020-01-06 | End: 2020-01-06 | Stop reason: SDUPTHER

## 2020-01-06 RX ORDER — PANTOPRAZOLE SODIUM 40 MG/1
40 TABLET, DELAYED RELEASE ORAL
Status: DISCONTINUED | OUTPATIENT
Start: 2020-01-07 | End: 2020-01-06

## 2020-01-06 RX ORDER — SODIUM CHLORIDE 9 MG/ML
INJECTION, SOLUTION INTRAVENOUS CONTINUOUS
Status: CANCELLED | OUTPATIENT
Start: 2020-01-06 | End: 2020-01-06

## 2020-01-06 RX ORDER — 0.9 % SODIUM CHLORIDE 0.9 %
1000 INTRAVENOUS SOLUTION INTRAVENOUS ONCE
Status: COMPLETED | OUTPATIENT
Start: 2020-01-06 | End: 2020-01-06

## 2020-01-06 RX ORDER — SODIUM CHLORIDE 0.9 % (FLUSH) 0.9 %
10 SYRINGE (ML) INJECTION EVERY 12 HOURS SCHEDULED
Status: DISCONTINUED | OUTPATIENT
Start: 2020-01-06 | End: 2020-01-09 | Stop reason: HOSPADM

## 2020-01-06 RX ORDER — ATORVASTATIN CALCIUM 80 MG/1
80 TABLET, FILM COATED ORAL DAILY
Status: DISCONTINUED | OUTPATIENT
Start: 2020-01-06 | End: 2020-01-09 | Stop reason: HOSPADM

## 2020-01-06 RX ORDER — SODIUM CHLORIDE, SODIUM LACTATE, POTASSIUM CHLORIDE, AND CALCIUM CHLORIDE .6; .31; .03; .02 G/100ML; G/100ML; G/100ML; G/100ML
500 INJECTION, SOLUTION INTRAVENOUS ONCE
Status: COMPLETED | OUTPATIENT
Start: 2020-01-06 | End: 2020-01-06

## 2020-01-06 RX ORDER — PANTOPRAZOLE SODIUM 40 MG/10ML
40 INJECTION, POWDER, LYOPHILIZED, FOR SOLUTION INTRAVENOUS 2 TIMES DAILY
Status: DISCONTINUED | OUTPATIENT
Start: 2020-01-06 | End: 2020-01-06

## 2020-01-06 RX ORDER — SODIUM CHLORIDE 0.9 % (FLUSH) 0.9 %
10 SYRINGE (ML) INJECTION PRN
Status: DISCONTINUED | OUTPATIENT
Start: 2020-01-06 | End: 2020-01-09 | Stop reason: HOSPADM

## 2020-01-06 RX ORDER — PROPOFOL 10 MG/ML
INJECTION, EMULSION INTRAVENOUS PRN
Status: DISCONTINUED | OUTPATIENT
Start: 2020-01-06 | End: 2020-01-06 | Stop reason: SDUPTHER

## 2020-01-06 RX ORDER — ACETAMINOPHEN 325 MG/1
650 TABLET ORAL EVERY 4 HOURS PRN
Status: DISCONTINUED | OUTPATIENT
Start: 2020-01-06 | End: 2020-01-09 | Stop reason: HOSPADM

## 2020-01-06 RX ORDER — PANTOPRAZOLE SODIUM 40 MG/1
40 TABLET, DELAYED RELEASE ORAL
Status: DISCONTINUED | OUTPATIENT
Start: 2020-01-07 | End: 2020-01-09 | Stop reason: HOSPADM

## 2020-01-06 RX ORDER — FINASTERIDE 5 MG/1
5 TABLET, FILM COATED ORAL DAILY
Status: DISCONTINUED | OUTPATIENT
Start: 2020-01-06 | End: 2020-01-09 | Stop reason: HOSPADM

## 2020-01-06 RX ORDER — TAMSULOSIN HYDROCHLORIDE 0.4 MG/1
0.4 CAPSULE ORAL EVERY EVENING
Status: DISCONTINUED | OUTPATIENT
Start: 2020-01-06 | End: 2020-01-09 | Stop reason: HOSPADM

## 2020-01-06 RX ORDER — SODIUM CHLORIDE, SODIUM LACTATE, POTASSIUM CHLORIDE, CALCIUM CHLORIDE 600; 310; 30; 20 MG/100ML; MG/100ML; MG/100ML; MG/100ML
INJECTION, SOLUTION INTRAVENOUS CONTINUOUS PRN
Status: DISCONTINUED | OUTPATIENT
Start: 2020-01-06 | End: 2020-01-06 | Stop reason: SDUPTHER

## 2020-01-06 RX ORDER — ONDANSETRON 2 MG/ML
4 INJECTION INTRAMUSCULAR; INTRAVENOUS EVERY 6 HOURS PRN
Status: DISCONTINUED | OUTPATIENT
Start: 2020-01-06 | End: 2020-01-09 | Stop reason: HOSPADM

## 2020-01-06 RX ORDER — SODIUM CHLORIDE 9 MG/ML
INJECTION, SOLUTION INTRAVENOUS CONTINUOUS
Status: DISCONTINUED | OUTPATIENT
Start: 2020-01-06 | End: 2020-01-07

## 2020-01-06 RX ADMIN — PANTOPRAZOLE SODIUM 40 MG: 40 INJECTION, POWDER, FOR SOLUTION INTRAVENOUS at 09:29

## 2020-01-06 RX ADMIN — PROPOFOL 25 MG: 10 INJECTION, EMULSION INTRAVENOUS at 15:21

## 2020-01-06 RX ADMIN — SODIUM CHLORIDE, POTASSIUM CHLORIDE, SODIUM LACTATE AND CALCIUM CHLORIDE 500 ML: 600; 310; 30; 20 INJECTION, SOLUTION INTRAVENOUS at 10:41

## 2020-01-06 RX ADMIN — SODIUM CHLORIDE 1000 ML: 9 INJECTION, SOLUTION INTRAVENOUS at 10:39

## 2020-01-06 RX ADMIN — PROPOFOL 50 MG: 10 INJECTION, EMULSION INTRAVENOUS at 15:18

## 2020-01-06 RX ADMIN — TAMSULOSIN HYDROCHLORIDE 0.4 MG: 0.4 CAPSULE ORAL at 17:22

## 2020-01-06 RX ADMIN — PIPERACILLIN AND TAZOBACTAM 3.38 G: 3; .375 INJECTION, POWDER, LYOPHILIZED, FOR SOLUTION INTRAVENOUS at 18:39

## 2020-01-06 RX ADMIN — PHENYLEPHRINE HYDROCHLORIDE 200 MCG: 10 INJECTION, SOLUTION INTRAMUSCULAR; INTRAVENOUS; SUBCUTANEOUS at 15:24

## 2020-01-06 RX ADMIN — ENOXAPARIN SODIUM 40 MG: 40 INJECTION SUBCUTANEOUS at 17:22

## 2020-01-06 RX ADMIN — PHENYLEPHRINE HYDROCHLORIDE 200 MCG: 10 INJECTION, SOLUTION INTRAMUSCULAR; INTRAVENOUS; SUBCUTANEOUS at 15:22

## 2020-01-06 RX ADMIN — PIPERACILLIN AND TAZOBACTAM 3.38 G: 3; .375 INJECTION, POWDER, LYOPHILIZED, FOR SOLUTION INTRAVENOUS at 12:19

## 2020-01-06 RX ADMIN — SODIUM CHLORIDE 1000 ML: 0.9 INJECTION, SOLUTION INTRAVENOUS at 09:26

## 2020-01-06 RX ADMIN — SODIUM CHLORIDE: 9 INJECTION, SOLUTION INTRAVENOUS at 17:23

## 2020-01-06 RX ADMIN — SODIUM CHLORIDE: 9 INJECTION, SOLUTION INTRAVENOUS at 10:23

## 2020-01-06 RX ADMIN — SODIUM CHLORIDE, SODIUM LACTATE, POTASSIUM CHLORIDE, AND CALCIUM CHLORIDE: 600; 310; 30; 20 INJECTION, SOLUTION INTRAVENOUS at 15:11

## 2020-01-06 RX ADMIN — PROPOFOL 25 MG: 10 INJECTION, EMULSION INTRAVENOUS at 15:24

## 2020-01-06 RX ADMIN — PHENYLEPHRINE HYDROCHLORIDE 200 MCG: 10 INJECTION, SOLUTION INTRAMUSCULAR; INTRAVENOUS; SUBCUTANEOUS at 15:20

## 2020-01-06 RX ADMIN — LIDOCAINE HYDROCHLORIDE 100 MG: 20 INJECTION, SOLUTION INFILTRATION; PERINEURAL at 15:14

## 2020-01-06 ASSESSMENT — ENCOUNTER SYMPTOMS
ABDOMINAL DISTENTION: 0
TROUBLE SWALLOWING: 1
RHINORRHEA: 0
CHEST TIGHTNESS: 0
ABDOMINAL PAIN: 0
CONSTIPATION: 0
CHOKING: 0
SORE THROAT: 0
NAUSEA: 1
WHEEZING: 0
DIARRHEA: 0
COUGH: 1
BLOOD IN STOOL: 0
SHORTNESS OF BREATH: 0
VOMITING: 1

## 2020-01-06 ASSESSMENT — PULMONARY FUNCTION TESTS
PIF_VALUE: 0
PIF_VALUE: 2
PIF_VALUE: 0

## 2020-01-06 ASSESSMENT — PAIN SCALES - GENERAL
PAINLEVEL_OUTOF10: 0

## 2020-01-06 NOTE — PROCEDURES
[facility]  EGD REPORT    Patient:  Desmond Ramires                  9/17/1927    Referring Physician:  Kelly Og    Endoscopist: Mazin     Indication:  Hematemesis     Medications:  MAC      Pre-Anesthesia Assessment:  I have reviewed and am in agreement with patient history and medication, including previous response to sedation. Prior to the procedure, a History and Physical was performed, and patient medications and allergies were reviewed. The patient is competent. The risks and benefits of the procedure and the sedation options and risks were discussed with the patient. Risks discussed included but were not limited to infection, bleeding, perforation, death, and missed lesions. All questions were answered and informed consent was obtained. Patient identification and proposed procedure were verified by the physician and the nurse in the pre-procedure area in the procedure room. [place]: II  ASA Grade Assessment: 3     After reviewing the risks and benefits, the patient was deemed in satisfactory condition to undergo the procedure. The anesthesia plan was to use MAC anesthesia. Immediately prior to administration of medications, the patient was re-assessed for adequacy to receive sedatives and a time out was performed. Patient and healthcare providers were in agreement it was the correct patient and procedure. The heart rate, respiratory rate, oxygen saturations, blood pressure, adequacy of pulmonary ventilation, and response to care were monitored throughout the procedure. The physical status of the patient was re-assessed after the procedure. After obtaining informed consent, the endoscope was passed under direct vision. The endoscope was introduced through the mouth, and advanced to the second part of duodenum. The EGD was accomplished without difficulty. The patient tolerated the procedure well.        Duodenum: Normal  Stomach: Normal aside from mild atrophic changes

## 2020-01-06 NOTE — ED PROVIDER NOTES
MOUTH ONE TIME A DAY     FINASTERIDE (PROSCAR) 5 MG TABLET    TAKE 1 TABLET BY MOUTH ONCE DAILY    FUROSEMIDE (LASIX) 40 MG TABLET    TAKE 1 & 1/2 (ONE & ONE-HALF) TABLETS BY MOUTH ONCE DAILY IN THE MORNING    OMEPRAZOLE (PRILOSEC OTC) 20 MG TABLET    1 daily to protect stomach. POLYETHYLENE GLYCOL (GLYCOLAX) POWDER    Take as directed every other day for constipation    POTASSIUM CHLORIDE (KLOR-CON M) 10 MEQ EXTENDED RELEASE TABLET    Take 2 tablets by mouth daily    TAMSULOSIN (FLOMAX) 0.4 MG CAPSULE    TAKE 1 capsule EVERY evening (NOT AS NEEDED) for prostate & urination. (Add & stay on finasteride for prostate also)    VITAMIN B-12 (CYANOCOBALAMIN) 1000 MCG TABLET    Take OTC B12 or similar B-complex vitamins 8wm=0595 mcg a day for neurological health and as a natural energy booster (read a bottle of 5 Hour Energy or diet Red Bull). High B12 levels are proven to help prevent dementia & neuropathy. Allergies     He is allergic to aspirin. Physical Exam     INITIAL VITALS: BP: (!) 102/57,  , Pulse: 79, Resp: 20, SpO2: 93 %   General: Elderly male lying in bed no apparent cardiorespiratory distress  HEENT:  head is atraumatic, pupils equal round and reactive to light, sclera are clear, oropharynx is nonerythematous  Neck: supple, no lymphadenopathy  Chest: clear to auscultation bilaterally with no wheezes rhonchi, rales  Cardiovascular: Regular, rate, and rhythm, normal S1S2, no murmurs, rubs, or gallops, 2+ radial pulses bilaterally, capillary refill 2 seconds  Abdominal: Soft, nontender, nondistended, positive bowel sounds throughout, no rebound or guarding  Skin: Warm, dry well perfused, no rashes  Extremities: no obvious deformities, no tenderness to palpation diffusely  Neurologic:  alert and oriented x4, speech is clear and intact without dysarthria, gait is intact  Rectal: brown stool noted in the diaper.   No obvious hemorroids    Diagnostic Results       RADIOLOGY:  XR CHEST STANDARD (2 VW)

## 2020-01-06 NOTE — ANESTHESIA PRE PROCEDURE
Department of Anesthesiology  Preprocedure Note       Name:  Zully Solis   Age:  80 y.o.  :  1927                                          MRN:  [id number]         Date:  2020      Surgeon: Val Vargas):  Dick Harp MD    Procedure: EGD DIAGNOSTIC ONLY (N/A )    Medications prior to admission:   Prior to Admission medications    Medication Sig Start Date End Date Taking? Authorizing Provider   furosemide (LASIX) 40 MG tablet TAKE 1 & 1/2 (ONE & ONE-HALF) TABLETS BY MOUTH ONCE DAILY IN THE MORNING 19  Yes Cindy Watson MD   potassium chloride (KLOR-CON M) 10 MEQ extended release tablet Take 2 tablets by mouth daily 10/14/19  Yes Cindy Watson MD   finasteride (PROSCAR) 5 MG tablet TAKE 1 TABLET BY MOUTH ONCE DAILY 19  Yes Cindy Watson MD   atorvastatin (LIPITOR) 80 MG tablet TAKE 1 TABLET BY MOUTH ONE TIME A DAY  19  Yes Cindy Watson MD   apixaban Chava Last) 2.5 MG TABS tablet Take 1 tablet by mouth 2 times daily 19  Yes Cindy Watson MD   tamsulosin (FLOMAX) 0.4 MG capsule TAKE 1 capsule EVERY evening (NOT AS NEEDED) for prostate & urination. (Add & stay on finasteride for prostate also) 19  Yes Cindy Watson MD   vitamin B-12 (CYANOCOBALAMIN) 1000 MCG tablet Take OTC B12 or similar B-complex vitamins 6qc=6209 mcg a day for neurological health and as a natural energy booster (read a bottle of 5 Hour Energy or diet Red Bull). High B12 levels are proven to help prevent dementia & neuropathy. Patient taking differently: Take 1,000 mcg by mouth daily Take OTC B12 or similar B-complex vitamins 3wy=4256 mcg a day for neurological health and as a natural energy booster (read a bottle of 5 Hour Energy or diet Red Bull). High B12 levels are proven to help prevent dementia & neuropathy.  16  Yes Fatuma Velásquez MD   omeprazole (PRILOSEC OTC) 20 MG tablet 1 daily to protect stomach. 16  Yes Fatuma Velásquez MD   polyethylene glycol [facility]) powder Take as directed every other day for constipation 9/23/13  Yes Raymond Mejia MD       Current medications:    Current Facility-Administered Medications   Medication Dose Route Frequency Provider Last Rate Last Dose    atorvastatin (LIPITOR) tablet 80 mg  80 mg Oral Daily Casimiro Alcala MD        finasteride (PROSCAR) tablet 5 mg  5 mg Oral Daily Casimiro Alcala MD        pantoprazole (PROTONIX) injection 40 mg  40 mg Intravenous BID Casimiro Alcala MD   40 mg at 01/06/20 0929    tamsulosin (FLOMAX) capsule 0.4 mg  0.4 mg Oral QPM Casimiro Alcala MD        sodium chloride flush 0.9 % injection 10 mL  10 mL Intravenous 2 times per day Casimiro Alcala MD        sodium chloride flush 0.9 % injection 10 mL  10 mL Intravenous PRN Casimiro Alcala MD        magnesium hydroxide (MILK OF MAGNESIA) 400 MG/5ML suspension 30 mL  30 mL Oral Daily PRN Casimiro Alcala MD        ondansetron [facility]) injection 4 mg  4 mg Intravenous Q6H PRN Casimiro Alcala MD        acetaminophen (TYLENOL) tablet 650 mg  650 mg Oral Q4H PRN Casimiro Alcala MD        0.9 % sodium chloride infusion   Intravenous Continuous Tez Armstrong  mL/hr at 01/06/20 1023      piperacillin-tazobactam (ZOSYN) 3.375 g in dextrose 5 % 100 mL IVPB extended infusion (mini-bag)  3.375 g Intravenous Q8H Tez Armstrong MD 25 mL/hr at 01/06/20 1219 3.375 g at 01/06/20 1219       Allergies:     Allergies   Allergen Reactions    Aspirin Other (See Comments)     Ulcer       Problem List:    Patient Active Problem List   Diagnosis Code    PUD (peptic ulcer disease) K27.9    GERD (gastroesophageal reflux disease) K21.9    Hyperlipidemia with target LDL less than 100 E78.5    Carotid stenosis, bilateral I65.23    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

## 2020-01-06 NOTE — PLAN OF CARE
Problem: Falls - Risk of:  Goal: Will remain free from falls  Description  Will remain free from falls  Outcome: Ongoing  Note:   Pt is a fall risk. See [facility] [place] Fall Score. Pt bed is in low position, side rails up, call light and belongings are in reach. Bed alarm is on. Pt encouraged to call for assistance. Will continue with hourly rounds for po intake, pain needs, toileting and repositioning as needed. Will continue to monitor for needs      Problem: Risk for Impaired Skin Integrity  Goal: Tissue integrity - skin and mucous membranes  Description  Structural intactness and normal physiological function of skin and  mucous membranes. Outcome: Ongoing  Note:   Pt has redness to sacrum and bilat heels. Scattered bruising, pt is on eliquis at home. Assist with turning and repositioning. Incontinence care provided q2h and prn. Langston placed by urology.

## 2020-01-06 NOTE — CARE COORDINATION
Case Management Assessment           Initial Evaluation                Date / Time of Evaluation: 1/6/2020 4:00 PM                 Assessment Completed by: Miguel Howell    Patient Name: Zully Solis     YOB: 1927  Diagnosis: Coffee ground emesis [K92.0]  Coffee ground emesis [K92.0]  GI bleed [K92.2]     Date / Time: 1/6/2020  4:32 AM    Patient Admission Status: Inpatient    If patient is discharged prior to next notation, then this note serves as note for discharge by case management. Current PCP: Cindy Watson MD  Clinic Patient: No    Chart Reviewed: Yes  Patient/ Family Interviewed: Yes    Initial assessment completed at bedside with: Patient and wife via phone    Hospitalization in the last 30 days: No    Emergency Contacts:  Extended Emergency Contact Information  Primary Emergency Contact: Cassandra Kohli  Address: [place], [address] [place] [address] Phone: [phone]  Mobile Phone: [phone]  Relation: Spouse    Advance Directives:   Code Status: [address]: Yes    Copy present: No     In paper Chart: No    Scanned into EMR No    Financial  Payor: Glenna Jimenez / Plan: [place] / Product Type: *No Product type* /     Pre-cert required for SNF: Yes    [address] [facility] [phone] Pete Soto [phone]  [address] [place] [address]  Phone: [phone] Fax: [phone]    [facility] [phone] [phone] Pete Soto [phone]  [address] [place] [address]  Phone: [phone] Fax: [phone]      Potential assistance Purchasing Medications: Potential Assistance Purchasing Medications: No  Does Patient want to participate in local refill/ meds to beds program?: No    Meds To Beds General Rules:  1. Can ONLY be done Monday- Friday between 8:30am-5pm  2.  Prescription(s) mobility. Wife assists with shower, otherwise, patient is independent in ADLs. No assistance in the home. Patient has been to [facility] in past for SNF and has had home care in past.  Wife agreeable to SNF at [facility] if needed. Referral made. List of [facility] facilities provided. Referral made to [facility]. No other needs at this time. The Plan for Transition of Care is related to the following treatment goals of Coffee ground emesis [K92.0]  Coffee ground emesis [K92.0]  GI bleed [K92.2]    The Patient and/or patient representative Aleksandar Goldstein and his family were provided with a choice of provider and agrees with the discharge plan Yes    Freedom of choice list was provided with basic dialogue that supports the patient's individualized plan of care/goals and shares the quality data associated with the providers.  Yes      Care Transition patient: Yes    Electronically signed by CARLOS Durán LISW-S on 1/6/2020 at 4:00 PM

## 2020-01-06 NOTE — PROGRESS NOTES
Speech Language Pathology  Dysphagia - hold    Chart reviewed, d/w RN, pt NPO, question if for GI due to admission with coffee ground emesis. Will follow up as pt able and schedule allows. Sukhjinder Mark M.S./[facility] #[id number]  Pg. # [phone]      Attempt x2  Chart reviewed, d/w RN Shelley Orozco. Pt currently NPO and now off floor for endoscopy. Will attempt as pt available and schedule allows.     Ayleen RaviNorth [facility] Health Hospitalsara[address], [place]; [phone]  Speech-Language Pathologist  Pager #: [phone], 2:40pm

## 2020-01-06 NOTE — H&P
History and Physical  PGY-3    Hospital Day: 1                                                         Code: Limited Code x4  Admit Date: 1/6/2020  4:32 AM            PCP: Bianca Eubanks MD              Chief Complaint: \"vomiting blood\"    History of present illness:   Anaya Cutler is a 80 y.o. male h/o atrial fibrillation (eliquis), HFpEF, p/w concerns for coffee ground emesis. Patient states he had an episode of coughing, where then he began to vomit that may have had blood in it. It was dark in nature. He called EMS, where he had a large emesis, possibly dark/coffee ground. He denies fevers, chills, SOB, chest pain. His cough has been chronic and has not changed. He reports swelling in extremities but that has also not changed. Patient reports difficulty swallowing and sometimes regurgitates food. In ED, vital signs were normal (HR 79, RR 20, /84, SpO2 97%). Labs were normal. Hb 13.5, similar to prior. CXR did not reveal consolidation. Patient admitted under observation. Review of systems:   History obtained from the patient    Review of Systems   Constitutional: Positive for activity change. Negative for appetite change, chills, diaphoresis, fatigue, fever and unexpected weight change. HENT: Negative for rhinorrhea and sore throat. Eyes: Negative for visual disturbance. Respiratory: Positive for cough. Negative for choking, chest tightness, shortness of breath and wheezing. Cardiovascular: Positive for leg swelling. Negative for chest pain and palpitations. Gastrointestinal: Positive for nausea and vomiting. Negative for abdominal distention, abdominal pain, blood in stool, constipation and diarrhea. Genitourinary: Negative for decreased urine volume and difficulty urinating. Musculoskeletal: Negative for arthralgias.    Neurological: Negative for dizziness, weakness, light-headedness and headaches.       ===================================================================    Past medical history:  Past Medical History:   Diagnosis Date    Atrial fibrillation ([facility] [address] 05/2016    Carotid artery disease (HCC)     R>L    GERD (gastroesophageal reflux disease) 2/21/2013    Headaches due to old head injury 2010    Heart murmur, systolic 2/5/0488 5/5/70 Echo at [facility] [place]: Normal left ventricle size and systolic function with an estimated ejection fraction of 55%. Concentric left ventricular hypertrophy is present. No regional wall motion abnormalities are seen. There is reversal of E/A inflow velocities across the mitral valve suggesting impaired left ventricular relaxation. Aortic valve appears sclerotic but opens adequately. Moderate a    Hyperlipidemia LDL goal < 100 2/21/2013    Hypertension     Moderate aortic insufficiency 1/9/2014 1/9/14 Echo at [facility] [place]: Normal left ventricle size and systolic function with an estimated ejection fraction of 55%. Concentric left ventricular hypertrophy is present. No regional wall motion abnormalities are seen. There is reversal of E/A inflow velocities across the mitral valve suggesting impaired left ventricular relaxation. Aortic valve appears sclerotic but opens adequately. 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 Weakness substantially exacerbated after left anterior frontal lobe intraparenchymal bleed 5/4/16 [facility] admission.     SBO (small bowel obstruction) ([facility] [address] 08/2018    resolved conservative tx       Past Surgical History:   Procedure Laterality Date    CAPSULOTOMY Bilateral 11/2011    [facility] - Dr. Fran Sy, YAG capsulotomies for secondary cataracts    CATARACT REMOVAL WITH IMPLANT Left 10/6/2006    with vitrectomy & membrane peel [facility] - Dr Brenda Arreaga and Jacky Friends    COLONOSCOPY  1/11/05    diverticulosis, no polyps  Debby Reyes. No re-screening needed per Dr. Debby Reyes.  CORONARY ANGIOPLASTY WITH STENT PLACEMENT  11/2016    [facility]     HERNIA REPAIR      SKIN BIOPSY Right 11/2012    Right ear keloid - Dr. Rekha Mckeon Right 10/2010    Squamous cell - right ear - Dr. Michael Anders VITRECTOMY Right 6/6/2007    with membrane peel - eye [facility] - Dr Maverick Cali History:   The patient lives at home with wife    Social History     Socioeconomic History    Marital status:      Spouse name: Not on file    Number of children: Not on file    Years of education: Not on file    Highest education level: Not on file   Occupational History    Not on file   Social Needs    Financial resource strain: Not on file    Food insecurity:     Worry: Not on file     Inability: Not on file    Transportation needs:     Medical: Not on file     Non-medical: Not on file   Tobacco Use    Smoking status: Never Smoker    Smokeless tobacco: Never Used    Tobacco comment: Not needed   Substance and Sexual Activity    Alcohol use:  Yes     Alcohol/week: 1.0 standard drinks     Types: 1 Glasses of wine per week     Comment: once in a while    Drug use: No    Sexual activity: Not on file   Lifestyle    Physical activity:     Days per week: Not on file     Minutes per session: Not on file    Stress: Not on file   Relationships    Social connections:     Talks on phone: Not on file     Gets together: Not on file     Attends Rastafari service: Not on file     Active member of club or organization: Not on file     Attends meetings of clubs or organizations: Not on file     Relationship status: Not on file    Intimate partner violence:     Fear of current or ex partner: Not on file     Emotionally abused: Not on file     Physically abused: Not on file     Forced sexual activity: Not on file   Other Topics Concern    Not on file   Social History Narrative    Not on file       Alcohol:  none  Illicit drugs: no use  Tobacco:

## 2020-01-06 NOTE — CONSULTS
Urology Attending Consult Note      Reason for Consultation: placement of allan    History:  79 y/o with sepsis--etiology? ???--> Needs allan cath . Staff unable. Hx is poor--says no trouble voiding or Hx of UTIs    Family History, Social History, Review of Systems:  Reviewed and agreed to as per chart    Vitals:  BP (!) 95/59   Pulse 81   Temp 99.8 °F (37.7 °C) (Axillary)   Resp 18   Ht 5' 9\" (1.753 m)   Wt 160 lb 4.4 oz (72.7 kg)   SpO2 97%   BMI 23.67 kg/m²   Temp  Av.3 °F (37.4 °C)  Min: 96.5 °F (35.8 °C)  Max: 101.1 °F (38.4 °C)  No intake or output data in the 24 hours ending 20 1232      Physical:   Well developed, well nourished in no acute distress   Mood indicates no abnormalities. Pt doesnt appear depressed   Orientated to time and place   Neck is supple, trachea is midline   Respiratory effort is normal   Cardiovascular show no extremity swelling   Abdomen no masses or hernias are palpated, there is no tenderness. Liver and Spleen appear normal.   Skin show no abnormal lesions   Lymph nodes are not palpated in the inguinal, neck, or axillary area. Male :   Penis appears normal and uncircumcised--nad has tight phimosis     Scrotum appears normal and both testicles appear normal in size and location   Sphincter has good tone   Anus is inspected.  There are no perineal masses   Prostate is 1+ , no tenderness and no induration   Seminal Vesicles are not palpable      Labs:  WBC:    Lab Results   Component Value Date    WBC 7.8 2020     Hemoglobin/Hematocrit:    Lab Results   Component Value Date    HGB 12.4 2020    HCT 38.0 2020     BMP:    Lab Results   Component Value Date     2020    K 3.9 2020     2020    CO2 23 2020    BUN 18 2020    LABALBU 3.3 2020    CREATININE 1.1 2020    CALCIUM 8.5 2020    GFRAA >60 2020    LABGLOM >60 2020     PT/INR:    Lab Results   Component

## 2020-01-06 NOTE — FLOWSHEET NOTE
01/06/20 0921   Encounter Summary   Services provided to: Patient not available   Complexity of Encounter Low   Length of Encounter 15 minutes   Crisis   Type Rapid response   Assessment Unable to respond   Intervention Sustaining presence/ Ministry of presence   Outcome Did not respond; De-escalated

## 2020-01-06 NOTE — PROGRESS NOTES
4 Eyes Admission Assessment     I agree as the admission nurse that 2 RN's have performed a thorough Head to Toe Skin Assessment on the patient. ALL assessment sites listed below have been assessed on admission. Areas assessed by both nurses:   [x]   Head, Face, and Ears   [x]   Shoulders, Back, and Chest  [x]   Arms, Elbows, and Hands   [x]   Coccyx, Sacrum, and Ischum  [x]   Legs, Feet, and Heels        Does the Patient have Skin Breakdown?   Redness to sacrum and heels, scattered bruising        Georges Prevention initiated:  Yes   Wound Care Orders initiated:  NA      [facility] nurse consulted for Pressure Injury (Stage 3,4, Unstageable, DTI, NWPT, and Complex wounds):  NA      Nurse 1 eSignature: Electronically signed by Too Hong RN on 1/6/20 at 11:00 AM    **SHARE this note so that the co-signing nurse is able to place an eSignature**    Nurse 2 eSignature: {Esignature:[id number]}

## 2020-01-06 NOTE — SIGNIFICANT EVENT
Rapid called for hypotension at approximately 0901. BP in the 70s. Last echo with EF 55%, does have edematous lower extremities but no increased o2 requirement, satting 90%s on RA. Alert & appropriately interactive. Started fluids stat labs including bmp, cbc, lactate ordered.  Will recheck BP in 15 minutes to assess fluid responsiveness and evaluate labs, consider transfer to ICU if not fluid responsive or if worsening labs    Harmeet Joyner MD, PGY-1

## 2020-01-06 NOTE — PROGRESS NOTES
negative. Physical Exam  Vitals signs and nursing note reviewed. Constitutional:       General: He is not in acute distress. Appearance: Normal appearance. He is normal weight. HENT:      Head: Normocephalic. Nose: Nose normal.      Mouth/Throat:      Mouth: Mucous membranes are moist.   Eyes:      Conjunctiva/sclera: Conjunctivae normal.   Cardiovascular:      Rate and Rhythm: Normal rate and regular rhythm. Pulses: Normal pulses. Heart sounds: Normal heart sounds. No murmur. No friction rub. No gallop. Pulmonary:      Effort: Pulmonary effort is normal. No respiratory distress. Breath sounds: Normal breath sounds. No wheezing, rhonchi or rales. Abdominal:      General: Abdomen is flat. There is no distension. Palpations: Abdomen is soft. Tenderness: There is no tenderness. There is no guarding or rebound. Musculoskeletal: Normal range of motion. Skin:     General: Skin is warm and dry. Coloration: Skin is not pale. Neurological:      General: No focal deficit present. Mental Status: He is alert and oriented to person, place, and time. Mental status is at baseline. LABS:    CBC:   Recent Labs     01/06/20  0456 01/06/20  0925   WBC 12.8* 7.8   HGB 13.5 12.9*   HCT 41.7 39.3*    178   MCV 92.5 90.9     Renal:    Recent Labs     01/06/20  0456 01/06/20  0925    142   K 4.0 3.9    105   CO2 23 23   BUN 15 18   CREATININE 1.0 1.1   GLUCOSE 123* 92   CALCIUM 8.7 8.5   MG 1.80  --    PHOS 3.5  --    ANIONGAP 15 14     Hepatic:   Recent Labs     01/06/20  0456   AST 19   ALT 9*   BILITOT 1.3*   BILIDIR 0.5*   PROT 6.0*   LABALBU 3.3*   ALKPHOS 162*     Troponin: No results for input(s): TROPONINI in the last 72 hours. BNP: No results for input(s): BNP in the last 72 hours. Lipids: No results for input(s): CHOL, HDL in the last 72 hours.     Invalid input(s): LDLCALCU, TRIGLYCERIDE  ABGs:  No results for input(s): PHART, GWK1XSY, PO2ART, KGV2XBL, BEART, THGBART, G3VMMSTS, FYB6ATT in the last 72 hours. INR: No results for input(s): INR in the last 72 hours. Lactate: No results for input(s): LACTATE in the last 72 hours. Cultures:  -----------------------------------------------------------------  RAD:   XR CHEST STANDARD (2 VW)   Final Result       Unchanged heart size. No consolidation or pleural effusion. Elevated    right hemidiaphragm is again seen. Assessment/Plan:       Coffee ground emesis  Began yesterday. Was coughing and then had episode of vomiting with blood. On eliquis. Hx of it in the past. EGD (2017) revealing Proximal esophageal narrowing s/p dilitation. CT CAP negative for active bleeding.   - hold eliquis  - trend h/h q12h  - NPO  - SLP, PT/OT  - IVF   - CT CAP showed possible ileus but no active bleeding  - Consult GI after stabilization     Atrial Fibrillation  - hold home eliquis     HFpEF  - hold home lasix      HLD  - cont lipitor     BPH  - cont finasteride and flomax     CODE STATUS: Limited Code x4   FEN:  NPO  PPX: SCDs  DISPO: [facility] JLUIS  01/06/20  10:04 AM    This patient has been staffed and discussed with Jasiel Milian MD.     Addendum to Resident H& P/Progress note:  I have personally seen,examined and evaluated the patient.  I have reviewed the current history, physical findings, labs and assessment and plan and agree with note as documented by Carlita Hendrix MD, [address]

## 2020-01-06 NOTE — PROGRESS NOTES
Progress Note    Admit Date: 1/6/2020  Diet: Diet NPO Effective Now    CC: Hematemesis    Interval history: Patient was seen today afternoon. EGD was normal without evidence of bleeding. CT scan showed multifocal pneumonia. Patient still has cough and some SOB. No blood bowel movements. Denies chest pain but has non-specific abdominal pain. He did have fever today. Langston was placed with difficulty. He was mildly confused.   Medications:     Scheduled Meds:   atorvastatin  80 mg Oral Daily    finasteride  5 mg Oral Daily    tamsulosin  0.4 mg Oral QPM    sodium chloride flush  10 mL Intravenous 2 times per day    piperacillin-tazobactam  3.375 g Intravenous Q8H    [START ON 1/7/2020] apixaban  2.5 mg Oral BID    enoxaparin  40 mg Subcutaneous Once    [START ON 1/7/2020] pantoprazole  40 mg Oral QAM AC     Continuous Infusions:   sodium chloride 100 mL/hr at 01/06/20 1023     PRN Meds:sodium chloride flush, magnesium hydroxide, ondansetron, acetaminophen    Objective:   Vitals:   T-max:  Patient Vitals for the past 8 hrs:   BP Temp Temp src Pulse Resp SpO2 Height Weight   01/06/20 1549 (!) 90/50 -- -- 73 16 95 % -- --   01/06/20 1539 110/61 -- -- 71 16 96 % -- --   01/06/20 1534 (!) 79/38 -- -- 77 16 99 % -- --   01/06/20 1339 92/61 99.6 °F (37.6 °C) Axillary 81 -- -- -- --   01/06/20 1221 (!) 95/59 99.8 °F (37.7 °C) Axillary 81 -- -- -- --   01/06/20 1127 (!) 96/57 99.8 °F (37.7 °C) Axillary 79 18 97 % -- --   01/06/20 1038 (!) 97/54 101.1 °F (38.4 °C) Axillary 93 20 95 % -- --   01/06/20 1024 (!) 79/48 -- -- -- -- -- -- --   01/06/20 0949 (!) 90/52 -- -- -- -- -- -- --   01/06/20 0926 (!) 89/53 -- -- -- -- -- -- --   01/06/20 0921 (!) 89/42 -- -- -- -- 94 % -- --   01/06/20 0859 (!) 86/44 -- -- -- -- -- -- --   01/06/20 0855 (!) 78/48 -- -- -- -- -- -- --   01/06/20 0840 -- -- -- -- -- -- 5' 9\" (1.753 m) 160 lb 4.4 oz (72.7 kg)   01/06/20 0827 (!) 88/47 96.5 °F (35.8 °C) Oral 91 18 92 % -- -- Intake/Output Summary (Last 24 hours) at 1/6/2020 1607  Last data filed at 1/6/2020 1529  Gross per 24 hour   Intake 200 ml   Output --   Net 200 ml       Review of Systems    Physical Exam  Constitutional:       Appearance: Normal appearance. HENT:      Head: Normocephalic. Mouth/Throat:      Mouth: Mucous membranes are dry. Eyes:      Extraocular Movements: Extraocular movements intact. Conjunctiva/sclera: Conjunctivae normal.   Neck:      Musculoskeletal: Normal range of motion and neck supple. Cardiovascular:      Rate and Rhythm: Normal rate and regular rhythm. Pulses: Normal pulses. Pulmonary:      Effort: No respiratory distress. Breath sounds: Rales present. Chest:      Chest wall: No tenderness. Abdominal:      General: Bowel sounds are normal.      Palpations: Abdomen is soft. Tenderness: There is tenderness. Musculoskeletal: Normal range of motion. General: No swelling. Skin:     General: Skin is warm and dry. Capillary Refill: Capillary refill takes less than 2 seconds. Neurological:      General: No focal deficit present. Mental Status: He is alert and oriented to person, place, and time.    Psychiatric:         Mood and Affect: Mood normal.       LABS:    CBC:   Recent Labs     01/06/20  0456 01/06/20  0925 01/06/20  1215   WBC 12.8* 7.8  --    HGB 13.5 12.9* 12.4*   HCT 41.7 39.3* 38.0*    178  --    MCV 92.5 90.9  --      Renal:    Recent Labs     01/06/20  0456 01/06/20  0925    142   K 4.0 3.9    105   CO2 23 23   BUN 15 18   CREATININE 1.0 1.1   GLUCOSE 123* 92   CALCIUM 8.7 8.5   MG 1.80  --    PHOS 3.5  --    ANIONGAP 15 14     Hepatic:   Recent Labs     01/06/20  0456   AST 19   ALT 9*   BILITOT 1.3*   BILIDIR 0.5*   PROT 6.0*   LABALBU 3.3*   ALKPHOS 162*     ABGs:    Recent Labs     01/06/20  1055   PHART 7.459*   GGB4BQR 33.5*   PO2ART 76.5   JIC1JKC 23   BEART 0.5   [id number] 96   QWA8ZVB 24

## 2020-01-07 ENCOUNTER — APPOINTMENT (OUTPATIENT)
Dept: GENERAL RADIOLOGY | Age: 85
DRG: 871 | End: 2020-01-07
Payer: MEDICARE

## 2020-01-07 LAB
ANION GAP SERPL CALCULATED.3IONS-SCNC: 13 MMOL/L (ref 3–16)
BASOPHILS ABSOLUTE: 0 K/UL (ref 0–0.2)
BASOPHILS RELATIVE PERCENT: 0.3 %
BUN BLDV-MCNC: 24 MG/DL (ref 7–20)
CALCIUM SERPL-MCNC: 7.9 MG/DL (ref 8.3–10.6)
CHLORIDE BLD-SCNC: 109 MMOL/L (ref 99–110)
CO2: 22 MMOL/L (ref 21–32)
CREAT SERPL-MCNC: 1.2 MG/DL (ref 0.8–1.3)
EOSINOPHILS ABSOLUTE: 0.1 K/UL (ref 0–0.6)
EOSINOPHILS RELATIVE PERCENT: 0.6 %
GFR AFRICAN AMERICAN: >60
GFR NON-AFRICAN AMERICAN: 57
GLUCOSE BLD-MCNC: 117 MG/DL (ref 70–99)
HCT VFR BLD CALC: 35.8 % (ref 40.5–52.5)
HEMOGLOBIN: 11.6 G/DL (ref 13.5–17.5)
LYMPHOCYTES ABSOLUTE: 0.8 K/UL (ref 1–5.1)
LYMPHOCYTES RELATIVE PERCENT: 7.2 %
MCH RBC QN AUTO: 30.6 PG (ref 26–34)
MCHC RBC AUTO-ENTMCNC: 32.4 G/DL (ref 31–36)
MCV RBC AUTO: 94.5 FL (ref 80–100)
MONOCYTES ABSOLUTE: 1 K/UL (ref 0–1.3)
MONOCYTES RELATIVE PERCENT: 9.2 %
NEUTROPHILS ABSOLUTE: 8.8 K/UL (ref 1.7–7.7)
NEUTROPHILS RELATIVE PERCENT: 82.7 %
PDW BLD-RTO: 16.5 % (ref 12.4–15.4)
PLATELET # BLD: 146 K/UL (ref 135–450)
PMV BLD AUTO: 8.8 FL (ref 5–10.5)
POTASSIUM REFLEX MAGNESIUM: 3.7 MMOL/L (ref 3.5–5.1)
RBC # BLD: 3.79 M/UL (ref 4.2–5.9)
SODIUM BLD-SCNC: 144 MMOL/L (ref 136–145)
URINE CULTURE, ROUTINE: NORMAL
WBC # BLD: 10.6 K/UL (ref 4–11)

## 2020-01-07 PROCEDURE — 74230 X-RAY XM SWLNG FUNCJ C+: CPT

## 2020-01-07 PROCEDURE — 2580000003 HC RX 258: Performed by: INTERNAL MEDICINE

## 2020-01-07 PROCEDURE — 85025 COMPLETE CBC W/AUTO DIFF WBC: CPT

## 2020-01-07 PROCEDURE — 92611 MOTION FLUOROSCOPY/SWALLOW: CPT

## 2020-01-07 PROCEDURE — 6370000000 HC RX 637 (ALT 250 FOR IP): Performed by: INTERNAL MEDICINE

## 2020-01-07 PROCEDURE — 6360000002 HC RX W HCPCS: Performed by: INTERNAL MEDICINE

## 2020-01-07 PROCEDURE — 92610 EVALUATE SWALLOWING FUNCTION: CPT

## 2020-01-07 PROCEDURE — 1200000000 HC SEMI PRIVATE

## 2020-01-07 PROCEDURE — 97530 THERAPEUTIC ACTIVITIES: CPT

## 2020-01-07 PROCEDURE — 99232 SBSQ HOSP IP/OBS MODERATE 35: CPT | Performed by: HOSPITALIST

## 2020-01-07 PROCEDURE — 6370000000 HC RX 637 (ALT 250 FOR IP): Performed by: STUDENT IN AN ORGANIZED HEALTH CARE EDUCATION/TRAINING PROGRAM

## 2020-01-07 PROCEDURE — 2580000003 HC RX 258: Performed by: STUDENT IN AN ORGANIZED HEALTH CARE EDUCATION/TRAINING PROGRAM

## 2020-01-07 PROCEDURE — 97535 SELF CARE MNGMENT TRAINING: CPT

## 2020-01-07 PROCEDURE — 97162 PT EVAL MOD COMPLEX 30 MIN: CPT

## 2020-01-07 PROCEDURE — 36415 COLL VENOUS BLD VENIPUNCTURE: CPT

## 2020-01-07 PROCEDURE — 97166 OT EVAL MOD COMPLEX 45 MIN: CPT

## 2020-01-07 PROCEDURE — 80048 BASIC METABOLIC PNL TOTAL CA: CPT

## 2020-01-07 PROCEDURE — 87086 URINE CULTURE/COLONY COUNT: CPT

## 2020-01-07 PROCEDURE — 92526 ORAL FUNCTION THERAPY: CPT

## 2020-01-07 RX ORDER — SODIUM CHLORIDE, SODIUM LACTATE, POTASSIUM CHLORIDE, AND CALCIUM CHLORIDE .6; .31; .03; .02 G/100ML; G/100ML; G/100ML; G/100ML
500 INJECTION, SOLUTION INTRAVENOUS ONCE
Status: COMPLETED | OUTPATIENT
Start: 2020-01-07 | End: 2020-01-07

## 2020-01-07 RX ADMIN — PIPERACILLIN AND TAZOBACTAM 3.38 G: 3; .375 INJECTION, POWDER, LYOPHILIZED, FOR SOLUTION INTRAVENOUS at 03:12

## 2020-01-07 RX ADMIN — SODIUM CHLORIDE: 9 INJECTION, SOLUTION INTRAVENOUS at 03:12

## 2020-01-07 RX ADMIN — FINASTERIDE 5 MG: 5 TABLET, FILM COATED ORAL at 08:15

## 2020-01-07 RX ADMIN — SODIUM CHLORIDE, POTASSIUM CHLORIDE, SODIUM LACTATE AND CALCIUM CHLORIDE 500 ML: 600; 310; 30; 20 INJECTION, SOLUTION INTRAVENOUS at 17:21

## 2020-01-07 RX ADMIN — APIXABAN 2.5 MG: 2.5 TABLET, FILM COATED ORAL at 20:15

## 2020-01-07 RX ADMIN — PANTOPRAZOLE SODIUM 40 MG: 40 TABLET, DELAYED RELEASE ORAL at 06:36

## 2020-01-07 RX ADMIN — ATORVASTATIN CALCIUM 80 MG: 80 TABLET, FILM COATED ORAL at 20:15

## 2020-01-07 RX ADMIN — PIPERACILLIN AND TAZOBACTAM 3.38 G: 3; .375 INJECTION, POWDER, LYOPHILIZED, FOR SOLUTION INTRAVENOUS at 18:28

## 2020-01-07 RX ADMIN — TAMSULOSIN HYDROCHLORIDE 0.4 MG: 0.4 CAPSULE ORAL at 17:21

## 2020-01-07 RX ADMIN — PIPERACILLIN AND TAZOBACTAM 3.38 G: 3; .375 INJECTION, POWDER, LYOPHILIZED, FOR SOLUTION INTRAVENOUS at 11:37

## 2020-01-07 RX ADMIN — APIXABAN 2.5 MG: 2.5 TABLET, FILM COATED ORAL at 08:15

## 2020-01-07 ASSESSMENT — ENCOUNTER SYMPTOMS
NAUSEA: 0
CHEST TIGHTNESS: 0
DIARRHEA: 0
BLOOD IN STOOL: 0
SHORTNESS OF BREATH: 0
RHINORRHEA: 0
VOMITING: 0
COUGH: 1
WHEEZING: 0
ABDOMINAL PAIN: 0

## 2020-01-07 ASSESSMENT — PAIN SCALES - GENERAL
PAINLEVEL_OUTOF10: 0

## 2020-01-07 NOTE — PROGRESS NOTES
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  ONSET DATE: 2020    Recent Chest Xray 2020      Unchanged heart size.  No consolidation or pleural effusion.  Elevated    right hemidiaphragm is again seen.           CT of Chest 2020  CHEST:       1. Multifocal airspace disease with branching nodular opacities and groundglass opacities most pronounced in the posterior segment of the right upper lobe, the right lower lobe, and the basilar left lower lobe. Findings favor a multifocal pneumonia which    could reflect aspiration the proper clinical setting. 2. Main pulmonary artery enlargement, correlate for pulmonary artery hypertension. 3. Mild paratracheal lymphadenopathy based similar to previous exam presumably reactive. 4. Extensive coronary artery calcification. 5. Trace right pleural effusion.         EGD report 2020:  Duodenum: Normal  Stomach: Normal aside from mild atrophic changes consistent with age. Old food which is brown but no blood or bleeding presen. Retroflexion did not show a hiatal hernia. Esophagus: GE junction at 40cms. No Evidence of Ram's or esophagitis.  Incidental note of a diverticulum at 35cms. Large amount of tenacious mucus in the posterior oropharynx which was biopsied. Estimated blood loss none  Plan:  Just dark old food and fluid. No hematin or blood. No potential source of bleeding. Large amount of blood around dressings on hands could cause drop in Hgb. Suspect he gagged on mucus he was coughing up leading to the vomiting. Treat underlying pulmonary process  No need for PPI aside from daily home dose  Call back with questions    Date of Eval: 1/7/2020  Evaluating Therapist: Kait Jewell    Current Diet level:  Current Diet : Regular  Current Liquid Diet : Thin    Primary Complaint  Patient Complaint: pt denies swallowing problems and does not recall prior evals    Pain:  Pain Assessment  Pain Assessment: none reported    Reason for Referral  David Baumann was referred for a bedside swallow evaluation to assess the efficiency of his swallow function, identify signs and symptoms of aspiration and make recommendations regarding safe dietary consistencies, effective compensatory strategies, and safe eating environment. Impression  Dysphagia Diagnosis: Suspected needs further assessment  Dysphagia Impression : Pt alert, oriented to all but year, followed simple commands and conversed appropriately. Pt has been seen several times by SLP in 2017, where pt was at risk for aspiration. Pt does not recall this or prior recommendations for diet. Pt presented with ice chips, water via cup and straw, applesauce and ellen cracker. pt exhibited no overt signs of aspiration, voice clear, good swallow movement noted upon palpation of anterior neck. Pt with several missing teeth, states he eats soft foods. PT demonstrated effective mastication with soft solid. Pt has endoscopy 1/6, suspicion for aspiration per MD's. Due to this and history of dysphagia, will proceed with MBS to fully analyze swallow function  Dysphagia Outcome Severity Scale: Level 5: Mild dysphagia- Distant supervision. May need one diet consistency restricted     Treatment Plan  Requires SLP Intervention: Yes  Duration/Frequency of Treatment: TBD  D/C Recommendations: To be determined       Recommended Diet and Intervention  Diet Solids Recommendation: (TBD after MBS)     Recommended Form of Meds: Meds in puree  Recommendations: Modified barium swallow study  Therapeutic Interventions: Patient/Family education;Oral care    Compensatory Swallowing Strategies  Compensatory Swallowing Strategies: (TBD)    Treatment/Goals  Dysphagia Goals: The patient will tolerate instrumental swallowing procedure    General  Chart Reviewed: Yes  Behavior/Cognition: Alert; Cooperative  Respiratory Status: Room air  Communication Observation: Functional  Follows Directions: Simple  Dentition: Some missing teeth  Patient Positioning: Upright in bed  Baseline Vocal Quality: Normal  Volitional Cough: Strong  Consistencies Administered: Reg solid; Dysphagia Pureed (Dysphagia I); Thin - cup; Thin - straw; Ice Chips    Prior Dysphagia History: pt has been seen several times by SLP as outlined below:  Previous MBS 5/1/17:  Oral Preparation / Oral Phase  WFL for puree and liquids, no anterior spillage  Pharyngeal Phase  Pt with mod - severe pharyngeal phase dysphagia, with risk for aspiration of all consistencies. Pt presented with puree, honey and nectar thick liquids. Pt with impaired tongue base strength and poor pharyngeal contraction, resulting in residue in valleculae and pyriform sinuses. Pt had difficulty initiating the swallow with first trial of puree and required cues to swallow hard. Mod penetration occurred, which did not clear spontaneously and did not clear when asked to cough and swallow hard. This occurred again when given additional trial at end of study. Pt exhibited intermittent mod-deep penetration of honey thick liquids. Head turns to right and left were attempted, which eliminated the penetration only partially.  A chin tuck resulted in

## 2020-01-07 NOTE — PROCEDURES
INSTRUMENTAL SWALLOW REPORT  MODIFIED BARIUM SWALLOW/treatment note- late entry from 36    NAME: Fiona Mosqueda   : 1927  MRN: [id number]       Date of Eval: 2020     Ordering Physician: Dr. Paola Gunter  Radiologist: Dr. Yaya Chen;     Referring Diagnosis(es): Referring Diagnosis: coffee ground emesis    Past Medical History:  has a past medical history of Atrial fibrillation ([facility] [address] Carotid artery disease ([address] GERD (gastroesophageal reflux disease), Headaches due to old head injury, Heart murmur, systolic, Hyperlipidemia LDL goal < 100, Hypertension, Moderate aortic insufficiency, Multi-infarct state, PUD (peptic ulcer disease), Right leg weakness, and SBO (small bowel obstruction) ([facility] [address] Past Surgical History:  has a past surgical history that includes hernia repair; capsulotomy (Bilateral, 2011); skin biopsy (Right, 2012); Skin cancer excision (Right, 10/2010); vitrectomy (Right, 2007); Cataract removal with implant (Left, 10/6/2006); Colonoscopy (05); Coronary angioplasty with stent (2016); and Upper gastrointestinal endoscopy (N/A, 2020). Current Diet Solid Consistency: Regular  Current Diet Liquid Consistency: Thin  Type of Study: Repeat MBS  Results of Prior Study: aspiration of thin and nectar liquids was identified    Patient Complaints/Reason for Referral:  Fiona Mosqueda was referred for a MBS to assess the efficiency of his/her swallow function, assess for aspiration, and to make recommendations regarding safe dietary consistencies, effective compensatory strategies, and safe eating environment.   Patient complaints: pt denies any problems with swallowing    Onset of problem:   Date of Onset: 2020      Recent Chest Xray 2020      Unchanged heart size.  No consolidation or pleural effusion.  Elevated    right hemidiaphragm is again seen.          CT of Chest 2020  CHEST:       1. Multifocal airspace disease with branching nodular opacities and groundglass opacities most pronounced in the posterior segment of the right upper lobe, the right lower lobe, and the basilar left lower lobe. Findings favor a multifocal pneumonia which    could reflect aspiration the proper clinical setting. 2. Main pulmonary artery enlargement, correlate for pulmonary artery hypertension. 3. Mild paratracheal lymphadenopathy based similar to previous exam presumably reactive. 4. Extensive coronary artery calcification. 5. Trace right pleural effusion.          EGD report 1/6/2020:  Duodenum: Normal  Stomach: Normal aside from mild atrophic changes consistent with age. Old food which is brown but no blood or bleeding presen.  Retroflexion did not show a hiatal hernia. Esophagus: GE junction at 40cms.  No Evidence of Ram's or esophagitis. Incidental note of a diverticulum at 35cms. Large amount of tenacious mucus in the posterior oropharynx which was biopsied.   Estimated blood loss none  Plan:  Just dark old food and fluid. No hematin or blood. No potential source of bleeding. Large amount of blood around dressings on hands could cause drop in Hgb. Suspect he gagged on mucus he was coughing up leading to the vomiting. Treat underlying pulmonary process  No need for PPI aside from daily home dose  Call back with questions      Behavior/Cognition/Vision/Hearing:  Behavior/Cognition: Alert; Cooperative  Vision: Within Functional Limits  Hearing: Exceptions to [facility]  Hearing Exceptions: Hard of hearing/hearing concerns    Impressions:  Pt exhibiting mod pharyngeal phase dysphagia. Puree and nectar liquids were consumed with no penetration or aspiration, mild residue noted in pyriforms. Decreased laryngeal elevation and reduced epiglottal closure resulted in silent aspiration of thin liquids via straw, during the swallow.   Intermittent penetration/ aspiration was identified with thin via cup, occurring after the swallow, spilling over from residue in pyriforms, again with no monitoring;Effortful swallow;Oral care;Pharyngeal exercises    Education: Images and recommendations were reviewed with pt following this exam.   Patient Education: pt educated to results of MBS  Patient Education Response: Needs reinforcement    Prognosis  Prognosis for safe diet advancement: fair  Barriers to reach goals: (history of dysphagia and concern for aspiration per MD's)  Duration/Frequency of Treatment  Duration/Frequency of Treatment: 3-5 x/week    Goals:    Pt to be seen 3 -5 x/wk to address the following goals  1- The patient will tolerate instrumental swallowing procedure  1/7: goal met  2-The patient will tolerate recommended diet without observed clinical signs of aspiration  3- The patient [place] will verbalize/demonstrate understanding of dysphagia recommendations  1/7: pt educated to results of MBS, diet recommendations and strategies for safer swallowing and rationale. Explained rationale for diet texture due to residue and thickened liquids due to aspiration. Pt with questionable full comprehension, will need cont education and reinforcement  Cont goal  4- pt will participate in swallowing exercises with mod cues to improve ability to protect airway    Oral Preparation / Oral Phase  Pt demonstrated adequate mastication with solid texture, with mod residue again noted in pyriforms    Pharyngeal Phase  Puree and nectar liquids were consumed with no penetration or aspiration, mild residue noted in pyriforms. Decreased laryngeal elevation and reduced epiglottal closure resulted in silent aspiration of thin liquids via straw, during the swallow. Intermittent penetration/ aspiration was identified with thin via cup, occurring after the swallow, spilling over from residue in pyriforms, again with no cough occurring. Pt with decreased penetration with smaller volume, however question if pt would be able to complete this consistently without cues.   Head turn to right attempted with thin via cup, resulting in increased amount of pooling and residue with aspiration after the swallow. Head turn to left did not result in increased residue, however bolus cont to result in mod-deep penetration that did not clear. Pt not able to produce effective volitional cough to clear. Pt demonstrated adequate mastication with solid texture, with mod residue again noted in pyriforms. Additional trials of nectar liquid cleared most of residue and did not result in penetration or aspiration. Esophageal Phase  Not assessed    Pain   Patient Currently in Pain: No  Pain Level: 0    Therapy Time:   Individual Concurrent Group Co-treatment   Time In 0941         Time Out 1005         Minutes 24               Plan:  Diet recommendations; dysphagia II- minced / moist - Nectar thick liquids  Dc recommendations: follow up is indicated after dc from acute   Parvez Boone M.S./[facility]-SLP #[id number]  Pg.  # [id number]  1/7/2020, 10:08 AM

## 2020-01-07 NOTE — PROGRESS NOTES
Progress Note    Admit Date: 1/6/2020  Diet: DIET DYSPHAGIA MINCED AND MOIST; Mildly Thick (Nectar)    CC: Hematemesis    Interval history: Pt seen at bedside today. Mental status much improved. He reports continuing nonproductive cough, but denies any dyspnea or chest pain. EGD yesterday was negative and CT showed multifocal PNA, but no acute bleeding. MBS this morning showed laryngeal aspiration on thin liquids with cup and straw. Pt denies any fever, chills, nausea, vomiting, diarrhea, constipation, or melena/hematochezia. Medications:     Scheduled Meds:   atorvastatin  80 mg Oral Daily    finasteride  5 mg Oral Daily    tamsulosin  0.4 mg Oral QPM    sodium chloride flush  10 mL Intravenous 2 times per day    piperacillin-tazobactam  3.375 g Intravenous Q8H    pantoprazole  40 mg Oral QAM AC    apixaban  2.5 mg Oral BID     Continuous Infusions:    PRN Meds:sodium chloride flush, magnesium hydroxide, ondansetron, acetaminophen    Objective:   Vitals:   T-max:  Patient Vitals for the past 8 hrs:   BP Temp Temp src Pulse Resp SpO2 Weight   01/07/20 0802 123/77 95.7 °F (35.4 °C) Axillary 85 18 100 % --   01/07/20 0645 -- -- -- -- -- -- 159 lb 9.8 oz (72.4 kg)   01/07/20 0318 114/67 96.7 °F (35.9 °C) Oral 80 18 100 % --       Intake/Output Summary (Last 24 hours) at 1/7/2020 1010  Last data filed at 1/7/2020 0655  Gross per 24 hour   Intake 560 ml   Output 350 ml   Net 210 ml       Review of Systems   Constitutional: Negative for chills and fever. HENT: Negative for congestion and rhinorrhea. Respiratory: Positive for cough. Negative for chest tightness, shortness of breath and wheezing. Gastrointestinal: Negative for abdominal pain, blood in stool, diarrhea, nausea and vomiting. Genitourinary: Negative for dysuria and hematuria. Neurological: Negative for dizziness, syncope, light-headedness and headaches. Psychiatric/Behavioral: Negative for confusion.    All other systems reviewed and are

## 2020-01-07 NOTE — CARE COORDINATION
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 ([address]    Moderate aortic insufficiency    Hypertension    Coronary artery disease due to lipid rich plaque    Multi-infarct state    Hypokalemia    Chronic atrial fibrillation    Small bowel obstruction (HCC)    Bilateral lower extremity edema    Elevated TSH    Pharyngoesophageal dysphagia    Dental disease    Hematemesis    GI bleed    Severe sepsis (HCC)    Multifocal pneumonia    Chronic diastolic heart failure [facility])       Inpatient Assessment  Care Transitions Summary    Care Transitions Inpatient Review  Medication Review  Do you have all of your prescriptions and are they filled?:  Yes   Barriers to Medication Adherence:  None  Are you able to afford your medications?:  Yes  How often do you have difficulty taking your medications?:  I always take them as prescribed.   Housing Review  Who do you live with?:  Partner/Spouse/SO  Are you an active caregiver in your home?:  No  Social Support  Do you have a ?:  No  Do you have a [facility]?:  No  Durable Medical Equipment  Patient DME:  Gladys cobbe, Shower chair, Other, Commode  Other Patient DME:  Neftaly Fall at [address] to seek help/take action for Emergent/Urgent situations i.e. fire, crime, inclement weather or health crisis.:  Needs Assistance  Ability handle personal hygiene needs (bathing/dressing/grooming):  Needs Assistance  Ability to manage medications:  Dependent  Ability to prepare food:  Dependent  Ability to maintain home (clean home, laundry):  Dependent  Ability to drive and/or has transportation:  Dependent  Ability to do shopping:  Dependent  Ability to manage finances:  Dependent  Is patient able to live independently?:  No  Hearing and Vision  Visual Impairment:  Visual impairment (Glasses/contacts)  Hearing Impairment:  None  Care Transitions Interventions  No Identified Needs                               Summary  CTN spoke with patient this afternoon for initial face to face interview. Patient states he lives in 1 level home with spouse, home has stairs, but he has a ramp with side rails x's 2. Reports having a cane and walker in home for ambulation, uses walker more. Patient's spouse manages medications, finances, shopping and transportation, as well as homemaking. Patient states he has been to [facility] in the past, per CM note, spouse wanting patient to return, if needed for a short rehab stay. CTN explained CTN program and ongoing follow up once discharged, patient is agreeable at this time. CTN also spoke with floor SW, confirmed that plan is for patient to discharge to [facility]. Follow Up  Future Appointments   Date Time Provider Jasmin Hubbard   3/16/2020  9:30 AM Emery Ojeda MD [facility]       Health Maintenance  There are no preventive care reminders to display for this patient.     Crow Hernández RN

## 2020-01-07 NOTE — PROGRESS NOTES
Urology Attending Progress Note      Subjective:   Patient had a allan placed by Dr. Deyvi Song yesterday. There are no issues with the urinary catheter at this time; it continues to drain clear yellow urine. Vitals:  /77   Pulse 85   Temp 95.7 °F (35.4 °C) (Axillary)   Resp 18   Ht 5' 9\" (1.753 m)   Wt 159 lb 9.8 oz (72.4 kg)   SpO2 100%   BMI 23.57 kg/m²   Temp  Av.5 °F (36.9 °C)  Min: 95.7 °F (35.4 °C)  Max: 99.8 °F (37.7 °C)    Intake/Output Summary (Last 24 hours) at 2020 1100  Last data filed at 2020 1018  Gross per 24 hour   Intake 920 ml   Output 350 ml   Net 570 ml       Exam:   Physical:  · Well developed, well nourished in no acute distress  · Mood indicates no abnormalities. Pt doesnt appear depressed  · Orientated to time and place  · Neck is supple, trachea is midline  · Respiratory effort is normal, breathing 3L via nasal cannula  · Heart rate is regular, rhythm is regular   · Abdomen no masses or hernias are palpated, there is no tenderness.       Male :  · Penis with continued tight phimosis with allan in place  · Scrotum appears normal and both testicles appear normal in size and location    Labs:  WBC:    Lab Results   Component Value Date    WBC 10.6 2020     Hemoglobin/Hematocrit:    Lab Results   Component Value Date    HGB 11.6 2020    HCT 35.8 2020     BMP:    Lab Results   Component Value Date     2020    K 3.7 2020     2020    CO2 22 2020    BUN 24 2020    LABALBU 3.3 2020    CREATININE 1.2 2020    CALCIUM 7.9 2020    GFRAA >60 2020    LABGLOM 57 2020     PT/INR:    Lab Results   Component Value Date    PROTIME 15.1 2017    INR 1.34 2017     PTT:    Lab Results   Component Value Date    APTT 26.9 2017   [APTT    Impression/Plan:   Jailyn Anguiano is a 80year old male with severe phimosis.  Urology was consulted secondary to inability of staff to insert allan catheter yesterday. The allan placed by Dr. Yolie Urbina remains in place and is functioning well.     - Continue allan catheter for now, will plan for voiding trial prior to eventual discharge  - Continue home finasteride and flomax    Moise Beverly MD

## 2020-01-07 NOTE — PROGRESS NOTES
bedside. Pt pleasant and agreeable for PT. \"I am pretty weak today. \"  Pain Screening  Patient Currently in Pain: Denies    Orientation  Disoriented to situation;Disoriented to time;Oriented to person;Oriented to place    Social/Functional History  Lives With: Spouse  Type of Home: House  Home Layout: One level  Home Access: Ramped entrance  Bathroom Shower/Tub: (Pt sponge bathes at sink)  Bathroom Toilet: Standard(3-in-1 over toilet)  Bathroom Equipment: Grab bars around toilet, 3-in-1 commode, Grab bars in shower  Home Equipment: Rolling walker, Lift chair(transport chair)  ADL Assistance: Needs assistance(spouse bathes pt with bath wipes while pt stands at the sink, assist for dressing, indep with toileting)  Homemaking Assistance: Needs assistance(spouse does homemaking)  Ambulation Assistance: Independent(uses rolling walker inside house for short distance, takes transport chair outside of home)  Transfer Assistance: Independent  Active : No(wife drives)  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 Pt reports only leaves the house about once a month for a doctor's appointment. Objective  Strength  Strength RLE: Exception  R Hip Flexion: 3+/5  R Knee Extension: 4-/5  R Ankle Dorsiflexion: 4-/5  Strength LLE: WFL(grossly >4/5 throughout LE)    Bed mobility  Supine to Sit: Minimal assistance(HOB raised, increased effort for upper body, pulling from therapist's hand)     Transfers  Sit to Stand:  Moderate Assistance(from EOB to walker 3x total, pt pulling up from walker despite cues for hand placement)  Stand to sit: Moderate Assistance(decreased control to sit)  Bed to Chair: 2 Person Assistance(Mod A x2, stand step pivot with walker, pt prematurally sitting before aligning self with chair, R knee buckling)     Ambulation  Device: Rolling Walker  Assistance: 2 Person assistance(Mod A x2)  Quality of Gait: flexed posture, R knee near buckling, assist for walker management, pt prematurally sitting in chair before aligning self  Gait Deviations: Decreased step length;Decreased step height  Distance: 3-4 steps to chair    Balance  Sitting - Static: Fair  Sitting - Dynamic: Fair  Standing - Static: Poor(Min A with UE support of rolling walker, cues for posture)  Standing - Dynamic: Poor(Mod A x2 for transfer/gait)    Treatment included gait and transfer training with patient education     Plan   Times per week: 2-5  Current Treatment Recommendations: Functional Mobility Training, Transfer Training, Gait Training, Strengthening    Safety Devices  Type of devices: Left in chair, Chair alarm in place, Call light within reach, Nurse notified(LEs elevated, wife in room at end of session)      AM-PAC Score  AM-PAC Inpatient Mobility Raw Score : 12 (01/07/20 1324)  AM-PAC Inpatient T-Scale Score : 35.33 (01/07/20 1324)  Mobility Inpatient CMS 0-100% Score: 68.66 (01/07/20 1324)  Mobility Inpatient CMS G-Code Modifier : CL (01/07/20 1324)    Goals  Short term goals  Time Frame for Short term goals: Discharge  Short term goal 1: bed mobility SBA  Short term goal 2: sit <> stand CGA  Short term goal 3: bed <> chair min A   Short term goal 4: ambulate 30ft with rolling walker min A   Patient Goals   Patient goals : Go home       Therapy Time   Individual Concurrent Group Co-treatment   Time In 1030         Time Out 1124         Minutes 54         Timed Code Treatment Minutes:  40  Total Treatment Minutes:  ANGIE Farris

## 2020-01-07 NOTE — CARE COORDINATION
Case Management Assessment           Daily Note                 Date/ Time of Note: 1/7/2020 6:05 PM         Note completed by: Cyndy Marquez Day    Patient Name: Luciana Saldivar  YOB: 1927    Diagnosis:Coffee ground emesis [K92.0]  Coffee ground emesis [K92.0]  GI bleed [K92.2]  Patient Admission Status: Inpatient    Date of Admission:1/6/2020  4:32 AM Length of Stay: 1 GLOS:        Current Plan of Care: Plan for possible D/C tomorrow. [facility] pre-cert. Can obtain same day.   ________________________________________________________________________________________  PT AM-PAC: 12 / 24 per last evaluation on: 1/7    OT AM-PAC: 15 / 24 per last evaluation on: 1/7    DME Needs for discharge: defer  ________________________________________________________________________________________  Discharge Plan: SNF: [facility] [address], [id number]  Report: [phone]  Fax: [phone]     Tentative discharge date: 1/8 vs 1/9     Current barriers to discharge: Pending Medical Clearance and same day [facility] pre-cert. Referrals completed: SNF: [facility]     Resources/ information provided: [facility] List  ________________________________________________________________________________________  Case Management Notes: SW rounded on this date. Plan remains for SNF at [facility] when medically ready. Therapy evaluated patient today. [facility] pre-cert can be started in the AM for possible same day discharge pending medical clearance. SW to follow up with Dr. Allison Rob and medical team in AM.       Vanita Hernandez and his family were provided with choice of provider; he and his family are in agreement with the discharge plan.     Care Transition Patient: CARLOS Mitchell  [facility]  Case Management Department  Ph: [phone]  Fax: [phone]

## 2020-01-07 NOTE — DISCHARGE INSTR - COC
signature: Electronically signed by CARLOS Grace on 1/7/20 at 6:07 PM    PHYSICIAN SECTION    Prognosis: Fair    Condition at Discharge: Stable    Rehab Potential (if transferring to Rehab): Fair    Recommended Labs or Other Treatments After Discharge: bmp cbc in a week. Continue PT/OT    Physician Certification: I certify the above information and transfer of Gaetano Domínguez  is necessary for the continuing treatment of the diagnosis listed and that he requires [facility] for greater 30 days. Update Admission H&P: Changes in H&P as follows - Patient initially thought to be having upper GI bleeding. EGD showed no bleeding. Patient was found to be septic from pneumonia and was treated with fluid and antibiotics.     PHYSICIAN SIGNATURE:  Electronically signed by Millie Vaughn MD on 1/9/20 at 8:59 AM

## 2020-01-08 LAB
ANION GAP SERPL CALCULATED.3IONS-SCNC: 13 MMOL/L (ref 3–16)
BASOPHILS ABSOLUTE: 0 K/UL (ref 0–0.2)
BASOPHILS RELATIVE PERCENT: 0.2 %
BUN BLDV-MCNC: 24 MG/DL (ref 7–20)
CALCIUM SERPL-MCNC: 8.1 MG/DL (ref 8.3–10.6)
CHLORIDE BLD-SCNC: 108 MMOL/L (ref 99–110)
CO2: 21 MMOL/L (ref 21–32)
CREAT SERPL-MCNC: 0.9 MG/DL (ref 0.8–1.3)
EOSINOPHILS ABSOLUTE: 0.4 K/UL (ref 0–0.6)
EOSINOPHILS RELATIVE PERCENT: 4.7 %
GFR AFRICAN AMERICAN: >60
GFR NON-AFRICAN AMERICAN: >60
GLUCOSE BLD-MCNC: 101 MG/DL (ref 70–99)
HCT VFR BLD CALC: 32 % (ref 40.5–52.5)
HEMOGLOBIN: 10.7 G/DL (ref 13.5–17.5)
LYMPHOCYTES ABSOLUTE: 0.6 K/UL (ref 1–5.1)
LYMPHOCYTES RELATIVE PERCENT: 6.3 %
MAGNESIUM: 1.7 MG/DL (ref 1.8–2.4)
MCH RBC QN AUTO: 30.2 PG (ref 26–34)
MCHC RBC AUTO-ENTMCNC: 33.6 G/DL (ref 31–36)
MCV RBC AUTO: 89.9 FL (ref 80–100)
MISCELLANEOUS LAB TEST ORDER: ABNORMAL
MONOCYTES ABSOLUTE: 0.9 K/UL (ref 0–1.3)
MONOCYTES RELATIVE PERCENT: 10.1 %
NEUTROPHILS ABSOLUTE: 7 K/UL (ref 1.7–7.7)
NEUTROPHILS RELATIVE PERCENT: 78.7 %
PDW BLD-RTO: 16 % (ref 12.4–15.4)
PLATELET # BLD: 153 K/UL (ref 135–450)
PMV BLD AUTO: 8.6 FL (ref 5–10.5)
POTASSIUM REFLEX MAGNESIUM: 3 MMOL/L (ref 3.5–5.1)
RBC # BLD: 3.56 M/UL (ref 4.2–5.9)
SODIUM BLD-SCNC: 142 MMOL/L (ref 136–145)
WBC # BLD: 8.9 K/UL (ref 4–11)

## 2020-01-08 PROCEDURE — 6370000000 HC RX 637 (ALT 250 FOR IP): Performed by: STUDENT IN AN ORGANIZED HEALTH CARE EDUCATION/TRAINING PROGRAM

## 2020-01-08 PROCEDURE — 85025 COMPLETE CBC W/AUTO DIFF WBC: CPT

## 2020-01-08 PROCEDURE — 1200000000 HC SEMI PRIVATE

## 2020-01-08 PROCEDURE — 2580000003 HC RX 258: Performed by: INTERNAL MEDICINE

## 2020-01-08 PROCEDURE — 99232 SBSQ HOSP IP/OBS MODERATE 35: CPT | Performed by: HOSPITALIST

## 2020-01-08 PROCEDURE — 83735 ASSAY OF MAGNESIUM: CPT

## 2020-01-08 PROCEDURE — 80048 BASIC METABOLIC PNL TOTAL CA: CPT

## 2020-01-08 PROCEDURE — 36415 COLL VENOUS BLD VENIPUNCTURE: CPT

## 2020-01-08 PROCEDURE — 92526 ORAL FUNCTION THERAPY: CPT

## 2020-01-08 PROCEDURE — 6360000002 HC RX W HCPCS: Performed by: INTERNAL MEDICINE

## 2020-01-08 PROCEDURE — 6360000002 HC RX W HCPCS: Performed by: STUDENT IN AN ORGANIZED HEALTH CARE EDUCATION/TRAINING PROGRAM

## 2020-01-08 PROCEDURE — 6370000000 HC RX 637 (ALT 250 FOR IP): Performed by: INTERNAL MEDICINE

## 2020-01-08 RX ORDER — MAGNESIUM SULFATE IN WATER 40 MG/ML
2 INJECTION, SOLUTION INTRAVENOUS ONCE
Status: COMPLETED | OUTPATIENT
Start: 2020-01-08 | End: 2020-01-08

## 2020-01-08 RX ORDER — POTASSIUM CHLORIDE 20 MEQ/1
40 TABLET, EXTENDED RELEASE ORAL 2 TIMES DAILY WITH MEALS
Status: COMPLETED | OUTPATIENT
Start: 2020-01-08 | End: 2020-01-08

## 2020-01-08 RX ADMIN — PANTOPRAZOLE SODIUM 40 MG: 40 TABLET, DELAYED RELEASE ORAL at 05:31

## 2020-01-08 RX ADMIN — ATORVASTATIN CALCIUM 80 MG: 80 TABLET, FILM COATED ORAL at 20:23

## 2020-01-08 RX ADMIN — Medication 10 ML: at 09:59

## 2020-01-08 RX ADMIN — APIXABAN 2.5 MG: 2.5 TABLET, FILM COATED ORAL at 09:59

## 2020-01-08 RX ADMIN — APIXABAN 2.5 MG: 2.5 TABLET, FILM COATED ORAL at 20:23

## 2020-01-08 RX ADMIN — FINASTERIDE 5 MG: 5 TABLET, FILM COATED ORAL at 09:59

## 2020-01-08 RX ADMIN — PIPERACILLIN AND TAZOBACTAM 3.38 G: 3; .375 INJECTION, POWDER, LYOPHILIZED, FOR SOLUTION INTRAVENOUS at 13:10

## 2020-01-08 RX ADMIN — TAMSULOSIN HYDROCHLORIDE 0.4 MG: 0.4 CAPSULE ORAL at 17:00

## 2020-01-08 RX ADMIN — POTASSIUM CHLORIDE 40 MEQ: 20 TABLET, EXTENDED RELEASE ORAL at 09:58

## 2020-01-08 RX ADMIN — Medication 10 ML: at 20:23

## 2020-01-08 RX ADMIN — PIPERACILLIN AND TAZOBACTAM 3.38 G: 3; .375 INJECTION, POWDER, LYOPHILIZED, FOR SOLUTION INTRAVENOUS at 02:56

## 2020-01-08 RX ADMIN — PIPERACILLIN AND TAZOBACTAM 3.38 G: 3; .375 INJECTION, POWDER, LYOPHILIZED, FOR SOLUTION INTRAVENOUS at 18:57

## 2020-01-08 RX ADMIN — POTASSIUM CHLORIDE 40 MEQ: 20 TABLET, EXTENDED RELEASE ORAL at 17:00

## 2020-01-08 RX ADMIN — MAGNESIUM SULFATE HEPTAHYDRATE 2 G: 40 INJECTION, SOLUTION INTRAVENOUS at 10:00

## 2020-01-08 ASSESSMENT — PAIN SCALES - GENERAL
PAINLEVEL_OUTOF10: 0

## 2020-01-08 ASSESSMENT — ENCOUNTER SYMPTOMS
COUGH: 1
DIARRHEA: 0
NAUSEA: 0
WHEEZING: 0
VOMITING: 0
RHINORRHEA: 0
CHEST TIGHTNESS: 0
SHORTNESS OF BREATH: 0
ABDOMINAL PAIN: 0
BLOOD IN STOOL: 0

## 2020-01-08 NOTE — PROGRESS NOTES
1/6/2020:  Duodenum: Normal  Stomach: Normal aside from mild atrophic changes consistent with age. Old food which is brown but no blood or bleeding presen.  Retroflexion did not show a hiatal hernia. Esophagus: GE junction at 40cms.  No Evidence of Ram's or esophagitis. Incidental note of a diverticulum at 35cms. Large amount of tenacious mucus in the posterior oropharynx which was biopsied.   Estimated blood loss none  Plan:  Just dark old food and fluid. No hematin or blood. No potential source of bleeding. Large amount of blood around dressings on hands could cause drop in Hgb. Suspect he gagged on mucus he was coughing up leading to the vomiting. Treat underlying pulmonary process  No need for PPI aside from daily home dose  Call back with questions        Chart reviewed. Medical Diagnosis: coffee ground emesis  Treatment Diagnosis: dysphagia    BSE Impression 1/7/2020   Pt alert, oriented to all but year, followed simple commands and conversed appropriately. Pt has been seen several times by SLP in 2017, where pt was at risk for aspiration. Pt does not recall this or prior recommendations for diet. Pt presented with ice chips, water via cup and straw, applesauce and ellen cracker. pt exhibited no overt signs of aspiration, voice clear, good swallow movement noted upon palpation of anterior neck. Pt with several missing teeth, states he eats soft foods. PT demonstrated effective mastication with soft solid. Pt has endoscopy 1/6, suspicion for aspiration per MD's. Due to this and history of dysphagia, will proceed with MBS to fully analyze swallow function    MBS results 1/7/2020  Pt exhibiting mod pharyngeal phase dysphagia. Puree and nectar liquids were consumed with no penetration or aspiration, mild residue noted in pyriforms. Decreased laryngeal elevation and reduced epiglottal closure resulted in silent aspiration of thin liquids via straw, during the swallow.   Intermittent

## 2020-01-08 NOTE — PROGRESS NOTES
Progress Note    Admit Date: 1/6/2020  Diet: DIET DYSPHAGIA MINCED AND MOIST; Mildly Thick (Nectar)    CC: Hematemesis    Interval history: Pt seen at bedside today. He remains A&Ox3 and states his cough is improved. He denies any fever, chills, chest pain/tightness, or dyspnea and expresses desire for discharge. Discussed with him need to remain for further treatment with IV abx. Medications:     Scheduled Meds:   magnesium sulfate  2 g Intravenous Once    potassium chloride  40 mEq Oral BID WC    atorvastatin  80 mg Oral Daily    finasteride  5 mg Oral Daily    tamsulosin  0.4 mg Oral QPM    sodium chloride flush  10 mL Intravenous 2 times per day    piperacillin-tazobactam  3.375 g Intravenous Q8H    pantoprazole  40 mg Oral QAM AC    apixaban  2.5 mg Oral BID     Continuous Infusions:    PRN Meds:sodium chloride flush, magnesium hydroxide, ondansetron, acetaminophen    Objective:   Vitals:   T-max:  Patient Vitals for the past 8 hrs:   BP Temp Temp src Pulse Resp SpO2 Weight   01/08/20 0950 123/77 97.2 °F (36.2 °C) Oral 78 18 95 % --   01/08/20 0638 -- -- -- -- -- -- 158 lb 12.8 oz (72 kg)   01/08/20 0404 112/68 97.9 °F (36.6 °C) Oral 87 18 95 % --       Intake/Output Summary (Last 24 hours) at 1/8/2020 1105  Last data filed at 1/8/2020 0928  Gross per 24 hour   Intake 720 ml   Output 900 ml   Net -180 ml       Review of Systems   Constitutional: Negative for chills and fever. HENT: Negative for congestion and rhinorrhea. Respiratory: Positive for cough. Negative for chest tightness, shortness of breath and wheezing. Gastrointestinal: Negative for abdominal pain, blood in stool, diarrhea, nausea and vomiting. Genitourinary: Negative for dysuria and hematuria. Neurological: Negative for dizziness, syncope, light-headedness and headaches. Psychiatric/Behavioral: Negative for confusion. All other systems reviewed and are negative.       Physical Exam  Vitals signs and nursing note laryngeal aspiration on thin liquids    Hypotension - improved with Fluid  - D/c IVF    Anemia - some of this is dilutional. Could be from hand bleeding and hemoptysis. - EGD without evidence of bleeding  - daily CBC    Afib on eliquis  - Resume eliquis    CHFpEF  - will resume lasix once fluid resuscitated    HLD  - lipitor    Code Status: Limited (no CPR, resuscitation, shock or intubation)  FEN: IVF  PPX: PPI  DISPO: Floor    Ludwig Gallo, MS4  01/08/20  11:05 AM    This patient has been staffed and discussed with Yuly Lee MD.     Addendum to Resident H& P/Progress note:  I have personally seen,examined and evaluated the patient. I have reviewed the current history, physical findings, labs and assessment and plan and agree with note as documented by SOHAIL KELLY.  + Hypokalemia/hypomagnesemia; will replace. Continue physical and Occupational Therapy. Hopefully, will be ready for discharge to skilled nursing facility at [facility] tomorrow.       Yuly Lee MD, FACP

## 2020-01-08 NOTE — PROGRESS NOTES
Urology Attending Progress Note      Subjective: no  complaints     Vitals:  /68   Pulse 87   Temp 97.9 °F (36.6 °C) (Oral)   Resp 18   Ht 5' 9\" (1.753 m)   Wt 158 lb 12.8 oz (72 kg)   SpO2 95%   BMI 23.45 kg/m²   Temp  Av.4 °F (36.3 °C)  Min: 97 °F (36.1 °C)  Max: 97.9 °F (36.6 °C)    Intake/Output Summary (Last 24 hours) at 2020 0947  Last data filed at 2020 0928  Gross per 24 hour   Intake 1080 ml   Output 900 ml   Net 180 ml       Exam: abd soft and non-tender. Allan draining clear urine     Labs:  WBC:    Lab Results   Component Value Date    WBC 8.9 2020     Hemoglobin/Hematocrit:    Lab Results   Component Value Date    HGB 10.7 2020    HCT 32.0 2020     BMP:    Lab Results   Component Value Date     2020    K 3.0 2020     2020    CO2 21 2020    BUN 24 2020    LABALBU 3.3 2020    CREATININE 0.9 2020    CALCIUM 8.1 2020    GFRAA >60 2020    LABGLOM >60 2020     PT/INR:    Lab Results   Component Value Date    PROTIME 15.1 2017    INR 1.34 2017     PTT:    Lab Results   Component Value Date    APTT 26.9 2017   [APTT    Urinalysis: unremarkable     Urine Culture: NGTD    Blood Culture:  NGTD    Antibiotic Therapy:  Zosyn     Imaging: no new       Impression/Plan: Shilpa Ochoa is a 80year old male with severe phimosis. Urology was consulted secondary to inability of staff to insert allan catheter yesterday.  The allan placed by Dr. Kaylen Choi remains in place and is functioning well.     - Continue allan catheter for now, will plan for voiding trial prior to discharge  - Continue home finasteride and flomax    Larey Sites, APRN - CNP

## 2020-01-09 VITALS
TEMPERATURE: 98 F | SYSTOLIC BLOOD PRESSURE: 126 MMHG | HEART RATE: 85 BPM | DIASTOLIC BLOOD PRESSURE: 84 MMHG | RESPIRATION RATE: 20 BRPM | OXYGEN SATURATION: 97 % | HEIGHT: 69 IN | BODY MASS INDEX: 23.37 KG/M2 | WEIGHT: 157.8 LBS

## 2020-01-09 LAB
ANION GAP SERPL CALCULATED.3IONS-SCNC: 11 MMOL/L (ref 3–16)
BASOPHILS ABSOLUTE: 0 K/UL (ref 0–0.2)
BASOPHILS RELATIVE PERCENT: 0.4 %
BUN BLDV-MCNC: 19 MG/DL (ref 7–20)
CALCIUM SERPL-MCNC: 8.2 MG/DL (ref 8.3–10.6)
CHLORIDE BLD-SCNC: 111 MMOL/L (ref 99–110)
CO2: 22 MMOL/L (ref 21–32)
CREAT SERPL-MCNC: 0.8 MG/DL (ref 0.8–1.3)
EOSINOPHILS ABSOLUTE: 0.5 K/UL (ref 0–0.6)
EOSINOPHILS RELATIVE PERCENT: 5.7 %
GFR AFRICAN AMERICAN: >60
GFR NON-AFRICAN AMERICAN: >60
GLUCOSE BLD-MCNC: 99 MG/DL (ref 70–99)
HCT VFR BLD CALC: 34.6 % (ref 40.5–52.5)
HEMOGLOBIN: 11.4 G/DL (ref 13.5–17.5)
LYMPHOCYTES ABSOLUTE: 0.8 K/UL (ref 1–5.1)
LYMPHOCYTES RELATIVE PERCENT: 9.9 %
MCH RBC QN AUTO: 30.2 PG (ref 26–34)
MCHC RBC AUTO-ENTMCNC: 33 G/DL (ref 31–36)
MCV RBC AUTO: 91.5 FL (ref 80–100)
MONOCYTES ABSOLUTE: 0.9 K/UL (ref 0–1.3)
MONOCYTES RELATIVE PERCENT: 11.1 %
NEUTROPHILS ABSOLUTE: 5.8 K/UL (ref 1.7–7.7)
NEUTROPHILS RELATIVE PERCENT: 72.9 %
PDW BLD-RTO: 15.9 % (ref 12.4–15.4)
PLATELET # BLD: 161 K/UL (ref 135–450)
PMV BLD AUTO: 8.7 FL (ref 5–10.5)
POTASSIUM REFLEX MAGNESIUM: 3.7 MMOL/L (ref 3.5–5.1)
RBC # BLD: 3.78 M/UL (ref 4.2–5.9)
SODIUM BLD-SCNC: 144 MMOL/L (ref 136–145)
WBC # BLD: 8 K/UL (ref 4–11)

## 2020-01-09 PROCEDURE — 85025 COMPLETE CBC W/AUTO DIFF WBC: CPT

## 2020-01-09 PROCEDURE — 6360000002 HC RX W HCPCS: Performed by: INTERNAL MEDICINE

## 2020-01-09 PROCEDURE — 6370000000 HC RX 637 (ALT 250 FOR IP): Performed by: INTERNAL MEDICINE

## 2020-01-09 PROCEDURE — 2580000003 HC RX 258: Performed by: INTERNAL MEDICINE

## 2020-01-09 PROCEDURE — 6360000002 HC RX W HCPCS: Performed by: HOSPITALIST

## 2020-01-09 PROCEDURE — 6370000000 HC RX 637 (ALT 250 FOR IP): Performed by: STUDENT IN AN ORGANIZED HEALTH CARE EDUCATION/TRAINING PROGRAM

## 2020-01-09 PROCEDURE — 36415 COLL VENOUS BLD VENIPUNCTURE: CPT

## 2020-01-09 PROCEDURE — 80048 BASIC METABOLIC PNL TOTAL CA: CPT

## 2020-01-09 PROCEDURE — 99238 HOSP IP/OBS DSCHRG MGMT 30/<: CPT | Performed by: HOSPITALIST

## 2020-01-09 RX ORDER — ACETAMINOPHEN 325 MG/1
650 TABLET ORAL EVERY 6 HOURS PRN
Qty: 120 TABLET | Refills: 0 | Status: SHIPPED | OUTPATIENT
Start: 2020-01-09

## 2020-01-09 RX ORDER — AMOXICILLIN AND CLAVULANATE POTASSIUM 875; 125 MG/1; MG/1
1 TABLET, FILM COATED ORAL 2 TIMES DAILY
Qty: 10 TABLET | Refills: 0 | Status: SHIPPED | OUTPATIENT
Start: 2020-01-09 | End: 2020-01-14

## 2020-01-09 RX ORDER — POTASSIUM CHLORIDE 20 MEQ/1
40 TABLET, EXTENDED RELEASE ORAL 2 TIMES DAILY WITH MEALS
Status: DISCONTINUED | OUTPATIENT
Start: 2020-01-09 | End: 2020-01-09 | Stop reason: HOSPADM

## 2020-01-09 RX ORDER — POTASSIUM CHLORIDE 7.45 MG/ML
10 INJECTION INTRAVENOUS
Status: ACTIVE | OUTPATIENT
Start: 2020-01-09 | End: 2020-01-09

## 2020-01-09 RX ORDER — POTASSIUM CHLORIDE 7.45 MG/ML
10 INJECTION INTRAVENOUS
Status: DISPENSED | OUTPATIENT
Start: 2020-01-09 | End: 2020-01-09

## 2020-01-09 RX ORDER — MAGNESIUM SULFATE IN WATER 40 MG/ML
2 INJECTION, SOLUTION INTRAVENOUS ONCE
Status: DISCONTINUED | OUTPATIENT
Start: 2020-01-09 | End: 2020-01-09 | Stop reason: HOSPADM

## 2020-01-09 RX ADMIN — POTASSIUM CHLORIDE 10 MEQ: 7.46 INJECTION, SOLUTION INTRAVENOUS at 11:04

## 2020-01-09 RX ADMIN — PIPERACILLIN AND TAZOBACTAM 3.38 G: 3; .375 INJECTION, POWDER, LYOPHILIZED, FOR SOLUTION INTRAVENOUS at 03:36

## 2020-01-09 RX ADMIN — APIXABAN 2.5 MG: 2.5 TABLET, FILM COATED ORAL at 10:47

## 2020-01-09 RX ADMIN — PANTOPRAZOLE SODIUM 40 MG: 40 TABLET, DELAYED RELEASE ORAL at 05:06

## 2020-01-09 RX ADMIN — FINASTERIDE 5 MG: 5 TABLET, FILM COATED ORAL at 10:47

## 2020-01-09 RX ADMIN — POTASSIUM CHLORIDE 40 MEQ: 20 TABLET, EXTENDED RELEASE ORAL at 10:47

## 2020-01-09 ASSESSMENT — PAIN SCALES - GENERAL
PAINLEVEL_OUTOF10: 0
PAINLEVEL_OUTOF10: 0

## 2020-01-09 NOTE — CARE COORDINATION
pharmacy can deliver to the bedside if staff is available. (payment due at time of pick-up or delivery - cash, check, or card accepted)     Able to afford home medications/ co-pay costs: No    ADLS:  Current PT AM-PAC Score: 12 /24  Current OT AM-PAC Score: 15 /24      Discharge Disposition: [place] (SNF):   [facility]  [address], [address]  Report: [phone]  Fax: [phone]      LOC at discharge: Skilled  KAUSHIK Completed: Yes    Notification completed in HENS/PAS?:  Yes : CM has completed HENS online through secure website for SNF admission at [facility]. Document ID #: [id number]      IMM Completed:   Yes, Case management has presented and reviewed IMM letter #2 to the patient and/or family/ POA. Patient and/or family/POA verbalized understanding of their medicare rights and appeal process if needed. Patient and/or family/POA has signed, initialed and placed today's date (1/9) and time (see chart) on IMM letter #2 on the the appropriate lines. Patient and/or family/POA, copy of letter offered and they are aware that this original copy of IMM letter #2 is available prior to discharge from the paper chart on the unit. Electronic documentation has been entered into epic for IMM letter #2 and original paper copy has been added to the paper chart at the nurses station. Transportation:  Transportation Plan for discharge: EMS transportation   Mode of Transport: Ambulance stretcher - BLS    Reason for medical transport: Other: HX of TBI, PNA, Sepsis, Contact isolation, PNA, A&Ox2, 2 person assist, High Fall risk  Name of [address]: [address]  Phone: [phone]  Transport Time: 1:00 pm      Transportation form completed: Yes    Dialysis:  Dialysis patient: No    Referrals made at [facility] for outpatient continued care:  Not Applicable    Additional CM Notes:   SW confirmed with team for discharge. Orders Placed, HENS Completed and family notified.

## 2020-01-09 NOTE — PLAN OF CARE
Problem: Falls - Risk of:  Goal: Will remain free from falls  Description  Will remain free from falls  Outcome: Ongoing  Note:   Pt is at risk for falls. Fall precautions in place. Call light in reach, bed alarm is on, bed locked and in the lowest position. Will continue to monitor. Problem: Risk for Impaired Skin Integrity  Goal: Tissue integrity - skin and mucous membranes  Description  Structural intactness and normal physiological function of skin and  mucous membranes. Outcome: Ongoing  Note:   Pt at risk for skin breakdown. Skin assessed this shift. No new breakdown noted. Pressure ulcer precautions in place. Specialty mattress in use for patient. Patient turned and repositioned q 2 hours. Will continue to monitor.

## 2020-01-09 NOTE — DISCHARGE SUMMARY
INTERNAL MEDICINE DEPARTMENT AT [address] [place] [address]  Discharge Summary At the [facility]    Patient ID: Urszula Torres                                             Discharge Date: 1/9/2020   Patient's PCP: Jose Palmer MD                                          Discharge Physician: Belén Henley MD  Admit Date: 1/6/2020   Admitting Physician: Belén Henley MD    PROBLEMS DURING HOSPITALIZATION:  Severe sepsis 2/2 multifocal PNA  ( possibly aspiration in nature)  Hypotension  Anemia  Atrial fibrillation  CHFpEF  HLD    DISCHARGE DIAGNOSES:  Severe sepsis 2/2 multifocal PNA  ( possibly aspiration in nature)    Hospital Course:    Pt is a 81yo male with PMHx of HTN, AF, PUD, GERD, pharyngoesophageal dysphagia, upper esophageal narrowing s/p dilatation and HFpEF who initially p/w coffee ground emesis. Pt reported persistent nonproductive cough 1 month prior to admission with one episode of emesis that may have contained blood. He called EMS and then had a large episode of coffee ground emesis. In ED vital signs and labs were unremarkable with Hgb 13.5. CXR did not reveal consolidation. He was admitted for further evaluation and several hours after arrival on floor had an episode of hypotension into 70s SBP which responded to fluids. Subsequent EGD and CT CAP showed no bleeding; however CT did show multifocal PNA and pt was intermittently febrile, so he was placed on zosyn and treated for sepsis. MBS showed laryngeal aspiration on thin liquids with cup and straw. This along with pt's history make aspiration the likely cause of PNA. Pt's cough gradually improved and he will be discharged to [facility] on 5 days Augmentin. Physical Exam:  /84   Pulse 85   Temp 98 °F (36.7 °C) (Oral)   Resp 20   Ht 5' 9\" (1.753 m)   Wt 157 lb 12.8 oz (71.6 kg)   SpO2 97%   BMI 23.30 kg/m²     Physical Exam  Vitals signs and nursing note reviewed. Constitutional:       General: He is not in acute distress.

## 2020-01-10 LAB
BLOOD CULTURE, ROUTINE: NORMAL
CULTURE, BLOOD 2: NORMAL

## 2020-02-07 ENCOUNTER — TELEPHONE (OUTPATIENT)
Dept: INTERNAL MEDICINE CLINIC | Age: 85
End: 2020-02-07

## 2020-02-07 ENCOUNTER — CARE COORDINATION (OUTPATIENT)
Dept: CASE MANAGEMENT | Age: 85
End: 2020-02-07

## 2020-02-17 ENCOUNTER — TELEPHONE (OUTPATIENT)
Dept: INTERNAL MEDICINE CLINIC | Age: 85
End: 2020-02-17

## 2020-02-25 ENCOUNTER — TELEPHONE (OUTPATIENT)
Dept: INTERNAL MEDICINE CLINIC | Age: 85
End: 2020-02-25

## 2020-02-25 NOTE — TELEPHONE ENCOUNTER
Home care stated pt was suppose to has TSH drawn on the 20th and it did not get done a nurse is going out today to see  Pt can they go ahead get them TSH

## 2020-02-28 ENCOUNTER — TELEPHONE (OUTPATIENT)
Dept: INTERNAL MEDICINE CLINIC | Age: 85
End: 2020-02-28

## 2020-02-28 NOTE — TELEPHONE ENCOUNTER
Long Hands with [employer] called regarding the TSH, she states that they do not have the correct sample, they mary kay the wrong tube. If you need this done, the patient will need to be redrawn. Please call with any questions.

## 2020-03-16 ENCOUNTER — OFFICE VISIT (OUTPATIENT)
Dept: INTERNAL MEDICINE CLINIC | Age: 85
End: 2020-03-16
Payer: MEDICARE

## 2020-03-16 VITALS
SYSTOLIC BLOOD PRESSURE: 110 MMHG | BODY MASS INDEX: 21.41 KG/M2 | HEART RATE: 67 BPM | WEIGHT: 145 LBS | OXYGEN SATURATION: 85 % | DIASTOLIC BLOOD PRESSURE: 80 MMHG

## 2020-03-16 DIAGNOSIS — I10 ESSENTIAL HYPERTENSION: ICD-10-CM

## 2020-03-16 DIAGNOSIS — E53.8 B12 DEFICIENCY: ICD-10-CM

## 2020-03-16 DIAGNOSIS — E03.9 HYPOTHYROIDISM, UNSPECIFIED TYPE: ICD-10-CM

## 2020-03-16 DIAGNOSIS — E78.5 HYPERLIPIDEMIA WITH TARGET LDL LESS THAN 100: ICD-10-CM

## 2020-03-16 PROBLEM — K92.0 HEMATEMESIS: Status: RESOLVED | Noted: 2020-01-06 | Resolved: 2020-03-16

## 2020-03-16 PROBLEM — K92.2 GI BLEED: Status: RESOLVED | Noted: 2020-01-06 | Resolved: 2020-03-16

## 2020-03-16 PROBLEM — K56.609 SMALL BOWEL OBSTRUCTION (HCC): Status: RESOLVED | Noted: 2018-08-14 | Resolved: 2020-03-16

## 2020-03-16 LAB
A/G RATIO: 1.3 (ref 1.1–2.2)
ALBUMIN SERPL-MCNC: 3.1 G/DL (ref 3.4–5)
ALP BLD-CCNC: 160 U/L (ref 40–129)
ALT SERPL-CCNC: 9 U/L (ref 10–40)
ANION GAP SERPL CALCULATED.3IONS-SCNC: 13 MMOL/L (ref 3–16)
AST SERPL-CCNC: 18 U/L (ref 15–37)
BASOPHILS ABSOLUTE: 0.1 K/UL (ref 0–0.2)
BASOPHILS RELATIVE PERCENT: 0.8 %
BILIRUB SERPL-MCNC: 0.7 MG/DL (ref 0–1)
BUN BLDV-MCNC: 10 MG/DL (ref 7–20)
CALCIUM SERPL-MCNC: 9 MG/DL (ref 8.3–10.6)
CHLORIDE BLD-SCNC: 102 MMOL/L (ref 99–110)
CHOLESTEROL, TOTAL: 92 MG/DL (ref 0–199)
CO2: 25 MMOL/L (ref 21–32)
CREAT SERPL-MCNC: 0.8 MG/DL (ref 0.8–1.3)
EOSINOPHILS ABSOLUTE: 0.1 K/UL (ref 0–0.6)
EOSINOPHILS RELATIVE PERCENT: 1.8 %
GFR AFRICAN AMERICAN: >60
GFR NON-AFRICAN AMERICAN: >60
GLOBULIN: 2.4 G/DL
GLUCOSE BLD-MCNC: 139 MG/DL (ref 70–99)
HCT VFR BLD CALC: 36.7 % (ref 40.5–52.5)
HDLC SERPL-MCNC: 43 MG/DL (ref 40–60)
HEMOGLOBIN: 12.4 G/DL (ref 13.5–17.5)
LDL CHOLESTEROL CALCULATED: 37 MG/DL
LYMPHOCYTES ABSOLUTE: 0.7 K/UL (ref 1–5.1)
LYMPHOCYTES RELATIVE PERCENT: 9.3 %
MCH RBC QN AUTO: 30.3 PG (ref 26–34)
MCHC RBC AUTO-ENTMCNC: 33.8 G/DL (ref 31–36)
MCV RBC AUTO: 89.7 FL (ref 80–100)
MONOCYTES ABSOLUTE: 0.6 K/UL (ref 0–1.3)
MONOCYTES RELATIVE PERCENT: 7.7 %
NEUTROPHILS ABSOLUTE: 5.9 K/UL (ref 1.7–7.7)
NEUTROPHILS RELATIVE PERCENT: 80.4 %
PDW BLD-RTO: 16.2 % (ref 12.4–15.4)
PLATELET # BLD: 203 K/UL (ref 135–450)
PMV BLD AUTO: 8.6 FL (ref 5–10.5)
POTASSIUM SERPL-SCNC: 4.4 MMOL/L (ref 3.5–5.1)
RBC # BLD: 4.09 M/UL (ref 4.2–5.9)
SODIUM BLD-SCNC: 140 MMOL/L (ref 136–145)
T4 FREE: 1.3 NG/DL (ref 0.9–1.8)
TOTAL PROTEIN: 5.5 G/DL (ref 6.4–8.2)
TRIGL SERPL-MCNC: 58 MG/DL (ref 0–150)
TSH SERPL DL<=0.05 MIU/L-ACNC: 3.55 UIU/ML (ref 0.27–4.2)
VITAMIN B-12: 1133 PG/ML (ref 211–911)
VLDLC SERPL CALC-MCNC: 12 MG/DL
WBC # BLD: 7.3 K/UL (ref 4–11)

## 2020-03-16 PROCEDURE — G8420 CALC BMI NORM PARAMETERS: HCPCS | Performed by: INTERNAL MEDICINE

## 2020-03-16 PROCEDURE — 1123F ACP DISCUSS/DSCN MKR DOCD: CPT | Performed by: INTERNAL MEDICINE

## 2020-03-16 PROCEDURE — G8427 DOCREV CUR MEDS BY ELIG CLIN: HCPCS | Performed by: INTERNAL MEDICINE

## 2020-03-16 PROCEDURE — G8482 FLU IMMUNIZE ORDER/ADMIN: HCPCS | Performed by: INTERNAL MEDICINE

## 2020-03-16 PROCEDURE — G0439 PPPS, SUBSEQ VISIT: HCPCS | Performed by: INTERNAL MEDICINE

## 2020-03-16 PROCEDURE — 1036F TOBACCO NON-USER: CPT | Performed by: INTERNAL MEDICINE

## 2020-03-16 PROCEDURE — 99213 OFFICE O/P EST LOW 20 MIN: CPT | Performed by: INTERNAL MEDICINE

## 2020-03-16 PROCEDURE — 4040F PNEUMOC VAC/ADMIN/RCVD: CPT | Performed by: INTERNAL MEDICINE

## 2020-03-16 ASSESSMENT — PATIENT HEALTH QUESTIONNAIRE - PHQ9
SUM OF ALL RESPONSES TO PHQ QUESTIONS 1-9: 0
SUM OF ALL RESPONSES TO PHQ QUESTIONS 1-9: 0

## 2020-03-16 ASSESSMENT — LIFESTYLE VARIABLES: HOW OFTEN DO YOU HAVE A DRINK CONTAINING ALCOHOL: 0

## 2020-03-16 NOTE — ASSESSMENT & PLAN NOTE
Routine checkup. Labs today. He has recovered from his recent pneumonia and sepsis. Appetite fair.   Activity level is minimal.

## 2020-03-16 NOTE — PROGRESS NOTES
SUBJECTIVE:  Patient ID: Donna Price is an 80 y.o. male. HPI: Patient here today for the f/u of chronic problems-- see Problem List and associated comments. New issues or complaints include (also see Assessment for more details): Here for general checkup. All things considered he is doing well. He is reliant upon others for his ADLs including medication. He offers no complaints. He was hospitalized with pneumonia a few months ago. He continues to recover well. Review of Systems   Unable to perform ROS: Other       OBJECTIVE:    /80   Pulse 67   Wt 145 lb (65.8 kg)   SpO2 (!) 85%   BMI 21.41 kg/m²      Physical Exam  Constitutional:       General: He is not in acute distress. Appearance: He is well-developed. He is not ill-appearing or diaphoretic. Comments: Wheelchair   HENT:      Right Ear: External ear normal.      Left Ear: External ear normal.   Eyes:      General: No scleral icterus. Extraocular Movements: Extraocular movements intact. Neck:      Vascular: No carotid bruit or JVD. Cardiovascular:      Rate and Rhythm: Normal rate. Rhythm irregular. Pulses:           Carotid pulses are 2+ on the right side and 2+ on the left side. Radial pulses are 2+ on the right side and 2+ on the left side. Heart sounds: No gallop. Pulmonary:      Effort: Pulmonary effort is normal. No respiratory distress. Breath sounds: Normal breath sounds. No stridor. No wheezing or rales. Abdominal:      General: Bowel sounds are normal.      Palpations: Abdomen is soft. Musculoskeletal:      Right lower leg: No edema. Left lower leg: No edema. Skin:     Coloration: Skin is not jaundiced or pale. Neurological:      Mental Status: He is alert and oriented to person, place, and time. Psychiatric:         Attention and Perception: He is attentive. He does not perceive auditory hallucinations. Mood and Affect: Mood is not anxious or depressed. Behavior: Behavior normal.         Thought Content: Thought content normal.         Cognition and Memory: Cognition is impaired. ASSESSMENT:       Encounter Diagnoses   Name Primary?  Routine general medical examination at a health care facility Yes    Essential hypertension     Chronic atrial fibrillation     Hypokalemia     Hyperlipidemia with target LDL less than 100     Hypothyroidism, unspecified type     B12 deficiency     Preventative health care     PUD (peptic ulcer disease)     Chronic diastolic heart failure (HCC)     Elevated TSH     Multi-infarct state        Preventative health care  Routine checkup. Labs today. He has recovered from his recent pneumonia and sepsis. Appetite fair. Activity level is minimal.    PUD (peptic ulcer disease)  No GI complaints. Continue omeprazole. Chronic atrial fibrillation (HCC)  Continue anticoagulation. Rate control. Chronic diastolic heart failure (HCC)  Stable    Elevated TSH  Check TFT    Hypertension  BP okay    Multi-infarct state ([address]  Unchanged-no no signs of any infarct.         PLAN:See ASSESSMENT for evaluation & PLAN     Orders Placed This Encounter   Procedures    CBC Auto Differential     Standing Status:   Future     Number of Occurrences:   1     Standing Expiration Date:   3/16/2021    Comprehensive Metabolic Panel     Standing Status:   Future     Number of Occurrences:   1     Standing Expiration Date:   3/16/2021    Lipid Panel     Standing Status:   Future     Number of Occurrences:   1     Standing Expiration Date:   3/16/2021     Order Specific Question:   Is Patient Fasting?/# of Hours     Answer:   yes - 8 hours    Vitamin B12     Standing Status:   Future     Number of Occurrences:   1     Standing Expiration Date:   3/16/2021    TSH without Reflex     Standing Status:   Future     Number of Occurrences:   1     Standing Expiration Date:   3/16/2021    T4, Free     Standing Status:   Future     Number of

## 2020-03-16 NOTE — PROGRESS NOTES
energy booster (read a bottle of 5 Hour Energy or diet Red Bull). High B12 levels are proven to help prevent dementia & neuropathy. Yes Nicolasa Odell MD   omeprazole (PRILOSEC OTC) 20 MG tablet 1 daily to protect stomach. Yes Nicolasa Odell MD   polyethylene glycol [facility]) powder Take as directed every other day for constipation Yes Nicolasa Odell MD       Past Medical History:   Diagnosis Date    Atrial fibrillation [facility]) 05/2016    Carotid artery disease ([facility] [address]     R>L    GERD (gastroesophageal reflux disease) 2/21/2013    Headaches due to old head injury 2010    Heart murmur, systolic 2/6/8492    1/4/01 Echo at [facility]: Normal left ventricle size and systolic function with an estimated ejection fraction of 55%. Concentric left ventricular hypertrophy is present. No regional wall motion abnormalities are seen. There is reversal of E/A inflow velocities across the mitral valve suggesting impaired left ventricular relaxation. Aortic valve appears sclerotic but opens adequately. Moderate a    Hyperlipidemia LDL goal < 100 2/21/2013    Hypertension     Moderate aortic insufficiency 1/9/2014 1/9/14 Echo at [facility]: Normal left ventricle size and systolic function with an estimated ejection fraction of 55%. Concentric left ventricular hypertrophy is present. No regional wall motion abnormalities are seen. There is reversal of E/A inflow velocities across the mitral valve suggesting impaired left ventricular relaxation. Aortic valve appears sclerotic but opens adequately. 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 Weakness substantially exacerbated after left anterior frontal lobe intraparenchymal bleed 5/4/16 [facility] admission.     SBO (small bowel obstruction) ([address] 08/2018 resolved conservative tx       Past Surgical History:   Procedure Laterality Date    CAPSULOTOMY Bilateral 11/2011    [facility] - Dr. Alisia Ann, YAG capsulotomies for secondary cataracts    CATARACT REMOVAL WITH IMPLANT Left 10/6/2006    with vitrectomy & membrane peel [facility] - Dr Michael Dickerson and Patel Fuentes    COLONOSCOPY  1/11/05    diverticulosis, no polyps Dr. Kathie Coffman. No re-screening needed per Dr. Kathie Coffman.  CORONARY ANGIOPLASTY WITH STENT PLACEMENT  11/2016    [facility]     HERNIA REPAIR      SKIN BIOPSY Right 11/2012    Right ear keloid - Dr. Nikita Pro Right 10/2010    Squamous cell - right ear - Dr. Maria Ines Sanford N/A 1/6/2020    EGD DIAGNOSTIC ONLY performed by Sai Rodriges MD at [facility] Right 6/6/2007    with membrane peel - eye [facility] - Dr Michael Dickerson       No family history on file. CareTeam (Including outside providers/suppliers regularly involved in providing care):   Patient Care Team:  Carol Thacker MD as PCP - [facility]  Carol Thacker MD as PCP - [facility] Empaneled Provider    Wt Readings from Last 3 Encounters:   03/16/20 145 lb (65.8 kg)   01/09/20 157 lb 12.8 oz (71.6 kg)   11/06/18 138 lb (62.6 kg)     Vitals:    03/16/20 0915   Weight: 145 lb (65.8 kg)     Body mass index is 21.41 kg/m². Based upon direct observation of the patient, evaluation of cognition reveals recent and remote memory intact. Patient's complete Health Risk Assessment and screening values have been reviewed and are found in Flowsheets. The following problems were reviewed today and where indicated follow up appointments were made and/or referrals ordered. Positive Risk Factor Screenings with Interventions:     No Positive Risk Factors identified today.     Personalized Preventive Plan   Current Health Maintenance Status  Immunization History   Administered Date(s) Administered    Influenza Vaccine, unspecified formulation 11/01/2016    Influenza Virus Vaccine 10/05/2010, 10/03/2011, 10/01/2012, 10/07/2014    Influenza, High Dose (Fluzone 65 yrs and older) 09/23/2013, 10/04/2017, 09/25/2018    Influenza, Triv, inactivated, subunit, adjuvanted, IM (Fluad 65 yrs and older) 10/14/2019    Pneumococcal Conjugate 13-valent ([id number]) 11/01/2016    Pneumococcal Conjugate 7-valent (Claire Navarro) 04/26/2010    Zoster Live (Zostavax) 11/17/2014        Health Maintenance   Topic Date Due    Annual Wellness Visit (AWV)  05/29/2019    DTaP/Tdap/Td vaccine (1 - Tdap) 04/06/2020 (Originally 9/17/1946)    Shingles Vaccine (2 of 3) 04/14/2020 (Originally 1/12/2015)    Lipid screen  06/05/2020    Potassium monitoring  01/09/2021    Creatinine monitoring  01/09/2021    Flu vaccine  Completed    Pneumococcal 65+ years Vaccine  Completed    Hepatitis A vaccine  Aged Out    Hepatitis B vaccine  Aged Out    Hib vaccine  Aged Out    Meningococcal (ACWY) vaccine  Aged Out     Recommendations for [employer] Due: see orders and patient instructions/AVS.  . Recommended screening schedule for the next 5-10 years is provided to the patient in written form: see Patient Instructions/AVS.    Danielle Palacios was seen today for medicare awv.     Diagnoses and all orders for this visit:    Routine general medical examination at a health care facility

## 2020-03-16 NOTE — PATIENT INSTRUCTIONS
Personalized Preventive Plan for Anaya Cutler - 3/16/2020  Medicare offers a range of preventive health benefits. Some of the tests and screenings are paid in full while other may be subject to a deductible, co-insurance, and/or copay. Some of these benefits include a comprehensive review of your medical history including lifestyle, illnesses that may run in your family, and various assessments and screenings as appropriate. After reviewing your medical record and screening and assessments performed today your provider may have ordered immunizations, labs, imaging, and/or referrals for you. A list of these orders (if applicable) as well as your Preventive Care list are included within your After Visit Summary for your review. Other Preventive Recommendations:    · A preventive eye exam performed by an eye specialist is recommended every 1-2 years to screen for glaucoma; cataracts, macular degeneration, and other eye disorders. · A preventive dental visit is recommended every 6 months. · Try to get at least 150 minutes of exercise per week or 10,000 steps per day on a pedometer . · Order or download the FREE \"Exercise & Physical Activity: Your Everyday Guide\" from [employer]. Call 9-183.372.1724 or search [employer] online. · You need 2259-8892 mg of calcium and 1962-4905 IU of vitamin D per day. It is possible to meet your calcium requirement with diet alone, but a vitamin D supplement is usually necessary to meet this goal.  · When exposed to the sun, use a sunscreen that protects against both UVA and UVB radiation with an SPF of 30 or greater. Reapply every 2 to 3 hours or after sweating, drying off with a towel, or swimming. · Always wear a seat belt when traveling in a car. Always wear a helmet when riding a bicycle or motorcycle.

## 2020-04-02 ENCOUNTER — TELEPHONE (OUTPATIENT)
Dept: INTERNAL MEDICINE CLINIC | Age: 85
End: 2020-04-02

## 2020-04-02 NOTE — TELEPHONE ENCOUNTER
Home Care physical therapy states pt bp was 82/44 when she took it this morning. Physical therapy states she left message for the home care nurse and the nurse stated she will be seeing pt today.  Physical therapy states pt normally runs low and she has him sitting up drinking water

## 2020-04-07 ENCOUNTER — TELEPHONE (OUTPATIENT)
Dept: INTERNAL MEDICINE CLINIC | Age: 85
End: 2020-04-07

## 2020-04-15 ENCOUNTER — TELEPHONE (OUTPATIENT)
Dept: INTERNAL MEDICINE CLINIC | Age: 85
End: 2020-04-15

## 2020-04-15 PROBLEM — Z00.00 PREVENTATIVE HEALTH CARE: Status: RESOLVED | Noted: 2018-06-05 | Resolved: 2020-04-15

## 2020-04-15 NOTE — TELEPHONE ENCOUNTER
Kervin Saunders and will have to call the squad as patient is SOB and cannot even get in a car. Dr Cristal Villalba advised.

## 2020-06-02 RX ORDER — TAMSULOSIN HYDROCHLORIDE 0.4 MG/1
CAPSULE ORAL
Qty: 90 CAPSULE | Refills: 2 | Status: SHIPPED | OUTPATIENT
Start: 2020-06-02 | End: 2021-02-19

## 2020-06-08 RX ORDER — APIXABAN 2.5 MG/1
TABLET, FILM COATED ORAL
Qty: 60 TABLET | Refills: 1 | Status: SHIPPED | OUTPATIENT
Start: 2020-06-08 | End: 2020-09-23 | Stop reason: SDUPTHER

## 2020-07-14 ENCOUNTER — TELEPHONE (OUTPATIENT)
Dept: INTERNAL MEDICINE CLINIC | Age: 85
End: 2020-07-14

## 2020-07-14 ENCOUNTER — APPOINTMENT (OUTPATIENT)
Dept: GENERAL RADIOLOGY | Age: 85
End: 2020-07-14
Payer: MEDICARE

## 2020-07-14 ENCOUNTER — HOSPITAL ENCOUNTER (EMERGENCY)
Age: 85
Discharge: HOME | End: 2020-07-14
Attending: EMERGENCY MEDICINE
Payer: MEDICARE

## 2020-07-14 VITALS
DIASTOLIC BLOOD PRESSURE: 92 MMHG | WEIGHT: 145 LBS | SYSTOLIC BLOOD PRESSURE: 111 MMHG | RESPIRATION RATE: 49 BRPM | HEART RATE: 55 BPM | BODY MASS INDEX: 21.48 KG/M2 | HEIGHT: 69 IN | OXYGEN SATURATION: 95 % | TEMPERATURE: 98.4 F

## 2020-07-14 LAB
ALBUMIN SERPL-MCNC: 3.1 G/DL (ref 3.4–5)
ALP BLD-CCNC: 253 U/L (ref 40–129)
ALT SERPL-CCNC: 10 U/L (ref 10–40)
ANION GAP SERPL CALCULATED.3IONS-SCNC: 11 MMOL/L (ref 3–16)
AST SERPL-CCNC: 22 U/L (ref 15–37)
BASOPHILS ABSOLUTE: 0.1 K/UL (ref 0–0.2)
BASOPHILS RELATIVE PERCENT: 1.1 %
BILIRUB SERPL-MCNC: 0.4 MG/DL (ref 0–1)
BILIRUBIN DIRECT: <0.2 MG/DL (ref 0–0.3)
BILIRUBIN, INDIRECT: ABNORMAL MG/DL (ref 0–1)
BUN BLDV-MCNC: 13 MG/DL (ref 7–20)
CALCIUM SERPL-MCNC: 8.4 MG/DL (ref 8.3–10.6)
CHLORIDE BLD-SCNC: 102 MMOL/L (ref 99–110)
CO2: 25 MMOL/L (ref 21–32)
CREAT SERPL-MCNC: 0.9 MG/DL (ref 0.8–1.3)
EKG ATRIAL RATE: 66 BPM
EKG DIAGNOSIS: NORMAL
EKG Q-T INTERVAL: 480 MS
EKG QRS DURATION: 126 MS
EKG QTC CALCULATION (BAZETT): 499 MS
EKG R AXIS: 51 DEGREES
EKG T AXIS: -4 DEGREES
EKG VENTRICULAR RATE: 65 BPM
EOSINOPHILS ABSOLUTE: 0.3 K/UL (ref 0–0.6)
EOSINOPHILS RELATIVE PERCENT: 3.5 %
GFR AFRICAN AMERICAN: >60
GFR NON-AFRICAN AMERICAN: >60
GLUCOSE BLD-MCNC: 108 MG/DL (ref 70–99)
HCT VFR BLD CALC: 37 % (ref 40.5–52.5)
HEMOGLOBIN: 12.1 G/DL (ref 13.5–17.5)
LYMPHOCYTES ABSOLUTE: 1.3 K/UL (ref 1–5.1)
LYMPHOCYTES RELATIVE PERCENT: 13.2 %
MCH RBC QN AUTO: 28.2 PG (ref 26–34)
MCHC RBC AUTO-ENTMCNC: 32.6 G/DL (ref 31–36)
MCV RBC AUTO: 86.5 FL (ref 80–100)
MONOCYTES ABSOLUTE: 1.1 K/UL (ref 0–1.3)
MONOCYTES RELATIVE PERCENT: 11.3 %
NEUTROPHILS ABSOLUTE: 7 K/UL (ref 1.7–7.7)
NEUTROPHILS RELATIVE PERCENT: 70.9 %
PDW BLD-RTO: 14.9 % (ref 12.4–15.4)
PLATELET # BLD: 290 K/UL (ref 135–450)
PMV BLD AUTO: 7.7 FL (ref 5–10.5)
POTASSIUM REFLEX MAGNESIUM: 4 MMOL/L (ref 3.5–5.1)
PRO-BNP: 2811 PG/ML (ref 0–449)
RBC # BLD: 4.28 M/UL (ref 4.2–5.9)
SARS-COV-2, NAAT: NOT DETECTED
SODIUM BLD-SCNC: 138 MMOL/L (ref 136–145)
TOTAL PROTEIN: 6 G/DL (ref 6.4–8.2)
TROPONIN: 0.02 NG/ML
TROPONIN: 0.02 NG/ML
WBC # BLD: 9.9 K/UL (ref 4–11)

## 2020-07-14 PROCEDURE — 96374 THER/PROPH/DIAG INJ IV PUSH: CPT

## 2020-07-14 PROCEDURE — 83880 ASSAY OF NATRIURETIC PEPTIDE: CPT

## 2020-07-14 PROCEDURE — 71046 X-RAY EXAM CHEST 2 VIEWS: CPT

## 2020-07-14 PROCEDURE — 71045 X-RAY EXAM CHEST 1 VIEW: CPT

## 2020-07-14 PROCEDURE — 6360000002 HC RX W HCPCS: Performed by: PHYSICIAN ASSISTANT

## 2020-07-14 PROCEDURE — 80076 HEPATIC FUNCTION PANEL: CPT

## 2020-07-14 PROCEDURE — 93005 ELECTROCARDIOGRAM TRACING: CPT | Performed by: PHYSICIAN ASSISTANT

## 2020-07-14 PROCEDURE — 85025 COMPLETE CBC W/AUTO DIFF WBC: CPT

## 2020-07-14 PROCEDURE — 80048 BASIC METABOLIC PNL TOTAL CA: CPT

## 2020-07-14 PROCEDURE — U0002 COVID-19 LAB TEST NON-CDC: HCPCS

## 2020-07-14 PROCEDURE — 84484 ASSAY OF TROPONIN QUANT: CPT

## 2020-07-14 PROCEDURE — 99284 EMERGENCY DEPT VISIT MOD MDM: CPT

## 2020-07-14 PROCEDURE — 36415 COLL VENOUS BLD VENIPUNCTURE: CPT

## 2020-07-14 RX ORDER — FUROSEMIDE 10 MG/ML
60 INJECTION INTRAMUSCULAR; INTRAVENOUS ONCE
Status: COMPLETED | OUTPATIENT
Start: 2020-07-14 | End: 2020-07-14

## 2020-07-14 RX ADMIN — FUROSEMIDE 60 MG: 10 INJECTION, SOLUTION INTRAMUSCULAR; INTRAVENOUS at 16:22

## 2020-07-14 ASSESSMENT — ENCOUNTER SYMPTOMS
COUGH: 0
VOMITING: 0
ABDOMINAL PAIN: 0
CHEST TIGHTNESS: 0
NAUSEA: 0
SHORTNESS OF BREATH: 0
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
## 2020-07-14 NOTE — ED NOTES
Patient had urinated through his brief when this RN went to check on him. Perineal care was administered, a new depends was placed on patient, an absorbent pad was placed under the patient, and a new sheet and warm blanket was given to the patient. Patient tolerated well. Will continue to monitor.      Mingo Bocanegra RN  07/14/20 [id number]

## 2020-07-14 NOTE — ED NOTES
Bed: A04-04  Expected date:   Expected time:   Means of arrival:   Comments:  Ata Buchanan RN  07/14/20 [id number]
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
## 2020-07-14 NOTE — DISCHARGE INSTR - COC
Continuity of Care Form    Patient Name: Juliet Meehan   :  1927  MRN:  [id number]    Admit date:  2020  Discharge date:  2020    Code Status Order: Prior   Advance Directives:     Admitting Physician:  No admitting provider for patient encounter. PCP: Lisbeth Bocanegra MD    Discharging Nurse: Naye Unit/Room#: E13/A41-59  Discharging Unit Phone Number: [phone]    Emergency Contact:   Extended Emergency Contact Information  Primary Emergency Contact: Cassandra Kohli  Address: [address], [address] [place] [address] Phone: [phone]  Mobile Phone: [phone]  Relation: Spouse    Past Surgical History:  Past Surgical History:   Procedure Laterality Date    CAPSULOTOMY Bilateral 2011    [facility] - Dr. Elsa Nino, YAG capsulotomies for secondary cataracts    CATARACT REMOVAL WITH IMPLANT Left 10/6/2006    with vitrectomy & membrane peel [facility] - Dr Carlton Kay and Bro Granado    COLONOSCOPY  05    diverticulosis, no polyps Dr. Lindsay Hoffman. No re-screening needed per Dr. Lindsay Hoffman.     CORONARY ANGIOPLASTY WITH STENT PLACEMENT  2016    PDA     HERNIA REPAIR      SKIN BIOPSY Right 2012    Right ear keloid - Dr. Salima Self Right 10/2010    Squamous cell - right ear - Dr. Robert Manning N/A 2020    EGD DIAGNOSTIC ONLY performed by Dallin Jo MD at [facility] VITRECTOMY Right 2007    with membrane peel - eye [facility] - Dr Carlton Kay       Immunization History:   Immunization History   Administered Date(s) Administered    Influenza Vaccine, unspecified formulation 2016    Influenza Virus Vaccine 10/05/2010, 10/03/2011, 10/01/2012, 10/07/2014    Influenza, High Dose (Fluzone 65 yrs and older) 2013, 10/04/2017, 2018    Influenza, Triv, inactivated, subunit, adjuvanted, IM (Fluad 65 yrs and older) 10/14/2019    Pneumococcal Conjugate 13-valent (Kiah Betancourt) 2016    Pneumococcal Conjugate 7-valent (Prevnar7) 04/26/2010    Zoster Live (Zostavax) 11/17/2014       Active Problems:  Patient Active Problem List   Diagnosis Code    PUD (peptic ulcer disease) K27.9    GERD (gastroesophageal reflux disease) K21.9    Hyperlipidemia with target LDL less than 100 E78.5    Carotid stenosis, bilateral I65.23    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 [facility]) L59.275W    Moderate aortic insufficiency I35.1    Hypertension I10    Coronary artery disease due to lipid rich plaque I25.10, I25.83    Multi-infarct state I69.30    Chronic atrial fibrillation I48.20    Bilateral lower extremity edema R60.0    Elevated TSH R79.89    Pharyngoesophageal dysphagia R13.14    Dental disease K08.9    Chronic diastolic heart failure (HCC) I50.32       Isolation/Infection:   Isolation          No Isolation        Patient Infection Status     Infection Onset Added Last Indicated Last Indicated By Review Planned Expiration Resolved Resolved By    COVID-19 Rule Out 07/14/20 07/14/20 07/14/20 COVID-19 (Ordered)              Nurse Assessment:  Last Vital Signs: /74   Pulse 61   Temp 98.4 °F (36.9 °C) (Oral)   Resp 14   Ht 5' 9\" (1.753 m)   Wt 145 lb (65.8 kg)   SpO2 95%   BMI 21.41 kg/m²     Last documented pain score (0-10 scale):    Last Weight:   Wt Readings from Last 1 Encounters:   07/14/20 145 lb (65.8 kg)     Mental Status:  oriented, alert, logical and thought processes intact    IV Access:  - None    Nursing Mobility/ADLs:  Walking   Assisted  Transfer  Assisted  Bathing  Assisted  Dressing  Assisted  Toileting  Assisted  Feeding  Assisted  Med Admin  Assisted  Med Delivery   none    Wound Care Documentation and Therapy:        Elimination:  Continence:   · Bowel: {YES / XS:50329}  · Bladder: {YES / YY:15945}  Urinary Catheter: {Urinary Catheter:868822573}   Colostomy/Ileostomy/Ileal Conduit: {YES / PS:60991}       Date of Last BM: ***  No intake or output data in the 24 hours ending 07/14/20 1548  No intake/output data recorded. Safety Concerns: At Risk for Falls    Impairments/Disabilities:      Hearing    Nutrition Therapy:  Current Nutrition Therapy:   - Oral Diet:  General    Routes of Feeding: Oral  Liquids: No Restrictions  Daily Fluid Restriction: no  Last Modified Barium Swallow with Video (Video Swallowing Test): not done    Treatments at the Time of Hospital Discharge:   Respiratory Treatments: ***  Oxygen Therapy:  {Therapy; copd oxygen:45967}  Ventilator:    {MH CC Vent TCUS:997069159}    Rehab Therapies: Physical Therapy and Occupational Therapy  Weight Bearing Status/Restrictions: No weight bearing restirctions  Other Medical Equipment (for information only, NOT a DME order):  {EQUIPMENT:966523851}  Other Treatments: ***    Patient's personal belongings (please select all that are sent with patient):  {CHP DME Belongings:126078907}    RN SIGNATURE:  {Esignature:786258141}    CASE MANAGEMENT/SOCIAL WORK SECTION    Inpatient Status Date: ER    Readmission Risk Assessment Score:  Readmission Risk              Risk of Unplanned Readmission:        0           Discharging to Facility/ Agency   · Name: [facility]  · Address:[address] [place]  · Phone:[phone]  · ZHL:[phone]      / signature: Electronically signed by CARLOS Figueroa on 7/14/20 at 5:45 PM EDT    PHYSICIAN SECTION    Prognosis: Good    Condition at Discharge: Stable    Rehab Potential (if transferring to Rehab): Good    Recommended Labs or Other Treatments After Discharge: PT/OT    Physician Certification: I certify the above information and transfer of Qing Bernal  is necessary for the continuing treatment of the diagnosis listed and that he requires [facility] for less 30 days.      Update Admission H&P: No change in H&P    PHYSICIAN SIGNATURE:  Electronically signed by Hadyer Paniagua MD on 7/14/20 at 3:48 PM EDT

## 2020-07-14 NOTE — ED PROVIDER NOTES
[address] [facility] [address] ENCOUNTER          PHYSICIAN ASSISTANT NOTE       Date of evaluation: 7/14/2020    Chief Complaint     Other (Pt needs COVID test for ECF placement.)      History of Present Illness     Chastity Rodriguez is a 80 y.o. male who presents to the emergency department from home for COVID testing. 2 days ago his wife broke her hip. She is the primary caregiver and patient is unable to complete his activities of daily living. He has plans to go to a nursing facility for care, but they are requesting COVID testing prior to being accepted. Patient has no concerns, no chest pain or shortness of breath, no abdominal pain, nausea or vomiting. He has had no known exposures to COVID-19. He does admit to bilateral lower leg swelling which is been present for the last few days. He has been taking his water pill as prescribed. Review of Systems     Review of Systems   Constitutional: Negative for chills, diaphoresis, fatigue and fever. Respiratory: Negative for cough, chest tightness and shortness of breath. Cardiovascular: Positive for leg swelling. Negative for chest pain and palpitations. Gastrointestinal: Negative for abdominal pain, nausea and vomiting. 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 He has a past surgical history that includes hernia repair; capsulotomy (Bilateral, 11/2011); skin biopsy (Right, 11/2012); Skin cancer excision (Right, 10/2010); vitrectomy (Right, 6/6/2007); Cataract removal with implant (Left, 10/6/2006); Colonoscopy (1/11/05);  Coronary angioplasty with stent (11/2016); and Upper gastrointestinal endoscopy (N/A, 1/6/2020). His family history is not on file. He reports that he has never smoked. He has never used smokeless tobacco. He reports current alcohol use of about 1.0 standard drinks of alcohol per week. He reports that he does not use drugs. Medications     Previous Medications    ACETAMINOPHEN (TYLENOL) 325 MG TABLET    Take 2 tablets by mouth every 6 hours as needed for Pain    ATORVASTATIN (LIPITOR) 80 MG TABLET    TAKE 1 TABLET BY MOUTH ONE TIME A DAY     ELIQUIS 2.5 MG TABS TABLET    Take 1 tablet by mouth twice daily    FINASTERIDE (PROSCAR) 5 MG TABLET    TAKE 1 TABLET BY MOUTH ONCE DAILY    FUROSEMIDE (LASIX) 40 MG TABLET    TAKE 1 & 1/2 (ONE & ONE-HALF) TABLETS BY MOUTH ONCE DAILY IN THE MORNING    OMEPRAZOLE (PRILOSEC OTC) 20 MG TABLET    1 daily to protect stomach. POLYETHYLENE GLYCOL (GLYCOLAX) POWDER    Take as directed every other day for constipation    POTASSIUM CHLORIDE (KLOR-CON M) 10 MEQ EXTENDED RELEASE TABLET    Take 2 tablets by mouth daily    TAMSULOSIN (FLOMAX) 0.4 MG CAPSULE    TAKE 1 CAPSULE BY MOUTH ONCE DAILY IN THE EVENING FOR PROSTATE AND URINATION    VITAMIN B-12 (CYANOCOBALAMIN) 1000 MCG TABLET    Take OTC B12 or similar B-complex vitamins 2eh=2026 mcg a day for neurological health and as a natural energy booster (read a bottle of 5 Hour Energy or diet Red Bull). High B12 levels are proven to help prevent dementia & neuropathy. Allergies     He is allergic to aspirin. Physical Exam     INITIAL VITALS: BP: 130/67, Temp: 98.4 °F (36.9 °C), Pulse: 56, Resp: 16, SpO2: 97 %  Physical Exam  Vitals signs and nursing note reviewed. Constitutional:       General: He is not in acute distress. Appearance: Normal appearance. He is not ill-appearing, toxic-appearing or diaphoretic. HENT:      Head: Normocephalic. Cardiovascular:      Rate and Rhythm: Normal rate and regular rhythm. Pulses: Normal pulses. Heart sounds: Normal heart sounds. No murmur. No friction rub. No gallop. Pulmonary:      Effort: Pulmonary effort is normal. No respiratory distress. Breath sounds: Normal breath sounds. Abdominal:      Tenderness: There is no abdominal tenderness. Musculoskeletal:      Right lower leg: Edema present. Left lower leg: Edema present. Comments: Bilateral lower leg 2+ pitting edema   Neurological:      General: No focal deficit present. Mental Status: He is alert and oriented to person, place, and time. Psychiatric:         Mood and Affect: Mood normal.         Behavior: Behavior normal.       Diagnostic Results     RADIOLOGY:  XR CHEST STANDARD (2 VW)   Final Result      Bibasilar linear atelectasis/scarring. Elevated right hemidiaphragm. Normal cardiomediastinal silhouette. XR CHEST PORTABLE   Final Result      Possible left lower lobe consolidation. PA and lateral chest is recommended.       Poor respiratory effort with crowding of the bronchovascular markings and bibasilar atelectasis                LABS:   Results for orders placed or performed during the hospital encounter of 07/14/20   CBC Auto Differential   Result Value Ref Range    WBC 9.9 4.0 - 11.0 K/uL    RBC 4.28 4.20 - 5.90 M/uL    Hemoglobin 12.1 (L) 13.5 - 17.5 g/dL    Hematocrit 37.0 (L) 40.5 - 52.5 %    MCV 86.5 80.0 - 100.0 fL    MCH 28.2 26.0 - 34.0 pg    MCHC 32.6 31.0 - 36.0 g/dL    RDW 14.9 12.4 - 15.4 %    Platelets 306 112 - 708 K/uL    MPV 7.7 5.0 - 10.5 fL    Neutrophils % 70.9 %    Lymphocytes % 13.2 %    Monocytes % 11.3 %    Eosinophils % 3.5 %    Basophils % 1.1 %    Neutrophils Absolute 7.0 1.7 - 7.7 K/uL    Lymphocytes Absolute 1.3 1.0 - 5.1 K/uL    Monocytes Absolute 1.1 0.0 - 1.3 K/uL    Eosinophils Absolute 0.3 0.0 - 0.6 K/uL    Basophils Absolute 0.1 0.0 - 0.2 K/uL   Basic Metabolic Panel w/ Reflex to MG   Result Value Ref Range    Sodium 138 136 - 145 mmol/L    Potassium reflex Magnesium 4.0 3.5 - 5.1 mmol/L    Chloride 102 99 - 110 mmol/L    CO2 25 21 - 32 mmol/L    Anion Gap 11 3 - 16    Glucose 108 (H) 70 - 99 mg/dL    BUN 13 7 - 20 mg/dL    CREATININE 0.9 0.8 - 1.3 mg/dL    GFR Non-African American >60 >60    GFR African American >60 >60    Calcium 8.4 8.3 - 10.6 mg/dL   Hepatic Function Panel   Result Value Ref Range    Total Protein 6.0 (L) 6.4 - 8.2 g/dL    Alb 3.1 (L) 3.4 - 5.0 g/dL    Alkaline Phosphatase 253 (H) 40 - 129 U/L    ALT 10 10 - 40 U/L    AST 22 15 - 37 U/L    Total Bilirubin 0.4 0.0 - 1.0 mg/dL    Bilirubin, Direct <0.2 0.0 - 0.3 mg/dL    Bilirubin, Indirect see below 0.0 - 1.0 mg/dL   Brain Natriuretic Peptide   Result Value Ref Range    Pro-BNP 2,811 (H) 0 - 449 pg/mL   Troponin   Result Value Ref Range    Troponin 0.02 (H) <0.01 ng/mL   COVID-19   Result Value Ref Range    SARS-CoV-2, NAAT Not Detected Not Detected   EKG 12 Lead   Result Value Ref Range    Ventricular Rate 65 BPM    Atrial Rate 66 BPM    QRS Duration 126 ms    Q-T Interval 480 ms    QTc Calculation (Bazett) 499 ms    R Axis 51 degrees    T Axis -4 degrees    Diagnosis       EKG performed in ER and to be interpreted by ER physician. Confirmed by MD, ER ([id number]),  Emily Sanabria ([id number]) on 7/14/2020 2:53:18 PM       ED BEDSIDE ULTRASOUND:  none    RECENT VITALS:  BP: 134/74, Temp: 98.4 °F (36.9 °C), Pulse: 61, Resp: 14, SpO2: 95 %     Procedures   None    ED Course     Nursing Notes, Past Medical Hx,Past Surgical Hx, Social Hx, Allergies, and Family Hx were reviewed. The patient was given the following medications:  Orders Placed This Encounter   Medications    furosemide (LASIX) injection 60 mg       CONSULTS:  None    MEDICAL DECISION MAKING / ASSESSMENT / Ether Kota is a 80 y.o. male presenting to the emergency department from home, requesting COVID testing prior to admission to nursing facility.   Upon presentation he was well in appearance, no acute complaints, vitals normal, he was nontoxic in appearance. On exam he did have bilateral 2+ pitting edema and does feel swollen despite taking oral Lasix. Labs completed showing mildly elevated BNP at 2811 as well as troponin of 0.02. Elevated troponin may be due to heart strain from heart failure, also may be baseline troponin leak, plan to repeat. If troponin is stable, likely chronic issue as patient denies any chest pain or active shortness of breath. He will be cleared to go to nursing facility with no acute interventions warranted. If troponin is elevated, plan to admit to medicine for further evaluation and management. He was given 60 mg of IV Lasix, correlating with home dose of 60 mg daily for edema noted on exam.  Chest x-ray completed showing no acute cardiopulmonary process with basilar linear atelectasis/scarring. Covid testing was negative. At this time I am going off service and my colleague will follow up on results of repeat troponin as well as ultimate disposition. This patient was also evaluated by the attending physician. All care plans were discussed and agreed upon. Clinical Impression     1. Bilateral leg edema    2. Encounter for laboratory testing for COVID-19 virus        Disposition     PATIENT REFERRED TO:  No follow-up provider specified.     DISCHARGE MEDICATIONS:  New Prescriptions    No medications on file       DISPOSITION         Isabel Hart PA-C  07/14/20 [id number]

## 2020-07-14 NOTE — ED PROVIDER NOTES
Anion Gap 11 3 - 16    Glucose 108 (H) 70 - 99 mg/dL    BUN 13 7 - 20 mg/dL    CREATININE 0.9 0.8 - 1.3 mg/dL    GFR Non-African American >60 >60    GFR African American >60 >60    Calcium 8.4 8.3 - 10.6 mg/dL   Hepatic Function Panel   Result Value Ref Range    Total Protein 6.0 (L) 6.4 - 8.2 g/dL    Alb 3.1 (L) 3.4 - 5.0 g/dL    Alkaline Phosphatase 253 (H) 40 - 129 U/L    ALT 10 10 - 40 U/L    AST 22 15 - 37 U/L    Total Bilirubin 0.4 0.0 - 1.0 mg/dL    Bilirubin, Direct <0.2 0.0 - 0.3 mg/dL    Bilirubin, Indirect see below 0.0 - 1.0 mg/dL   Brain Natriuretic Peptide   Result Value Ref Range    Pro-BNP 2,811 (H) 0 - 449 pg/mL   Troponin   Result Value Ref Range    Troponin 0.02 (H) <0.01 ng/mL   COVID-19   Result Value Ref Range    SARS-CoV-2, NAAT Not Detected Not Detected   Troponin   Result Value Ref Range    Troponin 0.02 (H) <0.01 ng/mL   EKG 12 Lead   Result Value Ref Range    Ventricular Rate 65 BPM    Atrial Rate 66 BPM    QRS Duration 126 ms    Q-T Interval 480 ms    QTc Calculation (Bazett) 499 ms    R Axis 51 degrees    T Axis -4 degrees    Diagnosis       EKG performed in ER and to be interpreted by ER physician. Confirmed by MD, ER ([id number]),  Alvarado Spann ([phone] on 7/14/2020 2:53:18 PM       RECENT VITALS:  BP: 116/77, Temp: 98.4 °F (36.9 °C), Pulse: 55, Resp: (!) 37, SpO2: 95 %       ED Course     The patient was given the following medications:  Orders Placed This Encounter   Medications    furosemide (LASIX) injection 60 mg       CONSULTS:  None    MEDICAL DECISION MAKING / ASSESSMENT / Aracelygissell Santana is a 80 y.o. male who presented for COVID testing. At the time of turnover repeat troponin was pending. This was the same as the prior troponin today indicating this is likely a chronic issue. Patient's COVID test was negative. Given this the patient is cleared for admission to nursing facility for care. This patient was also evaluated by the attending physician.  All care plans were discussed and agreed upon. Clinical Impression     1. Bilateral leg edema    2.  Encounter for laboratory testing for COVID-19 virus        Disposition     PATIENT REFERRED TO:  Jaida Lainez MD  [address] [facility] [address] [facility]  [phone]            DISCHARGE MEDICATIONS:  New Prescriptions    No medications on file       DISPOSITION Decision To Discharge 07/14/2020 05:33:07 PM          [place], [place]  07/14/20 2037

## 2020-07-15 NOTE — ED NOTES
Attempted to give report to [facility] 3 times without success. The first attempt a  answered, stated to hold on, and then disconnected the call. Attempted to call back two separate times and no one answered the phone.      Eloina Domingo RN  07/14/20 2042

## 2020-07-15 NOTE — ED NOTES
Patient discharged to [facility] with [address] on stretcher.      Elisa Marquez RN  07/14/20 1365

## 2020-07-28 ENCOUNTER — TELEPHONE (OUTPATIENT)
Dept: INTERNAL MEDICINE CLINIC | Age: 85
End: 2020-07-28

## 2020-07-29 ENCOUNTER — CARE COORDINATION (OUTPATIENT)
Dept: CASE MANAGEMENT | Age: 85
End: 2020-07-29

## 2020-07-29 NOTE — CARE COORDINATION
Jc 45 Transitions Initial Follow Up Call    Call within 2 business days of discharge: Yes    Patient: Juliet Meehan Patient : 1927   MRN: <[id number]>  Reason for Admission: [address]  Discharge Date: 20 RARS: Readmission Risk Score: 12      Last Discharge [facility]       Complaint Diagnosis Description Type Department Provider    20 Other Bilateral leg edema . .. ED (DISCHARGE) [address] ED Octavia Montilla MD        Spoke with Abigail Rendon after 2 pt identifiers. Pt went to hospital for BLLE after Cassandra spouse and PCG suffered a fractured coccyx. She is doing much better now and able to resume some care. Someone comes in daily to clean him and prepare him for bed and it working out well. Therapy is also seeing pt.  Denies further needs and closing case      Vianney Shea [phone]     Spoke with: [address]: [facility]    Non-face-to-face services provided:      Care Transitions 24 Hour Call    Do you have any ongoing symptoms?:  No  Do you have all of your prescriptions and are they filled?:  Yes  Have you scheduled your follow up appointment?:  Yes  How are you going to get to your appointment?:  Car - family or friend to transport  Were you discharged with any Home Care or Post Acute Services:  Yes  Post Acute Services:  Home Health (Comment: [facility])  Patient DME:  Daniel Anthonyly cane, Shower chair, Other, [employer]  Other Patient DME:  Dairl Angelucci at [employer]   Do you have support at home?:  Partner/Spouse/SO  Do you feel like you have everything you need to keep you well at home?:  Yes  Are you an active caregiver in your home?:  No  Care Transitions Interventions         Follow Up  Future Appointments   Date Time Provider Jasmin Hubbard   3/17/2021  9:30 AM [facility]       Vianney Shea, RN

## 2020-07-30 ENCOUNTER — TELEPHONE (OUTPATIENT)
Dept: INTERNAL MEDICINE CLINIC | Age: 85
End: 2020-07-30

## 2020-07-30 NOTE — TELEPHONE ENCOUNTER
Adriana with [facility] called to advise the patient restarted home health and she is in need of an updated medication list faxed to [phone]. Please advise.

## 2020-08-03 ENCOUNTER — TELEPHONE (OUTPATIENT)
Dept: INTERNAL MEDICINE CLINIC | Age: 85
End: 2020-08-03

## 2020-08-03 NOTE — TELEPHONE ENCOUNTER
Health calls would like to know if pt is suppose to be on atorvastatin (LIPITOR) 80 MG tablet. Health calls states if pt is suppose to be on medication pt needs a refill.  Health call also would like to know if pt had a stint placed in the past.

## 2020-09-23 RX ORDER — FINASTERIDE 5 MG/1
5 TABLET, FILM COATED ORAL DAILY
Qty: 90 TABLET | Refills: 3 | Status: SHIPPED | OUTPATIENT
Start: 2020-09-23

## 2020-10-23 RX ORDER — POTASSIUM CHLORIDE 750 MG/1
TABLET, FILM COATED, EXTENDED RELEASE ORAL
Qty: 180 TABLET | Refills: 0 | Status: SHIPPED | OUTPATIENT
Start: 2020-10-23 | End: 2021-02-22 | Stop reason: ALTCHOICE

## 2020-11-22 NOTE — PROGRESS NOTES
"GENERAL SURGERY  PROGRESS NOTE    Admit Date: 11/19/2020  Hospital Day: 1    Calcium down-trending, 10.7 from 12.2 from 14.7   Patient more alert this morning, closer to her "normal self" per friend at bedside  Abdominal ascites causing mild abdominal discomfort today, was last drained 11/20.   Minimal PO intake secondary to low appetite  Otherwise no acute events    Physical Exam:  GEN: NAD, interactive  NEURO: alert, oriented to self/place/year  CV: regular rate  PULM: normal WOB  ABD: soft, NT. Distended, at baseline. 1,000mL of clear, yellow peritoneal fluid drained at bedside using patient's home peritoneal PleurX drainage equipment. Following drainage  Of 1L, patient's abdomen still distended (patient only had 1 drainage container available for bedside drainage)  MSK/EXT: moves all ext spontaneously    Inpatient Data      Vitals:   Temp:  [97.2 °F (36.2 °C)-98.1 °F (36.7 °C)]   Pulse:  [73-92]   Resp:  [16-20]   BP: (102-119)/(52-56)   SpO2:  [98 %-100 %]     Diet Adult Regular (IDDSI Level 7)     Intake/Output Summary (Last 24 hours) at 11/22/2020 1449  Last data filed at 11/22/2020 1103  Gross per 24 hour   Intake 3201.67 ml   Output 1300 ml   Net 1901.67 ml          Recent Labs     11/19/20  2333 11/20/20  0611  11/20/20  2305 11/21/20  0612 11/21/20  1632 11/22/20  0555 11/22/20  1009   WBC  --  8.24  --   --  9.68  --  11.21  --    HGB  --  8.1*  --   --  8.8*  --  8.3*  --    HCT  --  25.7*  --   --  28.7*  --  26.0*  --    PLT  --  115*  --   --  115*  --  99*  --     144   < > 146* 145 146*  --  147*   K 2.7* 2.6*   < > 3.3* 3.7 3.2*  --  3.4*    110   < > 111* 113* 114*  --  116*   CO2 25 25   < > 24 17* 20*  --  18*   BUN 33* 32*   < > 29* 28* 26*  --  25*   CREATININE 1.8* 1.7*   < > 1.5* 1.4 1.3  --  1.2   BILITOT 1.6* 1.5*  --  1.8*  --   --   --   --    AST 21 20  --  22  --   --   --   --    ALT 11 10  --  10  --   --   --   --    ALKPHOS 387* 366*  --  393*  --   --   --   --  " POC: NG clamp trial with no residual. Pt denies nausea/vomiting. Abdomen NT/ND. NG tube removed. Start clear liquid diet.     Bettye Johsnon MD  PGY-1 General Surgery  08/15/18 1:57 PM  [phone]   CALCIUM 14.7* 14.0*   < > 12.8* 12.2* 11.1*  --  10.7*   ALBUMIN 2.6* 2.4*  --  2.5*  --   --   --   --    PROT 6.8 6.4  --  6.6  --   --   --   --    MG 1.8 1.8   < >  --  1.6 1.5* 1.7  --    PHOS  --  4.3   < >  --  3.1 2.7 2.7  --     < > = values in this interval not displayed.     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    -patient's family has revoked her Hospice status while she is inpatient and wishes to pursue treatment for her hypercalcemia  -they would like the patient to be treated until she has normal mental status, at which time they will request discharge back home with home hospice  -palliative care on board, appreciate their assistance with goals of care discussions  -will continue to treat hypercalcemia, which is improving  -Mental status near baseline, continue to monitor  -continuing home calcium-lowering medications, have switched to IV where possible   -regular diet as tolerated, IV fluids due to low PO intake  -continue IV ceftriaxone for UTI   -will consult IR for therapeutic paracentesis, as patient does not have any more of her PleurX containers for bedside use and the hospital does not have containers compatible with her type of peritoneal catheter  -Dispo: home with home hospice pending stabilization of calcium    Rogelio Renee MD  [employer] General Surgery PGY-2  11/22/2020, 1:48 PM

## 2021-02-03 ENCOUNTER — OFFICE VISIT (OUTPATIENT)
Dept: INTERNAL MEDICINE CLINIC | Age: 86
End: 2021-02-03
Payer: MEDICARE

## 2021-02-03 VITALS
WEIGHT: 140 LBS | TEMPERATURE: 97.4 F | SYSTOLIC BLOOD PRESSURE: 122 MMHG | BODY MASS INDEX: 20.67 KG/M2 | DIASTOLIC BLOOD PRESSURE: 68 MMHG

## 2021-02-03 DIAGNOSIS — E53.8 B12 DEFICIENCY: ICD-10-CM

## 2021-02-03 DIAGNOSIS — E78.5 HYPERLIPIDEMIA WITH TARGET LDL LESS THAN 100: Primary | ICD-10-CM

## 2021-02-03 DIAGNOSIS — I10 ESSENTIAL HYPERTENSION: ICD-10-CM

## 2021-02-03 DIAGNOSIS — E03.9 HYPOTHYROIDISM, UNSPECIFIED TYPE: ICD-10-CM

## 2021-02-03 DIAGNOSIS — R74.8 ELEVATED ALKALINE PHOSPHATASE LEVEL: ICD-10-CM

## 2021-02-03 DIAGNOSIS — K08.9 DENTAL DISEASE: ICD-10-CM

## 2021-02-03 DIAGNOSIS — I69.30 MULTI-INFARCT STATE: ICD-10-CM

## 2021-02-03 DIAGNOSIS — I48.20 CHRONIC ATRIAL FIBRILLATION (HCC): ICD-10-CM

## 2021-02-03 DIAGNOSIS — I50.32 CHRONIC DIASTOLIC HEART FAILURE (HCC): ICD-10-CM

## 2021-02-03 DIAGNOSIS — E78.5 HYPERLIPIDEMIA WITH TARGET LDL LESS THAN 100: ICD-10-CM

## 2021-02-03 LAB
A/G RATIO: 1.1 (ref 1.1–2.2)
ALBUMIN SERPL-MCNC: 3 G/DL (ref 3.4–5)
ALP BLD-CCNC: 238 U/L (ref 40–129)
ALT SERPL-CCNC: 12 U/L (ref 10–40)
ANION GAP SERPL CALCULATED.3IONS-SCNC: 11 MMOL/L (ref 3–16)
AST SERPL-CCNC: 23 U/L (ref 15–37)
BASOPHILS ABSOLUTE: 0.1 K/UL (ref 0–0.2)
BASOPHILS RELATIVE PERCENT: 0.7 %
BILIRUB SERPL-MCNC: 0.5 MG/DL (ref 0–1)
BUN BLDV-MCNC: 18 MG/DL (ref 7–20)
CALCIUM SERPL-MCNC: 9.3 MG/DL (ref 8.3–10.6)
CHLORIDE BLD-SCNC: 104 MMOL/L (ref 99–110)
CHOLESTEROL, TOTAL: 152 MG/DL (ref 0–199)
CO2: 26 MMOL/L (ref 21–32)
CREAT SERPL-MCNC: 0.9 MG/DL (ref 0.8–1.3)
EOSINOPHILS ABSOLUTE: 0.3 K/UL (ref 0–0.6)
EOSINOPHILS RELATIVE PERCENT: 3.6 %
GFR AFRICAN AMERICAN: >60
GFR NON-AFRICAN AMERICAN: >60
GLOBULIN: 2.8 G/DL
GLUCOSE BLD-MCNC: 94 MG/DL (ref 70–99)
HCT VFR BLD CALC: 45.9 % (ref 40.5–52.5)
HDLC SERPL-MCNC: 39 MG/DL (ref 40–60)
HEMOGLOBIN: 14.6 G/DL (ref 13.5–17.5)
LDL CHOLESTEROL CALCULATED: 93 MG/DL
LYMPHOCYTES ABSOLUTE: 1.4 K/UL (ref 1–5.1)
LYMPHOCYTES RELATIVE PERCENT: 18.5 %
MCH RBC QN AUTO: 30.9 PG (ref 26–34)
MCHC RBC AUTO-ENTMCNC: 31.9 G/DL (ref 31–36)
MCV RBC AUTO: 97 FL (ref 80–100)
MONOCYTES ABSOLUTE: 0.7 K/UL (ref 0–1.3)
MONOCYTES RELATIVE PERCENT: 9.6 %
NEUTROPHILS ABSOLUTE: 5.3 K/UL (ref 1.7–7.7)
NEUTROPHILS RELATIVE PERCENT: 67.6 %
PDW BLD-RTO: 16.4 % (ref 12.4–15.4)
PLATELET # BLD: 243 K/UL (ref 135–450)
PMV BLD AUTO: 8.7 FL (ref 5–10.5)
POTASSIUM SERPL-SCNC: 4.5 MMOL/L (ref 3.5–5.1)
RBC # BLD: 4.74 M/UL (ref 4.2–5.9)
SODIUM BLD-SCNC: 141 MMOL/L (ref 136–145)
T4 FREE: 1.3 NG/DL (ref 0.9–1.8)
TOTAL PROTEIN: 5.8 G/DL (ref 6.4–8.2)
TRIGL SERPL-MCNC: 98 MG/DL (ref 0–150)
TSH SERPL DL<=0.05 MIU/L-ACNC: 5.96 UIU/ML (ref 0.27–4.2)
VITAMIN B-12: 1072 PG/ML (ref 211–911)
VLDLC SERPL CALC-MCNC: 20 MG/DL
WBC # BLD: 7.8 K/UL (ref 4–11)

## 2021-02-03 PROCEDURE — 1123F ACP DISCUSS/DSCN MKR DOCD: CPT | Performed by: INTERNAL MEDICINE

## 2021-02-03 PROCEDURE — 4040F PNEUMOC VAC/ADMIN/RCVD: CPT | Performed by: INTERNAL MEDICINE

## 2021-02-03 PROCEDURE — G8484 FLU IMMUNIZE NO ADMIN: HCPCS | Performed by: INTERNAL MEDICINE

## 2021-02-03 PROCEDURE — 1036F TOBACCO NON-USER: CPT | Performed by: INTERNAL MEDICINE

## 2021-02-03 PROCEDURE — G8427 DOCREV CUR MEDS BY ELIG CLIN: HCPCS | Performed by: INTERNAL MEDICINE

## 2021-02-03 PROCEDURE — 99214 OFFICE O/P EST MOD 30 MIN: CPT | Performed by: INTERNAL MEDICINE

## 2021-02-03 PROCEDURE — G8420 CALC BMI NORM PARAMETERS: HCPCS | Performed by: INTERNAL MEDICINE

## 2021-02-03 ASSESSMENT — PATIENT HEALTH QUESTIONNAIRE - PHQ9
SUM OF ALL RESPONSES TO PHQ QUESTIONS 1-9: 0
SUM OF ALL RESPONSES TO PHQ QUESTIONS 1-9: 0
1. LITTLE INTEREST OR PLEASURE IN DOING THINGS: 0
SUM OF ALL RESPONSES TO PHQ QUESTIONS 1-9: 0
2. FEELING DOWN, DEPRESSED OR HOPELESS: 0
SUM OF ALL RESPONSES TO PHQ9 QUESTIONS 1 & 2: 0

## 2021-02-03 ASSESSMENT — ENCOUNTER SYMPTOMS: SHORTNESS OF BREATH: 0

## 2021-02-03 NOTE — PROGRESS NOTES
SUBJECTIVE:  Patient ID: Jeaneth Rubio is an 80 y.o. male. HPI: Patient here today for the f/u of chronic problems-- see Problem List and associated comments. New issues or complaints include (also see Assessment for more details): Patient brought in today by his wife and nephew. The nephew lives in [facility]. They have noticed over the last 4 months that the patient has been slurring his words and they are concerned that he may have had another small stroke. Appetite good. He seems mentally clear when he does interact with him. He does sleep a lot more. No signs or symptoms of Covid during the pandemic. They have not yet scheduled for vaccinations. Review of Systems   Constitutional: Positive for fatigue. Negative for appetite change and fever. Respiratory: Negative for shortness of breath. Cardiovascular: Negative for chest pain. Neurological: Positive for speech difficulty. Psychiatric/Behavioral: Negative for confusion. OBJECTIVE:    /68 (Site: Right Upper Arm)   Temp 97.4 °F (36.3 °C)   Wt 140 lb (63.5 kg)   BMI 20.67 kg/m²      Physical Exam  Constitutional:       General: He is not in acute distress. Appearance: He is not ill-appearing. Comments: In wheelchair   Eyes:      General: No scleral icterus. Extraocular Movements: Extraocular movements intact. Cardiovascular:      Rate and Rhythm: Normal rate. Rhythm irregular. Heart sounds: No gallop. Pulmonary:      Effort: Pulmonary effort is normal. No respiratory distress. Breath sounds: Normal breath sounds. Musculoskeletal:      Right lower leg: No edema. Left lower leg: No edema. Skin:     Coloration: Skin is not pale. Neurological:      Cranial Nerves: Dysarthria (More mumbled than previous visits) present. Motor: Weakness present. No tremor or abnormal muscle tone.       Comments: Slight weakness left lower extremity-not now   Psychiatric:         Attention and Perception: Attention normal.         Mood and Affect: Mood is not anxious or depressed. Affect is not inappropriate. Speech: Speech is slurred. Behavior: Behavior is not agitated, slowed, withdrawn or hyperactive. Thought Content: Thought content is not delusional.         Cognition and Memory: Cognition is not impaired. ASSESSMENT:       Encounter Diagnoses   Name Primary?  Hyperlipidemia with target LDL less than 100 Yes    Elevated alkaline phosphatase level     Hypothyroidism, unspecified type     B12 deficiency     Essential hypertension     Multi-infarct state     [facility] diastolic [facility] failure [facility]     Dental disease     Chronic atrial fibrillation ([facility]        Multi-infarct state ([facility]  Family concerned he may have had a small stroke 4 months ago. He has been a little more difficult to understand. Appetite and interaction is okay, but he does sleep more. Check labs today and then may order CT or MRI. Also consult for PT OT and speech eval.    Hyperlipidemia with target LDL less than 100  Check nonfasting labs today-on Lipitor    Elevated alkaline phosphatase level  Noted on labs from last summer. Check isoenzymes. May need further evaluation. Chronic diastolic heart failure (HCC)  No overt symptoms of failure. Admittedly he is fairly inactive. Dental disease  Missing multiple teeth. He only has a few left.     Chronic atrial fibrillation (HCC)  Remains anticoagulated        PLAN:See ASSESSMENT for evaluation & PLAN     Orders Placed This Encounter   Procedures    CBC Auto Differential     Standing Status:   Future     Standing Expiration Date:   2/3/2022    Comprehensive Metabolic Panel     Standing Status:   Future     Standing Expiration Date:   2/3/2022    Lipid Panel     Standing Status:   Future     Standing Expiration Date:   2/3/2022     Order Specific Question:   Is Patient Fasting?/# of Hours     Answer:   yes - 8 hours    TSH without

## 2021-02-06 LAB
ALK PHOS OTHER CALC: 0 U/L
ALK PHOSPHATASE: 230 U/L (ref 40–120)
ALKALINE PHOSPHATASE BONE FRACTION: 21 U/L (ref 0–55)
ALKALINE PHOSPHATASE LIVER FRACTION: 209 U/L (ref 0–94)

## 2021-02-08 DIAGNOSIS — R74.8 ELEVATED ALKALINE PHOSPHATASE LEVEL: Primary | ICD-10-CM

## 2021-02-19 ENCOUNTER — TELEPHONE (OUTPATIENT)
Dept: INTERNAL MEDICINE CLINIC | Age: 86
End: 2021-02-19

## 2021-02-19 DIAGNOSIS — K21.9 GERD (GASTROESOPHAGEAL REFLUX DISEASE): ICD-10-CM

## 2021-02-19 DIAGNOSIS — E78.5 HYPERLIPIDEMIA WITH TARGET LDL LESS THAN 100: ICD-10-CM

## 2021-02-19 DIAGNOSIS — K59.09 CONSTIPATION, CHRONIC: ICD-10-CM

## 2021-02-19 RX ORDER — FUROSEMIDE 40 MG/1
TABLET ORAL
Qty: 135 TABLET | Refills: 1 | Status: SHIPPED | OUTPATIENT
Start: 2021-02-19

## 2021-02-19 RX ORDER — TAMSULOSIN HYDROCHLORIDE 0.4 MG/1
CAPSULE ORAL
Qty: 90 CAPSULE | Refills: 1 | Status: SHIPPED | OUTPATIENT
Start: 2021-02-19

## 2021-02-22 ENCOUNTER — APPOINTMENT (OUTPATIENT)
Dept: GENERAL RADIOLOGY | Age: 86
DRG: 884 | End: 2021-02-22
Payer: MEDICARE

## 2021-02-22 ENCOUNTER — HOSPITAL ENCOUNTER (INPATIENT)
Age: 86
LOS: 3 days | Discharge: NURSING, REHAB OR LONG-TERM CARE | DRG: 884 | End: 2021-02-25
Attending: EMERGENCY MEDICINE | Admitting: HOSPITALIST
Payer: MEDICARE

## 2021-02-22 ENCOUNTER — TELEPHONE (OUTPATIENT)
Dept: INTERNAL MEDICINE CLINIC | Age: 86
End: 2021-02-22

## 2021-02-22 DIAGNOSIS — R26.2 INABILITY TO WALK: ICD-10-CM

## 2021-02-22 DIAGNOSIS — R53.1 GENERAL WEAKNESS: Primary | ICD-10-CM

## 2021-02-22 PROBLEM — R53.83 MALAISE AND FATIGUE: Status: ACTIVE | Noted: 2021-02-22

## 2021-02-22 PROBLEM — R53.81 MALAISE AND FATIGUE: Status: ACTIVE | Noted: 2021-02-22

## 2021-02-22 LAB
ANION GAP SERPL CALCULATED.3IONS-SCNC: 8 MMOL/L (ref 3–16)
BASOPHILS ABSOLUTE: 0.1 K/UL (ref 0–0.2)
BASOPHILS RELATIVE PERCENT: 0.6 %
BUN BLDV-MCNC: 13 MG/DL (ref 7–20)
CALCIUM SERPL-MCNC: 9.3 MG/DL (ref 8.3–10.6)
CHLORIDE BLD-SCNC: 104 MMOL/L (ref 99–110)
CO2: 25 MMOL/L (ref 21–32)
CREAT SERPL-MCNC: 0.8 MG/DL (ref 0.8–1.3)
EKG ATRIAL RATE: 300 BPM
EKG DIAGNOSIS: NORMAL
EKG Q-T INTERVAL: 442 MS
EKG QRS DURATION: 122 MS
EKG QTC CALCULATION (BAZETT): 467 MS
EKG R AXIS: 66 DEGREES
EKG T AXIS: 47 DEGREES
EKG VENTRICULAR RATE: 67 BPM
EOSINOPHILS ABSOLUTE: 0.3 K/UL (ref 0–0.6)
EOSINOPHILS RELATIVE PERCENT: 2.4 %
GFR AFRICAN AMERICAN: >60
GFR NON-AFRICAN AMERICAN: >60
GLUCOSE BLD-MCNC: 106 MG/DL (ref 70–99)
HCT VFR BLD CALC: 50 % (ref 40.5–52.5)
HEMOGLOBIN: 15.9 G/DL (ref 13.5–17.5)
LYMPHOCYTES ABSOLUTE: 1.2 K/UL (ref 1–5.1)
LYMPHOCYTES RELATIVE PERCENT: 9.1 %
MCH RBC QN AUTO: 31.2 PG (ref 26–34)
MCHC RBC AUTO-ENTMCNC: 31.8 G/DL (ref 31–36)
MCV RBC AUTO: 98.1 FL (ref 80–100)
MONOCYTES ABSOLUTE: 1.2 K/UL (ref 0–1.3)
MONOCYTES RELATIVE PERCENT: 9 %
NEUTROPHILS ABSOLUTE: 10.1 K/UL (ref 1.7–7.7)
NEUTROPHILS RELATIVE PERCENT: 78.9 %
PDW BLD-RTO: 16.3 % (ref 12.4–15.4)
PLATELET # BLD: 225 K/UL (ref 135–450)
PMV BLD AUTO: 8.2 FL (ref 5–10.5)
POTASSIUM REFLEX MAGNESIUM: 4.4 MMOL/L (ref 3.5–5.1)
PRO-BNP: 1859 PG/ML (ref 0–449)
RBC # BLD: 5.1 M/UL (ref 4.2–5.9)
SODIUM BLD-SCNC: 137 MMOL/L (ref 136–145)
TROPONIN: <0.01 NG/ML
WBC # BLD: 12.8 K/UL (ref 4–11)

## 2021-02-22 PROCEDURE — 85025 COMPLETE CBC W/AUTO DIFF WBC: CPT

## 2021-02-22 PROCEDURE — 80048 BASIC METABOLIC PNL TOTAL CA: CPT

## 2021-02-22 PROCEDURE — 71045 X-RAY EXAM CHEST 1 VIEW: CPT

## 2021-02-22 PROCEDURE — G0378 HOSPITAL OBSERVATION PER HR: HCPCS

## 2021-02-22 PROCEDURE — 93005 ELECTROCARDIOGRAM TRACING: CPT | Performed by: PHYSICIAN ASSISTANT

## 2021-02-22 PROCEDURE — 83880 ASSAY OF NATRIURETIC PEPTIDE: CPT

## 2021-02-22 PROCEDURE — 6370000000 HC RX 637 (ALT 250 FOR IP): Performed by: STUDENT IN AN ORGANIZED HEALTH CARE EDUCATION/TRAINING PROGRAM

## 2021-02-22 PROCEDURE — 2580000003 HC RX 258: Performed by: STUDENT IN AN ORGANIZED HEALTH CARE EDUCATION/TRAINING PROGRAM

## 2021-02-22 PROCEDURE — 1200000000 HC SEMI PRIVATE

## 2021-02-22 PROCEDURE — 84484 ASSAY OF TROPONIN QUANT: CPT

## 2021-02-22 PROCEDURE — 99283 EMERGENCY DEPT VISIT LOW MDM: CPT

## 2021-02-22 RX ORDER — TRAMADOL HYDROCHLORIDE 50 MG/1
50 TABLET ORAL EVERY 6 HOURS PRN
COMMUNITY

## 2021-02-22 RX ORDER — SODIUM CHLORIDE 0.9 % (FLUSH) 0.9 %
10 SYRINGE (ML) INJECTION EVERY 12 HOURS SCHEDULED
Status: DISCONTINUED | OUTPATIENT
Start: 2021-02-22 | End: 2021-02-25 | Stop reason: HOSPADM

## 2021-02-22 RX ORDER — LANOLIN ALCOHOL/MO/W.PET/CERES
1000 CREAM (GRAM) TOPICAL DAILY
Status: DISCONTINUED | OUTPATIENT
Start: 2021-02-23 | End: 2021-02-25 | Stop reason: HOSPADM

## 2021-02-22 RX ORDER — ONDANSETRON 2 MG/ML
4 INJECTION INTRAMUSCULAR; INTRAVENOUS EVERY 6 HOURS PRN
Status: DISCONTINUED | OUTPATIENT
Start: 2021-02-22 | End: 2021-02-25 | Stop reason: HOSPADM

## 2021-02-22 RX ORDER — PROMETHAZINE HYDROCHLORIDE 12.5 MG/1
12.5 TABLET ORAL EVERY 6 HOURS PRN
Status: DISCONTINUED | OUTPATIENT
Start: 2021-02-22 | End: 2021-02-25 | Stop reason: HOSPADM

## 2021-02-22 RX ORDER — FINASTERIDE 5 MG/1
5 TABLET, FILM COATED ORAL DAILY
Status: DISCONTINUED | OUTPATIENT
Start: 2021-02-23 | End: 2021-02-25 | Stop reason: HOSPADM

## 2021-02-22 RX ORDER — SODIUM CHLORIDE 0.9 % (FLUSH) 0.9 %
10 SYRINGE (ML) INJECTION PRN
Status: DISCONTINUED | OUTPATIENT
Start: 2021-02-22 | End: 2021-02-25 | Stop reason: HOSPADM

## 2021-02-22 RX ORDER — TAMSULOSIN HYDROCHLORIDE 0.4 MG/1
0.4 CAPSULE ORAL NIGHTLY
Status: DISCONTINUED | OUTPATIENT
Start: 2021-02-22 | End: 2021-02-25 | Stop reason: HOSPADM

## 2021-02-22 RX ORDER — ACETAMINOPHEN 325 MG/1
650 TABLET ORAL EVERY 6 HOURS PRN
Status: DISCONTINUED | OUTPATIENT
Start: 2021-02-22 | End: 2021-02-25 | Stop reason: HOSPADM

## 2021-02-22 RX ORDER — SODIUM CHLORIDE 9 MG/ML
INJECTION, SOLUTION INTRAVENOUS CONTINUOUS
Status: ACTIVE | OUTPATIENT
Start: 2021-02-22 | End: 2021-02-23

## 2021-02-22 RX ORDER — ATORVASTATIN CALCIUM 80 MG/1
80 TABLET, FILM COATED ORAL DAILY
Status: DISCONTINUED | OUTPATIENT
Start: 2021-02-23 | End: 2021-02-25 | Stop reason: HOSPADM

## 2021-02-22 RX ORDER — ACETAMINOPHEN 650 MG/1
650 SUPPOSITORY RECTAL EVERY 6 HOURS PRN
Status: DISCONTINUED | OUTPATIENT
Start: 2021-02-22 | End: 2021-02-25 | Stop reason: HOSPADM

## 2021-02-22 RX ORDER — POLYETHYLENE GLYCOL 3350 17 G/17G
17 POWDER, FOR SOLUTION ORAL DAILY PRN
Status: DISCONTINUED | OUTPATIENT
Start: 2021-02-22 | End: 2021-02-25 | Stop reason: HOSPADM

## 2021-02-22 RX ADMIN — Medication 10 ML: at 23:32

## 2021-02-22 RX ADMIN — SODIUM CHLORIDE: 9 INJECTION, SOLUTION INTRAVENOUS at 23:34

## 2021-02-22 RX ADMIN — TAMSULOSIN HYDROCHLORIDE 0.4 MG: 0.4 CAPSULE ORAL at 23:28

## 2021-02-22 RX ADMIN — APIXABAN 2.5 MG: 2.5 TABLET, FILM COATED ORAL at 23:28

## 2021-02-22 ASSESSMENT — ENCOUNTER SYMPTOMS
COUGH: 0
VOMITING: 0
SORE THROAT: 0
ABDOMINAL PAIN: 0
RHINORRHEA: 0
SHORTNESS OF BREATH: 1
NAUSEA: 0

## 2021-02-22 ASSESSMENT — PAIN SCALES - GENERAL: PAINLEVEL_OUTOF10: 0

## 2021-02-22 NOTE — ED TRIAGE NOTES
Pt presents today for increasing weakness over the course of the last several days. Pt was up walking with PT today, when his legs were too weak to hold him. He did not fall, but was assisted to the floor. Pt is without complaints. EMS reports that the pt is well know to them, as they are frequently called for lift assists.

## 2021-02-22 NOTE — ED NOTES
Bed: A04-04  Expected date:   Expected time:   Means of arrival:   Comments:  Tanisha Gipson RN  02/22/21 5200

## 2021-02-22 NOTE — ED PROVIDER NOTES
[address] [place]          NURSE PRACTITIONER NOTE       Date of evaluation: 2/22/2021    ADDENDUM:      Care of this patient was assumed from [place], [place]. The patient was seen for Fatigue (increasing weakness over the last several days) and Leg Swelling (family reports that pts legs are more swollen than usual)  . The patient's initial evaluation and plan have been discussed with the prior provider who initially evaluated the patient. Nursing Notes, Past Medical Hx, Past Surgical Hx, Social Hx, Allergies, and Family Hx were all reviewed. Diagnostic Results     EKG   Controlled afib with a vent rate of 68bpm  No ST or T wave changes noted  See EMR    RADIOLOGY:  XR CHEST PORTABLE   Final Result   1. There are streaky bibasilar airspace opacities which are similar in comparison to July 14, 2020. This may reflect persistent scarring versus recurrent airspace disease. 2. Small right pleural effusion. Yuri Tam               LABS:   Results for orders placed or performed during the hospital encounter of 02/22/21   CBC auto differential   Result Value Ref Range    WBC 12.8 (H) 4.0 - 11.0 K/uL    RBC 5.10 4.20 - 5.90 M/uL    Hemoglobin 15.9 13.5 - 17.5 g/dL    Hematocrit 50.0 40.5 - 52.5 %    MCV 98.1 80.0 - 100.0 fL    MCH 31.2 26.0 - 34.0 pg    MCHC 31.8 31.0 - 36.0 g/dL    RDW 16.3 (H) 12.4 - 15.4 %    Platelets 595 479 - 240 K/uL    MPV 8.2 5.0 - 10.5 fL    Neutrophils % 78.9 %    Lymphocytes % 9.1 %    Monocytes % 9.0 %    Eosinophils % 2.4 %    Basophils % 0.6 %    Neutrophils Absolute 10.1 (H) 1.7 - 7.7 K/uL    Lymphocytes Absolute 1.2 1.0 - 5.1 K/uL    Monocytes Absolute 1.2 0.0 - 1.3 K/uL    Eosinophils Absolute 0.3 0.0 - 0.6 K/uL    Basophils Absolute 0.1 0.0 - 0.2 K/uL   Basic Metabolic Panel w/ Reflex to MG   Result Value Ref Range    Sodium 137 136 - 145 mmol/L    Potassium reflex Magnesium 4.4 3.5 - 5.1 mmol/L    Chloride 104 99 - 110 mmol/L    CO2 25 21 - 32 mmol/L Anion Gap 8 3 - 16    Glucose 106 (H) 70 - 99 mg/dL    BUN 13 7 - 20 mg/dL    CREATININE 0.8 0.8 - 1.3 mg/dL    GFR Non-African American >60 >60    GFR African American >60 >60    Calcium 9.3 8.3 - 10.6 mg/dL   Troponin (lab)   Result Value Ref Range    Troponin <0.01 <0.01 ng/mL   Brain Natriuretic Peptide   Result Value Ref Range    Pro-BNP 1,859 (H) 0 - 449 pg/mL   EKG 12 Lead   Result Value Ref Range    Ventricular Rate 67 BPM    Atrial Rate 300 BPM    QRS Duration 122 ms    Q-T Interval 442 ms    QTc Calculation (Bazett) 467 ms    R Axis 66 degrees    T Axis 47 degrees    Diagnosis       EKG performed in ER and to be interpreted by ER physician. Confirmed by MD, ER ([id number]),  Fernando Pollock [phone]) on 2/22/2021 4:27:39 PM       RECENT VITALS:  BP: 111/76, Temp: 97.8 °F (36.6 °C), Pulse: 77, Resp: 21, SpO2: 94 %       ED Course     The patient was given the following medications:  No orders of the defined types were placed in this encounter. CONSULTS:  IP CONSULT TO PRIMARY CARE PROVIDER  IP CONSULT TO RESIDENT INTERNAL MEDICINE  IP CONSULT TO CASE MANAGEMENT    MEDICAL DECISION MAKING / ASSESSMENT / Liliam Bundy is a 80 y.o. male who presented to the emergency department with a complaint of generalized weakness and lower extremity edema. He was turned over to me pending urinalysis results, as well as chest x-ray results. Unfortunately we are unable to obtain a urine sample due to noted phimosis. Chest x-ray does show a slight right-sided pleural effusion, no acute consolidation. Given the patient's generalized weakness, and inability to ambulate I do feel he warrants admission to the hospital for ongoing evaluation. Case was discussed with Dr. Drake Duran on-call for Dr. Kajal Antonio who was agreeable. Case was discussed with the AOD. This patient was also evaluated by the attending physician. All care plans were discussed and agreed upon. Clinical Impression     1.  General weakness

## 2021-02-22 NOTE — PROGRESS NOTES
Clinical Pharmacy Progress Note  Medication History     Encounter Date: 2/22/21    List of of current medications patient is taking is complete. Home Medication list in EPIC updated to reflect changes noted below. Source of information: Patient, Patient's family member, Barbara N Chintan Garzon (Ph # [phone])    Changes made to medication list: None    Other notes:   Patient's family member reports patient took all AM medications today prior to arrival at [facility]      Complete Home Medication List:  Current Outpatient Medications on File Prior to Encounter   Medication Sig    traMADol (ULTRAM) 50 MG tablet Take 50 mg by mouth every 6 hours as needed for Pain. furosemide (LASIX) 40 MG tablet TAKE 1 & 1/2 (ONE & ONE-HALF) TABLETS BY MOUTH ONCE DAILY IN THE MORNING    tamsulosin (FLOMAX) 0.4 MG capsule TAKE 1 CAPSULE BY MOUTH ONCE DAILY IN THE EVENING FOR PROSTATE AND URINATION    apixaban (ELIQUIS) 2.5 MG TABS tablet Take 1 tablet by mouth 2 times daily    finasteride (PROSCAR) 5 MG tablet Take 1 tablet by mouth daily    acetaminophen (TYLENOL) 325 MG tablet Take 2 tablets by mouth every 6 hours as needed for Pain    potassium chloride (KLOR-CON M) 10 MEQ extended release tablet Take 2 tablets by mouth daily    atorvastatin (LIPITOR) 80 MG tablet TAKE 1 TABLET BY MOUTH ONE TIME A DAY     vitamin B-12 (CYANOCOBALAMIN) 1000 MCG tablet Take OTC B12 2000 one time daily    omeprazole (PRILOSEC OTC) 20 MG tablet 1 daily to protect stomach.    polyethylene glycol (GLYCOLAX) powder Take as directed every other day for constipation       Please call with questions!     Thanh Barnett, Luis, [facility]  Mobile: [phone]  Main Pharmacy: [phone]

## 2021-02-22 NOTE — CARE COORDINATION
Referred to patient per family request.  Patient lives at home with elderly wife. They have private duty assistance 4 days a week for 2 hours at a time. Per nephew, patient is bed bound. They are interested in a hospital bed. Discussed possible SNF. Nephew prefers for patient to return home. Is agreeable to home care. Has had [facility] in past.  Nephew also interested in Visiting Physician. Information given. Case Management Assessment           Initial Evaluation                Date / Time of Evaluation: 2/22/2021 5:04 PM                 Assessment Completed by: Consuelo Granado    Patient Name: Mikaela Quiroz     YOB: 1927  Diagnosis: No admission diagnoses are documented for this encounter. Date / Time: 2/22/2021  3:09 PM    Patient Admission Status: ER    If patient is discharged prior to next notation, then this note serves as note for discharge by case management.      Current PCP: Linn Zapata MD  Clinic Patient: No    Chart Reviewed: Yes  Patient/ Family Interviewed: Yes    Initial assessment completed at bedside with: patient and nephew    Hospitalization in the last 30 days: No    Emergency Contacts:  Extended Emergency Contact Information  Primary Emergency Contact: Cassandra Kohli  Address: [address] [place] [address] [place] [address] Phone: [phone]  Mobile Phone: [phone]  Relation: Spouse    Advance Directives:   Code Status: Prior    Healthcare Power of : No      Financial  Payor: [facility] MEDICARE / Plan: [place] / Product Type: *No Product type* /     Pre-cert required for SNF: Yes    Pharmacy    [address] [place] [address] [phone] Ros Javier [phone]  [facility] Urmila [facility]  Phone: [phone] Fax: [phone]    [id number] - [address], [address] [facility]  [address] [place] [address] [address]  Phone: [phone] Fax: [phone]      Potential assistance Purchasing Medications:    Does Patient want to participate in local refill/ meds to beds program?:      Meds To Beds General Rules:  1. Can ONLY be done Monday- Friday between 8:30am-5pm  2. Prescription(s) must be in pharmacy by 3pm to be filled same day  3. Copy of patient's insurance/ prescription drug card and patient face sheet must be sent along with the prescription(s)  4. Cost of Rx cannot be added to hospital bill. If financial assistance is needed, please contact unit  or ;  or  CANNOT provide pharmacy voucher for patients co-pays  5.  Patients can then  the prescription on their way out of the hospital at discharge, or pharmacy can deliver to the bedside if staff is available. (payment due at time of pick-up or delivery - cash, check, or card accepted)     Able to afford home medications/ co-pay costs: Yes    ADLS  Support Systems:      PT AM-PAC:   /24  OT AM-PAC:   /24    [place]: home with wife  Steps:     Plans to RETURN to current housing: Yes  Barriers to RETURNING to current housing: weakness, medical complications    Home Care Information  Currently ACTIVE with [address]: No  Home Care Agency: Not Applicable    Currently ACTIVE with [facility]: No      Durable Medical Equipment  DME Provider:   Equipment: none noted    Home Oxygen and Respiratory Equipment  Has HOME OXYGEN prior to admission: No  [address]: Not Applicable      DISCHARGE PLAN:  Disposition: Home with [facility] [address]: TBD     Transportation PLAN for discharge: EMS transportation     Factors facilitating achievement of predicted outcomes: Family support, Caregiver support, Cooperative and Pleasant    Barriers to discharge: Cognitive deficit, Decreased endurance, Upper extremity weakness, Lower extremity weakness, Long standing deficits and Medical complications    Additional Case Management Notes: see above    The Plan for Transition of Care is related to the following treatment goals of No admission diagnoses are documented for this encounter. The Patient and/or patient representative Estela Pederson and his family were provided with a choice of provider and agrees with the discharge plan Not Indicated    Freedom of choice list was provided with basic dialogue that supports the patient's individualized plan of care/goals and shares the quality data associated with the providers. Not Indicated    Care Transition patient:  Yes    Ephriam Lesches, MSW, RENEE-S  [facility] [employer]  Case Management Department  Ph: [phone]

## 2021-02-22 NOTE — ED NOTES
Attempt to straight cath pt by this RN and NP student without success. Pt tolerated well.      Jenn Hull RN  02/22/21 1266

## 2021-02-22 NOTE — ED PROVIDER NOTES
[address] ENCOUNTER          PHYSICIAN ASSISTANT NOTE       Date of evaluation: 2/22/2021    Chief Complaint     Fatigue (increasing weakness over the last several days) and Leg Swelling (family reports that pts legs are more swollen than usual)      History of Present Illness     Curly Halsted is a 80 y.o. male, with a history of atrial fibrillation for which he is anticoagulated, hyperlipidemia, coronary artery disease and GERD, who presents to the ED with complaints of generalized weakness to the point where he was unable to walk today. The patient usually walks with a walker, has a physical therapist at visit 3 times a week in order to assist him in regaining strength in order to walk. Patient apparently was a full assist today and was unable to even stand on his own prompting his evaluation. He lives with his wife and is accompanied by his nephew today. Paramedics stated that they are called to the house frequently for lift assistance. Patient states he has noted a slight increase in his lower extremity swelling over the last week and a slight increase in his shortness of breath. Has had no chest pain. States his weakness has been generally progressing over the last few days. He had no fever, chills, nausea or vomiting. No changes in urinary or bowel habits. No cough or other URI type symptoms. Otherwise has no complaints. Review of Systems     Review of Systems   Constitutional: Negative for chills and fever. HENT: Negative for congestion, rhinorrhea and sore throat. Respiratory: Positive for shortness of breath. Negative for cough. Cardiovascular: Positive for leg swelling. Negative for chest pain and palpitations. Gastrointestinal: Negative for abdominal pain, nausea and vomiting. Genitourinary: Negative for decreased urine volume and difficulty urinating.    Musculoskeletal: Positive for gait problem (2/2 weakness, uses walker at baseline, full assist today). Negative for arthralgias and myalgias. Neurological: Positive for weakness (generalized). Negative for dizziness, light-headedness and headaches. All other systems reviewed and are negative. Past Medical, Surgical, Family, and Social History     He has a past medical history of Atrial fibrillation ([facility] [address] Carotid artery disease ([address] GERD (gastroesophageal reflux disease), Headaches due to old head injury, Heart murmur, systolic, Hyperlipidemia LDL goal < 100, Hypertension, Moderate aortic insufficiency, Multi-infarct state, PUD (peptic ulcer disease), Right leg weakness, and SBO (small bowel obstruction) ([address] He has a past surgical history that includes hernia repair; capsulotomy (Bilateral, 11/2011); skin biopsy (Right, 11/2012); Skin cancer excision (Right, 10/2010); vitrectomy (Right, 6/6/2007); Cataract removal with implant (Left, 10/6/2006); Colonoscopy (1/11/05); Coronary angioplasty with stent (11/2016); and Upper gastrointestinal endoscopy (N/A, 1/6/2020). His family history is not on file. He reports that he has never smoked. He has never used smokeless tobacco. He reports current alcohol use of about 1.0 standard drinks of alcohol per week. He reports that he does not use drugs. Medications     Previous Medications    ACETAMINOPHEN (TYLENOL) 325 MG TABLET    Take 2 tablets by mouth every 6 hours as needed for Pain    APIXABAN (ELIQUIS) 2.5 MG TABS TABLET    Take 1 tablet by mouth 2 times daily    ATORVASTATIN (LIPITOR) 80 MG TABLET    TAKE 1 TABLET BY MOUTH ONE TIME A DAY     FINASTERIDE (PROSCAR) 5 MG TABLET    Take 1 tablet by mouth daily    FUROSEMIDE (LASIX) 40 MG TABLET    TAKE 1 & 1/2 (ONE & ONE-HALF) TABLETS BY MOUTH ONCE DAILY IN THE MORNING    OMEPRAZOLE (PRILOSEC OTC) 20 MG TABLET    1 daily to protect stomach.     POLYETHYLENE GLYCOL (GLYCOLAX) POWDER    Take as directed every other day for constipation    POTASSIUM CHLORIDE (KLOR-CON M) 10 MEQ EXTENDED RELEASE deficit) present. Psychiatric:         Mood and Affect: Mood and affect normal.         Behavior: Behavior normal.         Diagnostic Results     EKG   Interpreted in conjunction with emergency department physician Latanya Armstrong MD  Rhythm: atrial fibrillation - controlled  Rate: normal  Axis: normal  Ectopy: none  Conduction: normal  ST Segments: no acute change  T Waves: no acute change  Q Waves:nonspecific  Clinical Impression: no acute changes  Comparison:  7/2020      RECENT VITALS:  BP: 128/70, Temp: 97.8 °F (36.6 °C), Pulse: 70, Resp: 17, SpO2: 99 %     ED Course     Nursing Notes, Past Medical Hx,Past Surgical Hx, Social Hx, Allergies, and Family Hx were reviewed. The patient was given the following medications:  No orders of the defined types were placed in this encounter. CONSULTS:  None    MEDICAL DECISION MAKING / ASSESSMENT / PLAN     Belkis Rosa is a 80 y.o. male presenting with generalized weakness to the point where he was unable to walk, was a full assist with the physical therapist in his home today requiring ambulance transport to the hospital.  He has noted increased swelling in his feet and ankles recently with a slight increase in shortness of breath, otherwise has no complaints. He is in A. fib, rate controlled, has 1+ edema to the ankles bilaterally, exam is otherwise unremarkable. He is accompanied by his nephew who states that he is otherwise at his baseline. IV access was established. Labs include an unremarkable renal panel and troponin. BNP is elevated at 1800. CBC results a white blood cell count of 13, it is otherwise unremarkable. At this time there is concern for possible infection given his leukocytosis. Chest x-ray and urinalysis are pending and will be turned over to the oncoming provider. Please see her addendum for diagnostic studies and additional management as indicated.   The patient will likely be admitted given that he is unable to ambulate safely in order to return home. This patient was also evaluated by the attending physician. All care plans were discussed and agreed upon. Clinical Impression     1. General weakness    2. Inability to walk        Disposition     PATIENT REFERRED TO:  No follow-up provider specified.     DISCHARGE MEDICATIONS:  New Prescriptions    No medications on file       DISPOSITION Decision To Admit 02/22/2021 04:38:29 PM     [facility], [place], operated by [facility]  02/22/21 [id number]

## 2021-02-23 LAB
ANION GAP SERPL CALCULATED.3IONS-SCNC: 9 MMOL/L (ref 3–16)
BASOPHILS ABSOLUTE: 0 K/UL (ref 0–0.2)
BASOPHILS RELATIVE PERCENT: 0.3 %
BUN BLDV-MCNC: 13 MG/DL (ref 7–20)
CALCIUM SERPL-MCNC: 9 MG/DL (ref 8.3–10.6)
CHLORIDE BLD-SCNC: 105 MMOL/L (ref 99–110)
CO2: 24 MMOL/L (ref 21–32)
CREAT SERPL-MCNC: 0.6 MG/DL (ref 0.8–1.3)
EOSINOPHILS ABSOLUTE: 0.1 K/UL (ref 0–0.6)
EOSINOPHILS RELATIVE PERCENT: 1 %
GFR AFRICAN AMERICAN: >60
GFR NON-AFRICAN AMERICAN: >60
GLUCOSE BLD-MCNC: 84 MG/DL (ref 70–99)
HCT VFR BLD CALC: 49.2 % (ref 40.5–52.5)
HEMOGLOBIN: 15.8 G/DL (ref 13.5–17.5)
LYMPHOCYTES ABSOLUTE: 1.5 K/UL (ref 1–5.1)
LYMPHOCYTES RELATIVE PERCENT: 12.4 %
MCH RBC QN AUTO: 31.4 PG (ref 26–34)
MCHC RBC AUTO-ENTMCNC: 32.2 G/DL (ref 31–36)
MCV RBC AUTO: 97.5 FL (ref 80–100)
MONOCYTES ABSOLUTE: 1 K/UL (ref 0–1.3)
MONOCYTES RELATIVE PERCENT: 8.4 %
NEUTROPHILS ABSOLUTE: 9.3 K/UL (ref 1.7–7.7)
NEUTROPHILS RELATIVE PERCENT: 77.9 %
PDW BLD-RTO: 15.9 % (ref 12.4–15.4)
PLATELET # BLD: 167 K/UL (ref 135–450)
PMV BLD AUTO: 8.1 FL (ref 5–10.5)
POTASSIUM REFLEX MAGNESIUM: 4.6 MMOL/L (ref 3.5–5.1)
RBC # BLD: 5.05 M/UL (ref 4.2–5.9)
SODIUM BLD-SCNC: 138 MMOL/L (ref 136–145)
WBC # BLD: 11.9 K/UL (ref 4–11)

## 2021-02-23 PROCEDURE — 93325 DOPPLER ECHO COLOR FLOW MAPG: CPT

## 2021-02-23 PROCEDURE — 97530 THERAPEUTIC ACTIVITIES: CPT

## 2021-02-23 PROCEDURE — 36415 COLL VENOUS BLD VENIPUNCTURE: CPT

## 2021-02-23 PROCEDURE — 97162 PT EVAL MOD COMPLEX 30 MIN: CPT

## 2021-02-23 PROCEDURE — 82306 VITAMIN D 25 HYDROXY: CPT

## 2021-02-23 PROCEDURE — 82746 ASSAY OF FOLIC ACID SERUM: CPT

## 2021-02-23 PROCEDURE — 97166 OT EVAL MOD COMPLEX 45 MIN: CPT

## 2021-02-23 PROCEDURE — 94761 N-INVAS EAR/PLS OXIMETRY MLT: CPT

## 2021-02-23 PROCEDURE — 92610 EVALUATE SWALLOWING FUNCTION: CPT

## 2021-02-23 PROCEDURE — 2580000003 HC RX 258: Performed by: STUDENT IN AN ORGANIZED HEALTH CARE EDUCATION/TRAINING PROGRAM

## 2021-02-23 PROCEDURE — 1200000000 HC SEMI PRIVATE

## 2021-02-23 PROCEDURE — G0378 HOSPITAL OBSERVATION PER HR: HCPCS

## 2021-02-23 PROCEDURE — 93320 DOPPLER ECHO COMPLETE: CPT

## 2021-02-23 PROCEDURE — 85025 COMPLETE CBC W/AUTO DIFF WBC: CPT

## 2021-02-23 PROCEDURE — 96374 THER/PROPH/DIAG INJ IV PUSH: CPT

## 2021-02-23 PROCEDURE — 92526 ORAL FUNCTION THERAPY: CPT

## 2021-02-23 PROCEDURE — 6360000002 HC RX W HCPCS: Performed by: STUDENT IN AN ORGANIZED HEALTH CARE EDUCATION/TRAINING PROGRAM

## 2021-02-23 PROCEDURE — 2700000000 HC OXYGEN THERAPY PER DAY

## 2021-02-23 PROCEDURE — 99222 1ST HOSP IP/OBS MODERATE 55: CPT | Performed by: HOSPITALIST

## 2021-02-23 PROCEDURE — 93308 TTE F-UP OR LMTD: CPT

## 2021-02-23 PROCEDURE — 80048 BASIC METABOLIC PNL TOTAL CA: CPT

## 2021-02-23 PROCEDURE — 82607 VITAMIN B-12: CPT

## 2021-02-23 PROCEDURE — 6370000000 HC RX 637 (ALT 250 FOR IP): Performed by: STUDENT IN AN ORGANIZED HEALTH CARE EDUCATION/TRAINING PROGRAM

## 2021-02-23 RX ADMIN — FUROSEMIDE 60 MG: 40 TABLET ORAL at 08:16

## 2021-02-23 RX ADMIN — ATORVASTATIN CALCIUM 80 MG: 80 TABLET, FILM COATED ORAL at 08:16

## 2021-02-23 RX ADMIN — APIXABAN 2.5 MG: 2.5 TABLET, FILM COATED ORAL at 08:16

## 2021-02-23 RX ADMIN — ONDANSETRON 4 MG: 2 INJECTION INTRAMUSCULAR; INTRAVENOUS at 10:55

## 2021-02-23 RX ADMIN — FINASTERIDE 5 MG: 5 TABLET, FILM COATED ORAL at 08:16

## 2021-02-23 RX ADMIN — Medication 10 ML: at 08:17

## 2021-02-23 RX ADMIN — CYANOCOBALAMIN TAB 1000 MCG 1000 MCG: 1000 TAB at 08:16

## 2021-02-23 RX ADMIN — TAMSULOSIN HYDROCHLORIDE 0.4 MG: 0.4 CAPSULE ORAL at 21:51

## 2021-02-23 RX ADMIN — APIXABAN 2.5 MG: 2.5 TABLET, FILM COATED ORAL at 21:51

## 2021-02-23 RX ADMIN — Medication 10 ML: at 21:52

## 2021-02-23 ASSESSMENT — PAIN SCALES - GENERAL
PAINLEVEL_OUTOF10: 0

## 2021-02-23 NOTE — PROGRESS NOTES
4 Eyes Admission Assessment     I agree as the admission nurse that 2 RN's have performed a thorough Head to Toe Skin Assessment on the patient. ALL assessment sites listed below have been assessed on admission. Areas assessed by both nurses: Tammy & Esha  [x]   Head, Face, and Ears   [x]   Shoulders, Back, and Chest  [x]   Arms, Elbows, and Hands   [x]   Coccyx, Sacrum, and Ischum  [x]   Legs, Feet, and Heels        Does the Patient have Skin Breakdown? Pt has redness to antecubital. Pt also has redness to groin and buttocks area - no open areas. A skin tear was noted to the left lower side.          Georges Prevention initiated:  Yes   Wound Care Orders initiated:  Yes      [id number] nurse consulted for Pressure Injury (Stage 3,4, Unstageable, DTI, NWPT, and Complex wounds):  No      Nurse 1 eSignature: Electronically signed by Bravo Osorio RN on 2/22/21 at 11:50 PM EST    **SHARE this note so that the co-signing nurse is able to place an eSignature**    Nurse 2 eSignature: Electronically signed by Marcia Souza RN on 2/22/21 at 11:51 PM EST

## 2021-02-23 NOTE — PROGRESS NOTES
Pt had uneventful day. VSS as charted. No complaints of pain. Pt's nephew POA was at bedside for couple hours and updated on pt's condition. States he would be agreeable to pt discharging to SNF.

## 2021-02-23 NOTE — PROGRESS NOTES
Physician Progress Note      PATIENT:               Nubia Espitia  CSN #:                  [id number]  :                       1927  ADMIT DATE:       2021 3:09 PM  [address] [place] DATE:  RESPONDING  PROVIDER #:        Steve Nugent MD          QUERY TEXT:    Pt admitted with generalized weakness with noted deconditioning. If possible,   please document in the progress notes and discharge summary if you are   evaluating and / or treating any of the following: The medical record reflects the following:  Risk Factors: Weakness, chronic dCHF, afib  Clinical Indicators: patient receives PT/OT at home 3 times a week. Noticed   that patient required full assist this morning and due to this was brought to   the ED for evaluation. Per PT/OT consult notes: Pt currently requring Ax2 for   bed mobility and transfers. Pt limited by decreased strength, balance and   endurance as well as cognition. Pt is well below his baseline of benefit from   further skilled PT to maximize safety and independence with functional   mobility. Will continue to follow.  -Pt decreased from baseline this date. Unable to obtain history and PLOF from   pt. Per chart pt able to walk with walker and has home PT 3x a week. Wife   stating they complete ADLs. Note stating paramedics often called for lift   assistance. Pt currently requires MaxA x2 for all mobility and transfers. MaxA   for sitting balance EOB, Max x2 squat pivot transfer to chair. MaxA for LB   dressing, CGA/Marlin with simple grooming tasks. Pt with significant weakness   overall. Pt would benefit from cont skilled OT services in acute care and at   d/c.  Will follow  Per progress notes: Generalized weakness likely 2/2 to deconditioning  Treatment: PT/OT, IV fluids and PO hydration, Vitamin B12, folate, vitamin D   pending, SLP, CW consult, orthostats  Options provided:  -- Weakness due to Age Related Physical Debility  -- Other - I will add my own diagnosis -- Disagree - Not applicable / Not valid  -- Disagree - Clinically unable to determine / Unknown  -- Refer to Clinical Documentation Reviewer    PROVIDER RESPONSE TEXT:    This patient has weakness due to age related physical debility.     Query created by: Marianne Oliveira on 2/23/2021 10:37 AM      Electronically signed by:  Charlene Tian MD 2/23/2021 11:28 AM

## 2021-02-23 NOTE — PROGRESS NOTES
Patient received to room 6303 from the ED. Patient admitted with generalized weakness. Patient alert and oriented to person upon arrival. Pt A/O x2 at baseline. VSS upon arrival. Patient oriented to room, staff, and call system. Educated on fall protocol and hourly rounding. Assessment as documented. Admission navigator complete. PIV in right forearm. Bed locked in low position. Patient informed to utilize call light with any needs. Pt did not verbalize any needs at this time. Call light within reach. Hourly rounding in place. Will continue to monitor.

## 2021-02-23 NOTE — PROGRESS NOTES
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  ONSET DATE: onset of dysphagia 2017- admitted 21    Recent Chest Xray 21  1. There are streaky bibasilar airspace opacities which are similar in comparison to 2020. This may reflect persistent scarring versus recurrent airspace disease. 2. Small right pleural effusion. .     Pt has had 4 MBSs in the past- most recent 20  Pt exhibiting mod pharyngeal phase dysphagia. Puree and nectar liquids were consumed with no penetration or aspiration, mild residue noted in pyriforms. Decreased laryngeal elevation and reduced epiglottal closure resulted in silent aspiration of thin liquids via straw, during the swallow. Intermittent penetration/ aspiration was identified with thin via cup, occurring after the swallow, spilling over from residue in pyriforms, again with no cough occurring. Pt with decreased penetration with smaller volume, however question if pt would be able to complete this consistently without cues. Head turn to right attempted with thin via cup, resulting in increased amount of pooling and residue with aspiration after the swallow.   Head turn to left did not result in increased residue, however bolus cont to result in mod-deep penetration that did not clear. Pt not able to produce effective volitional cough to clear. Pt demonstrated adequate mastication with solid texture, with mod residue again noted in pyriforms. Additional trials of nectar liquid cleared most of residue and did not result in penetration or aspiration. Recommend dysphagia II - minced/moist with nectar thick liquids      Date of Eval: 2/23/2021  Evaluating Therapist: Jeremy Major    Current Diet level:  Current Diet : Regular  Current Liquid Diet : Thin      Primary Complaint  Patient Complaint: pt unable to state    Pain:  Pt did not respond when questioned. Did not appear to be in any pain or distress     Reason for Referral  Curly Halsted was referred for a bedside swallow evaluation to assess the efficiency of his swallow function, identify signs and symptoms of aspiration and make recommendations regarding safe dietary consistencies, effective compensatory strategies, and safe eating environment. Impression  Pt has a long standing history of dysphagia with silent aspiration dating back to 2017. Pt has had 4 MBSs in the past with various diet recommendations (5/1/17-NPO, 5/4/17-Puree/NTL, 2/25/177-minced and moist with HTL, 1/7/20-minced and moist with NTL) Spoke with wife at great length prior to the bedside swallowing assessment. Initially wife reported thickening liquids at home but then said \"but I don't thicken his coffee because he gets after me\" then later stated \"I don't thicken his Coke because he doesn't like it thickened. Sometimes I'll sneak in a little thickener\" On this date, pt was very lethargic as he just finished a session with OT/PT and was sitting up in the chair. Pt was not able to answer any questions re: his diet, history of dysphagia or his wishes. Pt has limited dentition. Pt unable to follow commands for oral motor assessment.  Pt was analyzed with oatmeal, scrambled eggs, ice chips and water by cup and straw. Pt with opened posture mastication with solids with some tongue thrusting. Mastication is mildly prolonged with no anterior spillage or oral residue. With ice chip and first bite of oatmeal, pt produced a loud audible swallow and then produced a reactive cough. Pt with head slightly hyperextended so placed pillow behind head to bring head into neutral position. Pt then given additional trials of oatmeal, scrambled eggs and water by cup and straw. Pt with no s/s aspiration,but pt has long standing history of silent aspiration, so unable to fully determine tolerance at bedside. Given pt's age and non-compliance with thickened liquid recommendations in the past, recommend dysphagia III/soft and bite sized diet with thin liquids by single sips, no straws and medication in applesauce. Dysphagia Diagnosis: Mild to moderate oral stage dysphagia; Moderate pharyngeal stage dysphagia  Dysphagia Outcome Severity Scale: Level 3: Moderate dysphagia- Total assisstance, supervision or strategies. Two or more diet consistencies restricted     Treatment Plan  Requires SLP Intervention: Yes  Duration/Frequency of Treatment: 1-3x  D/C Recommendations: To be determined       Recommended Diet and Intervention  Diet Solids Recommendation: Dysphagia Soft and Bite-Sized (Dysphagia III)  Liquid Consistency Recommendation: Thin- NO STRAWS- single sips   Recommended Form of Meds: Meds in puree  Recommendations: Dysphagia treatment  Therapeutic Interventions: Diet tolerance monitoring;Patient/Family education    Compensatory Swallowing Strategies  Upright as possible for all oral intake; No straws;  Small bites/sips    Treatment/Goals  1- The pt and/or caregiver will demonstrate understanding of swallowing recommendations and concerns. 2/23- The pt was educated to purpose of the visit, concerns for aspiration, diet recommendation and swallowing strategies. The pt displayed no evidence of comprehension.   Also attempted to discuss with pt his wishes for diet level, if he would be agreeable to nectar thick liquids if recommended, but the pt did not respond. Spoke with pt's wife, Riya Coe, at great length prior to the assessment and left a message after the session. The wife reported she was thickening liquids at home but then stated \"but I don't thicken his coffee because he gets after me\"  Then later stated \"I don't thicken his Coke because he doesn't like it that way\" \"I don't thicken his water\". Through out the discussion, the wife demonstrated no comprehension of previous diet recommendations and frequently asked Randi Reno you seen him yet? \" Isatu Warren is he doing? \" \"Is he coming home to day? \". Also attempted to have conversation with wife about quality of life, given his advanced age,  that pt clearly does not not like the thickened liquids and will likely not be compliant in the future. Discussed that even though pt would benefit from repeat Modified Barium Swallow given his history, it is not indicated due to history of non-compliance. Wife had questionable comprehension. con't goal      2- the pt will utilize swallowing strategies to decrease risk for aspiration with MAX cues/assistance    General  Chart Reviewed: Yes  Behavior/Cognition: Lethargic  Respiratory Status: Room air  Communication Observation: (limited unintelligible utterances, likely due to lethargy)  Follows Directions: Simple(followed some commands)  Dentition: Some missing teeth  Patient Positioning: Upright in chair  Baseline Vocal Quality: Weak  Volitional Cough: Strong  Prior Dysphagia History: pt has a long standing history of dysphagia with silent aspiration dating back to 2017- spoke with wife-no consistently thickening liquids at home  Consistencies Administered: Dysphagia Pureed (Dysphagia I); Dysphagia Soft and Bite-Sized (Dysphagia III); Thin - cup; Thin - straw; Ice Chips           Vision/Hearing  Hearing  Hearing Exceptions: Hard of hearing/hearing

## 2021-02-23 NOTE — PROGRESS NOTES
Physical Therapy    Facility/Department: [address]  Initial Assessment/Treatment    NAME: Belkis Rosa  : 1927  MRN: [id number]    Date of Service: 2021    Discharge Recommendations:    Belkis Rosa scored a 6/24 on the AM-PAC short mobility form. Current research shows that an AM-PAC score of 17 or less is typically not associated with a discharge to the patient's home setting. Based on the patient's AM-PAC score and their current functional mobility deficits, it is recommended that the patient have 3-5 sessions per week of Physical Therapy at d/c to increase the patient's independence. Please see assessment section for further patient specific details. If patient discharges prior to next session this note will serve as a discharge summary. Please see below for the latest assessment towards goals. PT Equipment Recommendations  Equipment Needed: (defer)    Assessment   Body structures, Functions, Activity limitations: Decreased functional mobility ; Decreased cognition;Decreased strength;Decreased ROM; Decreased balance;Decreased safe awareness;Decreased endurance;Decreased posture  Assessment: Pt currently requring Ax2 for bed mobility and transfers. Pt limited by decreased strength, balance and endurance as well as cognition. Pt is well below his baseline of benefit from further skilled PT to maximize safety and independence with functional mobility. Will continue to follow. Treatment Diagnosis: Decreased functional mobility  Prognosis: Fair;Good  Decision Making: Medium Complexity  Patient Education: role of PT, use of call light, d/c planning - rec reinforcement  Barriers to Learning: cognition  REQUIRES PT FOLLOW UP: Yes  Activity Tolerance  Activity Tolerance: Patient limited by cognitive status; Patient limited by fatigue;Patient limited by endurance       Patient Diagnosis(es): The primary encounter diagnosis was General weakness.  A diagnosis of Inability to walk was also summary.      Nola Huerta, NAGIE, SANTIAGOT

## 2021-02-23 NOTE — PROGRESS NOTES
Attempted to call spouse to get a better idea of the patient. Phone was not picked up. Did not leave a voice mail. Currently trying to reach the children however unable to find their contact information. Already spoke to PCP.

## 2021-02-23 NOTE — PROGRESS NOTES
Occupational Therapy   Occupational Therapy Initial Assessment and Tx  Date: 2021   Patient Name: Miguel Roberts  MRN: [id number]     : 1927    Date of Service: 2021    Discharge Recommendations: Miguel Roberts scored a 9/24 on the AM-PAC ADL Inpatient form. Current research shows that an AM-PAC score of 17 or less is typically not associated with a discharge to the patient's home setting. Based on the patient's AM-PAC score and their current ADL deficits, it is recommended that the patient have 3-5 sessions per week of Occupational Therapy at d/c to increase the patient's independence. Please see assessment section for further patient specific details. If patient discharges prior to next session this note will serve as a discharge summary. Please see below for the latest assessment towards goals. OT Equipment Recommendations  Equipment Needed: (unable to assess this date)    Assessment   Performance deficits / Impairments: Decreased functional mobility ; Decreased ADL status; Decreased strength;Decreased cognition;Decreased endurance;Decreased balance;Decreased posture  Assessment: Pt decreased from baseline this date. Unable to obtain history and PLOF from pt. Per chart pt able to walk with walker and has home PT 3x a week. Wife stating they complete ADLs. Note stating paramedics often called for lift assistance. Pt currently requires MaxA x2 for all mobility and transfers. MaxA for sitting balance EOB, Max x2 squat pivot transfer to chair. MaxA for LB dressing, CGA/Marlin with simple grooming tasks. Pt with significant weakness overall. Pt would benefit from cont skilled OT services in acute care and at d/c. Will follow.   Treatment Diagnosis: decreased fxl mobility, transfers, ADL  Prognosis: Fair;Good  Decision Making: Medium Complexity  OT Education: OT Role;Plan of Care  Barriers to Learning: cognition  REQUIRES OT FOLLOW UP: Yes  Activity Tolerance  Activity Tolerance: Patient limited by fatigue;Treatment limited secondary to decreased cognition  Safety Devices  Safety Devices in place: Yes  Type of devices: All fall risk precautions in place;Gait belt;Patient at risk for falls;Call light within reach; Chair alarm in place; Left in chair;Nurse notified           Patient Diagnosis(es): The primary encounter diagnosis was General weakness. A diagnosis of Inability to walk was also pertinent to this visit. has a past medical history of Atrial fibrillation ([address] Carotid artery disease ([address] GERD (gastroesophageal reflux disease), Headaches due to old head injury, Heart murmur, systolic, Hyperlipidemia LDL goal < 100, Hypertension, Moderate aortic insufficiency, Multi-infarct state, PUD (peptic ulcer disease), Right leg weakness, and SBO (small bowel obstruction) ([address] has a past surgical history that includes hernia repair; capsulotomy (Bilateral, 11/2011); skin biopsy (Right, 11/2012); Skin cancer excision (Right, 10/2010); vitrectomy (Right, 6/6/2007); Cataract removal with implant (Left, 10/6/2006); Colonoscopy (1/11/05); Coronary angioplasty with stent (11/2016); and Upper gastrointestinal endoscopy (N/A, 1/6/2020). Treatment Diagnosis: decreased fxl mobility, transfers, ADL      Restrictions  Position Activity Restriction  Other position/activity restrictions: up with assist    Subjective   General  Pt in bed upon arrival, decreased awareness sliding down and to left in bed. Chart Reviewed: Yes  Patient assessed for rehabilitation services?: Yes  Additional Pertinent Hx: 77-year-old male past medical history A. fib on Eliquis hyperlipidemia CAD and GERD who presented to the ED with complaints of generalized weakness. Patient states he is usually able to ambulate using a walker and works with physical therapy 3 times a week. However patient was unable to walk today. Patient lives with his wife and they are typically able to complete most ADLs.   Paramedics state that they are often called to the house for lift assistance. Patient states he has had an increase in lower extremity swelling over the last week has an increase in his shortness of breath. Patient states today when he was walking with PT his legs were too weak to hold him. He denies falling. PT assisted him to the floor. Referring Practitioner: Richard Snyder DO  Diagnosis: Fatigue/Generalized Weakness  Vital Signs  Temp: 98.5 °F (36.9 °C)  Temp Source: Axillary  Pulse: 78  Heart Rate Source: Monitor  Resp: 18  BP: 114/74  BP Location: Left upper arm  Patient Position: Semi fowlers  Oxygen Therapy  SpO2: 96 %  Pulse Oximeter Device Mode: Intermittent  O2 Device: Nasal cannula  O2 Flow Rate (L/min): 2 L/min  Social/Functional History  Social/Functional History  Lives With: Spouse  Type of Home: House  Home Equipment: Rolling walker  Receives Help From: (PT 3x/wk)  Additional Comments: Pt is poor historian - unable to provide social history. Information found in chart: Patient states he is usually able to ambulate using a walker and works with physical therapy 3 times a week. However patient was unable to walk today. Patient lives with his wife and they are typically able to complete most ADLs. Paramedics state that they are often called to the house for lift assistance. Objective        Orientation  Overall Orientation Status: Impaired(pt able to state full name, did not verbalize any other orientation questions/answers)     Balance  Sitting Balance: Maximum assistance(fluctuating MaxA x1-2 to Marlin x1, ~5-7 min seated EOB)  Standing Balance: (MaxA x2 squat pivot to chair)  Standing Balance  Activity: squat pivot to chair from EOB  Toilet Transfers  Toilet Transfer: Unable to assess  ADL  Feeding:  Moderate assistance;Maximum assistance(dysphagia diet)  Grooming: Setup;Contact guard assistance(wipe face)  LE Dressing: Maximum assistance  Toileting: Unable to assess(comment)(brief donned)        Bed mobility  Supine to Sit: 30           Timed Code Treatment Minutes:  15  Total Treatment Minutes:  [facility], MOT, OTR/L

## 2021-02-23 NOTE — ED PROVIDER NOTES
ED Attending Attestation Note     Date of evaluation: 2/22/2021    This patient was seen by the advance practice provider. I have seen and examined the patient, agree with the workup, evaluation, management and diagnosis. The care plan has been discussed. I have reviewed the ECG and concur with the LEA's interpretation. My assessment reveals a 75-year-old male who presents with chief complaint of fatigue, leg swelling. Patient with acute on chronic fatigue, weakness to the point where he could not get up and walk today per home physical therapy. Patient with lower extremities that are swollen, slightly erythematous bilaterally. Patient is pleasantly confused, able to follow commands.      Roman Griffin MD  02/22/21 2036

## 2021-02-23 NOTE — H&P
Internal Medicine  PGY 1  History & Physical      CC fatigue    History Obtained From: Patient, nephew    HISTORY OF PRESENT ILLNESS:  Mr. Cris Campo is a 29-year-old male past medical history A. fib on Eliquis hyperlipidemia CAD and GERD who presented to the ED with complaints of generalized weakness. Patient states he is usually able to ambulate using a walker and works with physical therapy 3 times a week. However patient was unable to walk today. Patient lives with his wife and they are typically able to complete most ADLs. Paramedics state that they are often called to the house for lift assistance. Patient states he has had an increase in lower extremity swelling over the last week has an increase in his shortness of breath. Patient states today when he was walking with PT his legs were too weak to hold him. He denies falling. PT assisted him to the floor. ROS (-) fever chills chest pain abdominal pain nausea vomiting diarrhea dysuria (+) shortness of breath lower extremity edema fatigue weakness decreased p.o. intake    ED course:  Vitals: Temperature 97.8 RR 17 heart rate 70 blood pressure 128/70 SPO2 99% on room air  Labs: proBNP 1859 troponin less than 0.01 WBC 12.8  Imaging: Chest x-ray There are streaky bibasilar airspace opacities which are similar in comparison to July 14, 2020. This may reflect persistent scarring versus recurrent airspace disease. Small right pleural effusion. EKG: Atrial fibrillation, controlled, normal rate, no acute change in ST segments. Patient will be admitted for generalized weakness and dispo planning.       Past Medical History:        Diagnosis Date    Atrial fibrillation ([facility] [address] 05/2016    Carotid artery disease (HCC)     R>L    GERD (gastroesophageal reflux disease) 2/21/2013    Headaches due to old head injury 2010    Heart murmur, systolic 4/3/6941    9/9/89 Echo at [facility] [place]: Normal left ventricle size and systolic function with an estimated performed by Brenda Saleh MD at [address] [facility] Right 6/6/2007    with membrane peel - eye [facility] - Dr Ynes Nova   ·     No current facility-administered medications on file prior to encounter. Current Outpatient Medications on File Prior to Encounter   Medication Sig Dispense Refill    traMADol (ULTRAM) 50 MG tablet Take 50 mg by mouth every 6 hours as needed for Pain.  furosemide (LASIX) 40 MG tablet TAKE 1 & 1/2 (ONE & ONE-HALF) TABLETS BY MOUTH ONCE DAILY IN THE MORNING 135 tablet 1    tamsulosin (FLOMAX) 0.4 MG capsule TAKE 1 CAPSULE BY MOUTH ONCE DAILY IN THE EVENING FOR PROSTATE AND URINATION 90 capsule 1    apixaban (ELIQUIS) 2.5 MG TABS tablet Take 1 tablet by mouth 2 times daily 60 tablet 5    finasteride (PROSCAR) 5 MG tablet Take 1 tablet by mouth daily 90 tablet 3    acetaminophen (TYLENOL) 325 MG tablet Take 2 tablets by mouth every 6 hours as needed for Pain 120 tablet 0    potassium chloride (KLOR-CON M) 10 MEQ extended release tablet Take 2 tablets by mouth daily 180 tablet 3    atorvastatin (LIPITOR) 80 MG tablet TAKE 1 TABLET BY MOUTH ONE TIME A DAY  90 tablet 3    vitamin B-12 (CYANOCOBALAMIN) 1000 MCG tablet Take OTC B12 or similar B-complex vitamins 9bl=6304 mcg a day for neurological health and as a natural energy booster (read a bottle of 5 Hour Energy or diet Red Bull). High B12 levels are proven to help prevent dementia & neuropathy. (Patient taking differently: Take 1,000 mcg by mouth daily Take OTC B12 or similar B-complex vitamins 4kk=7268 mcg a day for neurological health and as a natural energy booster (read a bottle of 5 Hour Energy or diet Red Bull). High B12 levels are proven to help prevent dementia & neuropathy. ) 180 tablet 12    omeprazole (PRILOSEC OTC) 20 MG tablet 1 daily to protect stomach.  90 tablet 12    polyethylene glycol (GLYCOLAX) powder Take as directed every other day for constipation 1 Bottle 12       Allergies:  Aspirin    Social History:   · TOBACCO:   reports that he has never smoked. He has never used smokeless tobacco.  · ETOH:   reports current alcohol use of about 1.0 standard drinks of alcohol per week. · DRUGS :   · Patient currently lives at home with elderly wife. ·   Family History:   · History reviewed. No pertinent family history. ROS: A 10 point review of systems was conducted, significant findings as noted in HPI. Physical Exam  Vitals signs reviewed. Constitutional:       General: He is not in acute distress. Appearance: He is normal weight. He is not toxic-appearing. HENT:      Head: Normocephalic. Mouth/Throat:      Mouth: Mucous membranes are dry. Eyes:      Extraocular Movements: Extraocular movements intact. Cardiovascular:      Rate and Rhythm: Normal rate. Rhythm irregular. Pulses: Normal pulses. Pulmonary:      Effort: Pulmonary effort is normal. No respiratory distress. Breath sounds: Normal breath sounds. No wheezing. Abdominal:      General: Bowel sounds are normal. There is no distension. Tenderness: There is no abdominal tenderness. Musculoskeletal:      Right lower leg: Edema present. Left lower leg: Edema present. Comments: 2+ pitting edema   Neurological:      Motor: Weakness present. Comments: Oriented x2  3/5 bl UE  2/5 bl LE            Vitals:    02/22/21 1900   BP: 127/65   Pulse: 72   Resp: 17   Temp:    SpO2: 97%       DATA:    Labs:  CBC:   Recent Labs     02/22/21  1545   WBC 12.8*   HGB 15.9   HCT 50.0          BMP:   Recent Labs     02/22/21  1545      K 4.4      CO2 25   BUN 13   CREATININE 0.8   GLUCOSE 106*     LFT's: No results for input(s): AST, ALT, ALB, BILITOT, ALKPHOS in the last 72 hours. Troponin:   Recent Labs     02/22/21  1545   TROPONINI <0.01         XR CHEST PORTABLE   Final Result   1. There are streaky bibasilar airspace opacities which are similar in comparison to July 14, 2020.  This may reflect persistent scarring versus recurrent airspace disease. 2. Small right pleural effusion. .                  ASSESSMENT AND PLAN:    Generalized weakness  Patient presents with increasing weakness for the past 3 days. Works with PT at home. Unable to stand on his feet today. Endorses decreased p.o. intake. - IVF and PO hydration  - Vitamin B12, folate, vitamin D pending  - SLP, PT/OT, CW consulted  - Orthostats    Bilateral Lower extremity pitting edema  On presentation patient's lower extremities with 2+ pitting edema. Up to the knee. Other signs of fluid overload not present. Negative JVD crackles S3.  proBNP 1859. Does not seem to be in heart failure exacerbation.  - Diuresis as below    Heart failure with preserved EF, not in acute exacerbation  Left ventricular ejection fraction 71% per stress test in October 2016. Patient takes 55 mg Lasix daily  - Daily weights  - Keep K greater than 4 and mag greater than 2  - Is and Os  - Continue home Lasix    Chronic Atrial fibrillation - continue home Eliquis  Hyperlipidemia - continue home Lipitor  BPH - continue home finasteride, Flomax       Will discuss with attending physician Dr. Mike Clinton Status:Full code  FEN: general, low sodium  PPX: eliquis  DISPO: ROXANNE Mujica DO  2/22/2021,  7:58 PM    Addendum to Resident H& P/Progress note:  I have personally seen,examined and evaluated the patient.  I have reviewed the current history, physical findings, labs and assessment and plan and agree with note as documented by resident MD ( Rosa Mendoza)  We will order echocardiogram.    John Brown MD, Clif Dickinson

## 2021-02-23 NOTE — PROGRESS NOTES
Internal Medicine  PGY 1  Progress note    CC fatigue    History Obtained From: Patient, nephew    Interval hx:   Johnanna Level. Vitals stable, serum elec wnl. The pt will respond to questions appropriately with 2-3 word answers however he is hard of hearing. I spoke to patient's PCP to get an idea of his baseline and the patient in his mental status has been declining progressively over the past year. 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 Stable appearing small intraparenchymal hematoma in the left superior frontal gyrus on axial image 46, series 3.       2. Encephalomalacia in the right frontal lobe and bilateral occipital lobe territory is consistent with remote infarcts.       3. Mild/moderate white matter hypoattenuation favoring microvascular ischemic changes. Remote cerebellar lacunar infarcts. Arteriosclerosis. We may get a repeat CT head if his mental status appears to worsen. PTOT pending  Will request assistance from case management  Echo pending      HISTORY OF PRESENT ILLNESS:  Mr. Reji Banda is a 80-year-old male past medical history A. fib on Eliquis hyperlipidemia CAD and GERD who presented to the ED with complaints of generalized weakness. Patient states he is usually able to ambulate using a walker and works with physical therapy 3 times a week. However patient was unable to walk today. Patient lives with his wife and they are typically able to complete most ADLs. Paramedics state that they are often called to the house for lift assistance. Patient states he has had an increase in lower extremity swelling over the last week has an increase in his shortness of breath. Patient states today when he was walking with PT his legs were too weak to hold him. He denies falling. PT assisted him to the floor.     ROS (-) fever chills chest pain abdominal pain nausea vomiting diarrhea dysuria (+) shortness of breath lower extremity edema fatigue weakness decreased p.o. intake    ED course:  Vitals: Temperature 97.8 RR 17 heart rate 70 blood pressure 128/70 SPO2 99% on room air  Labs: proBNP 1859 troponin less than 0.01 WBC 12.8  Imaging: Chest x-ray There are streaky bibasilar airspace opacities which are similar in comparison to July 14, 2020. This may reflect persistent scarring versus recurrent airspace disease. Small right pleural effusion. EKG: Atrial fibrillation, controlled, normal rate, no acute change in ST segments. Patient will be admitted for generalized weakness and dispo planning. Past Medical History:        Diagnosis Date    [address] fibrillation ([facility] [address] 05/2016    Carotid artery disease (HCC)     R>L    GERD (gastroesophageal reflux disease) 2/21/2013    Headaches due to old head injury 2010    Heart murmur, systolic 3/7/8505    8/4/53 Echo at [facility] [place]: Normal left ventricle size and systolic function with an estimated ejection fraction of 55%. Concentric left ventricular hypertrophy is present. No regional wall motion abnormalities are seen. There is reversal of E/A inflow velocities across the mitral valve suggesting impaired left ventricular relaxation. Aortic valve appears sclerotic but opens adequately. Moderate a    Hyperlipidemia LDL goal < 100 2/21/2013    Hypertension     Moderate aortic insufficiency 1/9/2014 1/9/14 Echo at [facility] [place]: Normal left ventricle size and systolic function with an estimated ejection fraction of 55%. Concentric left ventricular hypertrophy is present. No regional wall motion abnormalities are seen. There is reversal of E/A inflow velocities across the mitral valve suggesting impaired left ventricular relaxation. Aortic valve appears sclerotic but opens adequately.  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 High B12 levels are proven to help prevent dementia & neuropathy. (Patient taking differently: Take 1,000 mcg by mouth daily Take OTC B12 or similar B-complex vitamins 8ef=4819 mcg a day for neurological health and as a natural energy booster (read a bottle of 5 Hour Energy or diet Red Bull). High B12 levels are proven to help prevent dementia & neuropathy.)  omeprazole (PRILOSEC OTC) 20 MG tablet, 1 daily to protect stomach.  polyethylene glycol (GLYCOLAX) powder, Take as directed every other day for constipation    Allergies:  Aspirin    Social History:   · TOBACCO:   reports that he has never smoked. He has never used smokeless tobacco.  · ETOH:   reports current alcohol use of about 1.0 standard drinks of alcohol per week. · DRUGS :   · Patient currently lives at home with elderly wife. ·   Family History:   History reviewed. No pertinent family history. Physical Exam  Vitals signs reviewed. Constitutional:       General: He is not in acute distress. Appearance: He is normal weight. He is not toxic-appearing. HENT:      Head: Normocephalic. Mouth/Throat:      Mouth: Mucous membranes are dry. Eyes:      Extraocular Movements: Extraocular movements intact. Cardiovascular:      Rate and Rhythm: Normal rate. Rhythm irregular. Pulses: Normal pulses. Pulmonary:      Effort: Pulmonary effort is normal. No respiratory distress. Breath sounds: Normal breath sounds. No wheezing. Abdominal:      General: Bowel sounds are normal. There is no distension. Tenderness: There is no abdominal tenderness. Musculoskeletal:      Right lower leg: No edema. Left lower leg: No edema. Neurological:      Motor: Weakness present.       Comments: Oriented x2  3/5 bl UE  2/5 bl LE        Vitals:    02/23/21 0804   BP:    Pulse:    Resp:    Temp:    SpO2: 96%     DATA    Labs:  CBC:   Recent Labs 02/22/21  1545 02/23/21  0523   WBC 12.8* 11.9*   HGB 15.9 15.8   HCT 50.0 49.2    167       BMP:   Recent Labs     02/22/21  1545 02/23/21  0523    138   K 4.4 4.6    105   CO2 25 24   BUN 13 13   CREATININE 0.8 0.6*   GLUCOSE 106* 84     Troponin:   Recent Labs     02/22/21  1545   TROPONINI <0.01       XR CHEST PORTABLE   Final Result   1. There are streaky bibasilar airspace opacities which are similar in comparison to July 14, 2020. This may reflect persistent scarring versus recurrent airspace disease. 2. Small right pleural effusion. .              ASSESSMENT AND PLAN:    Generalized weakness likely 2/2 to age-related physical debility  Patient presents with increasing weakness for the past 3 days. Works with PT at home. Unable to stand on his feet today. Endorses decreased p.o. intake. - IVF and PO hydration  - Vitamin B12, folate, vitamin D pending  - SLP, PT/OT, CW consulted  - Orthostats    Lower extremity pitting edema  On presentation patient's lower extremities with 2+ pitting edema. Up to the knee. Other signs of fluid overload not present. Most likely from venous insufficiency  -No swelling this AM    Heart failure with preserved EF, not in acute exacerbation  Left ventricular ejection fraction 71% per stress test in October 2016. Patient takes 55 mg Lasix daily  - Daily weights  - Keep K greater than 4 and mag greater than 2  - Is and Os  - Continue home Lasix    Atrial fibrillation - continue home Eliquis  Hyperlipidemia - continue home Lipitor  BPH - continue home finasteride, Flomax       Will discuss with attending physician Dr. Dario Brock Status:Full code  FEN: general, low sodium  PPX: eliquis  DISPO: Sunshine Knutson MD  2/23/2021,  10:08 AM    Addendum to Resident H& P/Progress note:  I have personally seen,examined and evaluated the patient.  I have reviewed the current history, physical findings, labs and assessment and plan and agree with note as documented by resident MD ( [facility])      Brigid Pereira MD, [address]

## 2021-02-24 LAB
ANION GAP SERPL CALCULATED.3IONS-SCNC: 6 MMOL/L (ref 3–16)
BASOPHILS ABSOLUTE: 0 K/UL (ref 0–0.2)
BASOPHILS RELATIVE PERCENT: 0.4 %
BUN BLDV-MCNC: 16 MG/DL (ref 7–20)
CALCIUM SERPL-MCNC: 9.1 MG/DL (ref 8.3–10.6)
CHLORIDE BLD-SCNC: 103 MMOL/L (ref 99–110)
CO2: 29 MMOL/L (ref 21–32)
CREAT SERPL-MCNC: 0.7 MG/DL (ref 0.8–1.3)
EOSINOPHILS ABSOLUTE: 0.2 K/UL (ref 0–0.6)
EOSINOPHILS RELATIVE PERCENT: 1.9 %
FOLATE: 10.19 NG/ML (ref 4.78–24.2)
GFR AFRICAN AMERICAN: >60
GFR NON-AFRICAN AMERICAN: >60
GLUCOSE BLD-MCNC: 90 MG/DL (ref 70–99)
HCT VFR BLD CALC: 44.9 % (ref 40.5–52.5)
HEMOGLOBIN: 14.7 G/DL (ref 13.5–17.5)
LYMPHOCYTES ABSOLUTE: 1.1 K/UL (ref 1–5.1)
LYMPHOCYTES RELATIVE PERCENT: 11.8 %
MCH RBC QN AUTO: 31.6 PG (ref 26–34)
MCHC RBC AUTO-ENTMCNC: 32.7 G/DL (ref 31–36)
MCV RBC AUTO: 96.8 FL (ref 80–100)
MONOCYTES ABSOLUTE: 0.8 K/UL (ref 0–1.3)
MONOCYTES RELATIVE PERCENT: 8.9 %
NEUTROPHILS ABSOLUTE: 7 K/UL (ref 1.7–7.7)
NEUTROPHILS RELATIVE PERCENT: 77 %
PDW BLD-RTO: 15.8 % (ref 12.4–15.4)
PLATELET # BLD: 190 K/UL (ref 135–450)
PMV BLD AUTO: 8.1 FL (ref 5–10.5)
POTASSIUM REFLEX MAGNESIUM: 4.3 MMOL/L (ref 3.5–5.1)
RBC # BLD: 4.64 M/UL (ref 4.2–5.9)
SARS-COV-2, NAAT: NOT DETECTED
SODIUM BLD-SCNC: 138 MMOL/L (ref 136–145)
VITAMIN B-12: 1370 PG/ML (ref 211–911)
VITAMIN D 25-HYDROXY: 52.1 NG/ML
WBC # BLD: 9 K/UL (ref 4–11)

## 2021-02-24 PROCEDURE — 99232 SBSQ HOSP IP/OBS MODERATE 35: CPT | Performed by: HOSPITALIST

## 2021-02-24 PROCEDURE — 80048 BASIC METABOLIC PNL TOTAL CA: CPT

## 2021-02-24 PROCEDURE — 87635 SARS-COV-2 COVID-19 AMP PRB: CPT

## 2021-02-24 PROCEDURE — 2580000003 HC RX 258: Performed by: STUDENT IN AN ORGANIZED HEALTH CARE EDUCATION/TRAINING PROGRAM

## 2021-02-24 PROCEDURE — 85025 COMPLETE CBC W/AUTO DIFF WBC: CPT

## 2021-02-24 PROCEDURE — 97110 THERAPEUTIC EXERCISES: CPT

## 2021-02-24 PROCEDURE — 92526 ORAL FUNCTION THERAPY: CPT

## 2021-02-24 PROCEDURE — 97530 THERAPEUTIC ACTIVITIES: CPT

## 2021-02-24 PROCEDURE — 6370000000 HC RX 637 (ALT 250 FOR IP): Performed by: STUDENT IN AN ORGANIZED HEALTH CARE EDUCATION/TRAINING PROGRAM

## 2021-02-24 PROCEDURE — 1200000000 HC SEMI PRIVATE

## 2021-02-24 PROCEDURE — G0378 HOSPITAL OBSERVATION PER HR: HCPCS

## 2021-02-24 PROCEDURE — 36415 COLL VENOUS BLD VENIPUNCTURE: CPT

## 2021-02-24 RX ADMIN — APIXABAN 2.5 MG: 2.5 TABLET, FILM COATED ORAL at 22:46

## 2021-02-24 RX ADMIN — FUROSEMIDE 60 MG: 40 TABLET ORAL at 10:09

## 2021-02-24 RX ADMIN — ATORVASTATIN CALCIUM 80 MG: 80 TABLET, FILM COATED ORAL at 10:09

## 2021-02-24 RX ADMIN — TAMSULOSIN HYDROCHLORIDE 0.4 MG: 0.4 CAPSULE ORAL at 22:47

## 2021-02-24 RX ADMIN — APIXABAN 2.5 MG: 2.5 TABLET, FILM COATED ORAL at 10:09

## 2021-02-24 RX ADMIN — FINASTERIDE 5 MG: 5 TABLET, FILM COATED ORAL at 10:09

## 2021-02-24 RX ADMIN — Medication 10 ML: at 10:10

## 2021-02-24 RX ADMIN — CYANOCOBALAMIN TAB 1000 MCG 1000 MCG: 1000 TAB at 10:09

## 2021-02-24 RX ADMIN — Medication 10 ML: at 22:46

## 2021-02-24 ASSESSMENT — PAIN SCALES - GENERAL
PAINLEVEL_OUTOF10: 0

## 2021-02-24 NOTE — CARE COORDINATION
Spoke to Tarik, wife of patient, and she really wants patient to come home. Let patient's wife know that [facility] will not take case any longer and that they feel patient should have 24/7 care. Spoke further about SNF and wife does not want patient to go anywhere. Would like a hospital bed and will need a [employer]. Patient cannot get a [employer] for home use. Not available for home from any DME. Spoke to [address] [place] [address], p[erfectserved Dr. Luis Betancourt with criteria to order a hospital bed and to get [facility] order. Patient's wife has a woman who comes in every day from 3pm to 5pm to clean patient and is private paid each week. Wife, Tarik, states that she wants Bola Johnson, nephew, to make all decisions and that this CM should call him. CM will continue to follow patient until discharge. Electronically signed by Jensen Kwong RN on 2/24/2021 at 11:02 AM     Spoke to nephew at length and nephew wants patient to go back todd Arshad. Vasile was at  previously and he spoke to admission today Sandy Andrade). Then, if possible, he wants patient to go home with hospital bed and [facility] and [address] [facility]. To clarify, patient lives with wife in a one story home with no steps to enter. Wife, Tarik, has deferred all decisionsto nephew, Bola Johnson. Perfectserved. Luis Betancourt with update. Called [facility] and they are needing idea when patient can be discharged. Perfectserved Dr. Stevie Arroyo (resident working with Dr. Luis Betancourt) since Dr. Luis Betancourt is in clinic to find out planned day for discharge. CM will continue to follow patient until discharge. Electronically signed by Jensen Kwong RN on 2/24/2021 at 11:55 AM      [facility] unable to take patient. Last time patient at , was for respite and out of pocket. [facility] states they are out of network [employer]. Reviewed in network facilities with nephew and choices are 1) [facility] 2) [address] [place] [address]. Ordered COVID PCR for SNF.  Patient has not had COVID test since July 2020. Faxed referrals to both. CM will continue to follow patient until discharge. Electronically signed by Christina Sigala RN on 2/24/2021 at 1:07 PM     Maxi Landers ([phone]), [facility] admissions, can take patient when precert comes back. This CM will call in a.m. to see what progress is made re: precert and coordinate with them if we can set up tentative transport to [facility]. Called nephew, Shaila Aguilar, to let him know.   Electronically signed by Christina Sigala RN on 2/24/2021 at 5:11 PM

## 2021-02-24 NOTE — PROGRESS NOTES
also pertinent to this visit. has a past medical history of Atrial fibrillation ([address] Carotid artery disease ([address] GERD (gastroesophageal reflux disease), Headaches due to old head injury, Heart murmur, systolic, Hyperlipidemia LDL goal < 100, Hypertension, Moderate aortic insufficiency, Multi-infarct state, PUD (peptic ulcer disease), Right leg weakness, and SBO (small bowel obstruction) ([address] has a past surgical history that includes hernia repair; capsulotomy (Bilateral, 11/2011); skin biopsy (Right, 11/2012); Skin cancer excision (Right, 10/2010); vitrectomy (Right, 6/6/2007); Cataract removal with implant (Left, 10/6/2006); Colonoscopy (1/11/05); Coronary angioplasty with stent (11/2016); and Upper gastrointestinal endoscopy (N/A, 1/6/2020). Restrictions  Position Activity Restriction  Other position/activity restrictions: up with assist  Subjective   General  Chart Reviewed: Yes  Additional Pertinent Hx: 58-year-old male past medical history A. fib on Eliquis hyperlipidemia CAD and GERD who presented to the ED with complaints of generalized weakness. Family / Caregiver Present: No  Referring Practitioner: Nury Singh DO  Subjective  Subjective: Pt found supine in bed upon arrival, denying pain and agreeable to therapy. Orientation  Orientation  Overall Orientation Status: Impaired  Orientation Level: Oriented to person  Objective   Bed mobility  Supine to Sit: Maximum assistance  Transfers  Sit to Stand: 2 Person Assistance(Max + min A from recliner to stedy)  Stand to sit: Maximum Assistance(from stance in stedy to recliner)  Bed to Chair: 2 Person Assistance(Max A + mod A from EOB to recliner via squat pivot)        Balance  Posture: Poor  Sitting - Static: Fair(CGA)  Sitting - Dynamic: Fair;Poor  Standing - Static: Fair;Poor  Comments: Pt stood in stedy ~45 seconds with max Ax1 for replacement of brief and pericare 2/2 urinary incontinence.   Exercises  Comments: AP, seated CHRIS thorne, 2x10 BLE         AM-PAC Score  AM-PAC Inpatient Mobility Raw Score : 6 (02/23/21 0911)  AM-PAC Inpatient T-Scale Score : 23.55 (02/23/21 0911)  Mobility Inpatient CMS 0-100% Score: 100 (02/23/21 0911)  Mobility Inpatient CMS G-Code Modifier : CN (02/23/21 0911)          Goals  Short term goals  Time Frame for Short term goals: d/c  Short term goal 1: sup<>sit min Ax1 ongoing  Short term goal 2: bed<>chair min Ax1 ongoing  Short term goal 3: sit<>stand mod Ax1 ongoing  Patient Goals   Patient goals : none stated    Plan    Plan  Times per week: 2-5  Times per day: Daily  Current Treatment Recommendations: Strengthening, Balance Training, Gait Training, Functional Mobility Training, Endurance Training, Transfer Training, Safety Education & Training, Home Exercise Program  Safety Devices  Type of devices: Gait belt, Nurse notified, Chair alarm in place, Call light within reach, Left in chair     Therapy Time   Individual Concurrent Group Co-treatment   Time In 1424         Time Out 1453         Minutes 29           Timed Code Treatment Minutes:  29    Total Treatment Minutes:  29    If the patient is discharged before the next treatment session, this note will serve as the discharge summary.      Livingston Favre, PT, DPT

## 2021-02-24 NOTE — DISCHARGE INSTR - COC
Continuity of Care Form    Patient Name: Amy Esqueda   :  1927  MRN:  [id number]    Admit date:  2021  Discharge date:  ***    Code Status Order: Full Code   Advance Directives:   Advance Care Flowsheet Documentation       Date/Time Healthcare Directive Type of Healthcare Directive Copy in [address] Agent's Name Healthcare Agent's Phone Number    21 2300  Yes, patient has an advance directive for healthcare treatment  Durable power of  for health care  --  Healthcare power [facility]   [facility]  [phone]    21 1713  Yes, patient has an advance directive for healthcare treatment  Durable power of  for health care  --  Healthcare power of   [facility]  [phone]            Admitting Physician:  Janis Sevilla MD  PCP: Nancy Pepe MD    Discharging Nurse: [facility] Unit/Room#: 5435/5807-23  Discharging Unit Phone Number: ***    Emergency Contact:   Extended Emergency Contact Information  Primary Emergency Contact: Cassandra Kohli  Address: [address] [place] [address] [facility], Lokesh Tripp Moritz [address] [place] [address] Phone: [phone]  Mobile Phone: [phone]  Relation: Spouse  Secondary Emergency Contact: Jennifer Santos  Mobile Phone: [phone]  Relation: Niece/Nephew    Past Surgical History:  Past Surgical History:   Procedure Laterality Date    CAPSULOTOMY Bilateral 2011    [facility] - Dr. May Byrne, YAG capsulotomies for secondary cataracts    CATARACT REMOVAL WITH IMPLANT Left 10/6/2006    with vitrectomy & membrane peel [facility] - Dr Laura Acosta and Chely Slater    COLONOSCOPY  05    diverticulosis, no polyps Dr. Stephanie Sierra. No re-screening needed per Dr. Stephanie Sierra.     CORONARY ANGIOPLASTY WITH STENT PLACEMENT  2016    PDA     HERNIA REPAIR      SKIN BIOPSY Right 2012    Right ear keloid - Dr. Yessi Moore Right 10/2010    Squamous cell - right ear - Dr. Patti Guy COVID-19 (Ordered)   07/14/20 Rule-Out Test Resulted            Nurse Assessment:  Last Vital Signs: /66   Pulse 76   Temp 98.3 °F (36.8 °C) (Oral)   Resp 16   Ht 5' 9\" (1.753 m)   Wt 129 lb 3 oz (58.6 kg)   SpO2 98%   BMI 19.08 kg/m²     Last documented pain score (0-10 scale): Pain Level: 0  Last Weight:   Wt Readings from Last 1 Encounters:   02/25/21 129 lb 3 oz (58.6 kg)     Mental Status:  {IP PT MENTAL STATUS:20030:::0}    IV Access:  { KAUSHIK IV ACCESS:472250370:::0}    Nursing Mobility/ADLs:  Walking   {CHP DME ADLs:244787849:::0}  Transfer  {CHP DME ADLs:043646442:::0}  Bathing  {CHP DME ADLs:727656912:::0}  Dressing  {CHP DME ADLs:554979041:::0}  Toileting  {CHP DME ADLs:654207112:::0}  Feeding  {CHP DME ADLs:204711480:::0}  Med Admin  {CHP DME ADLs:952543301:::0}  Med Delivery   { KAUSHIK MED Delivery:586651417:::0}    Wound Care Documentation and Therapy:        Elimination:  Continence:   · Bowel: {YES / HX:04328}  · Bladder: {YES / AM:25621}  Urinary Catheter: {Urinary Catheter:718907067:::0}   Colostomy/Ileostomy/Ileal Conduit: {YES / TA:32877}       Date of Last BM: ***    Intake/Output Summary (Last 24 hours) at 2/25/2021 1144  Last data filed at 2/25/2021 0655  Gross per 24 hour   Intake 790 ml   Output 30 ml   Net 760 ml     I/O last 3 completed shifts:   In: 46 [P.O.:910]  Out: 27 [Urine:30]    Safety Concerns:     [address] Safety Concerns:482824028:::0}    Impairments/Disabilities:      [address] Impairments/Disabilities:544073908:::0}    Nutrition Therapy:  Current Nutrition Therapy:   [address] Diet List:353053091:::0}    Routes of Feeding: {CHP DME Other Feedings:542795673:::0}  Liquids: {Slp liquid thickness:60636}  Daily Fluid Restriction: {CHP DME Yes amt example:680965468:::0}  Last Modified Barium Swallow with Video (Video Swallowing Test): {Done Not Done RNAH:244563208:::0}    Treatments at the Time of Hospital Discharge:   Respiratory Treatments: ***  Oxygen Therapy:  {Therapy; copd oxygen:58902:::0}  Ventilator:    { CC Vent List:979981930:::0}    Rehab Therapies: {THERAPEUTIC INTERVENTION:2938706776}  Weight Bearing Status/Restrictions: [address] CC Weight Bearin:::0}  Other Medical Equipment (for information only, NOT a DME order):  {EQUIPMENT:694786149}  Other Treatments: ***    Patient's personal belongings (please select all that are sent with patient):  {CHP DME Belongings:713164587:::0}    RN SIGNATURE:  {Esignature:825365138:::0}    CASE MANAGEMENT/SOCIAL WORK SECTION    Inpatient Status Date: ***    Readmission Risk Assessment Score:  Readmission Risk              Risk of Unplanned Readmission:        10           Discharging to Facility/ [address] [facility],[address] [place] [address], [address] [facility] [address]         Phone: [phone]       Fax: [phone]            / signature: Electronically signed by Henry Arroyo RN on 21 at 12:54 PM EST    PHYSICIAN SECTION    Prognosis: Fair    Condition at Discharge: Stable    Rehab Potential (if transferring to Rehab): Fair    Recommended Labs or Other Treatments After Discharge: MBS and outpatient Palliative Care services    Physician Certification: I certify the above information and transfer of Donna Gudino  is necessary for the continuing treatment of the diagnosis listed and that he requires [facility] for greater  30 days.      Update Admission H&P: No change in H&P    PHYSICIAN SIGNATURE:  Electronically signed by Iam Cohen MD on 21 at 12:26 PM EST

## 2021-02-24 NOTE — PROGRESS NOTES
fatigue;Patient Tolerated treatment well  Safety Devices  Safety Devices in place: Yes  Type of devices: All fall risk precautions in place;Gait belt;Patient at risk for falls;Call light within reach; Chair alarm in place; Left in chair;Nurse notified         Patient Diagnosis(es): The primary encounter diagnosis was General weakness. A diagnosis of Inability to walk was also pertinent to this visit. has a past medical history of Atrial fibrillation ([address] Carotid artery disease ([address] GERD (gastroesophageal reflux disease), Headaches due to old head injury, Heart murmur, systolic, Hyperlipidemia LDL goal < 100, Hypertension, Moderate aortic insufficiency, Multi-infarct state, PUD (peptic ulcer disease), Right leg weakness, and SBO (small bowel obstruction) ([address] has a past surgical history that includes hernia repair; capsulotomy (Bilateral, 11/2011); skin biopsy (Right, 11/2012); Skin cancer excision (Right, 10/2010); vitrectomy (Right, 6/6/2007); Cataract removal with implant (Left, 10/6/2006); Colonoscopy (1/11/05); Coronary angioplasty with stent (11/2016); and Upper gastrointestinal endoscopy (N/A, 1/6/2020). Restrictions  Position Activity Restriction  Other position/activity restrictions: up with assist  Subjective   General  Chart Reviewed: Yes  Patient assessed for rehabilitation services?: Yes  Additional Pertinent Hx: 55-year-old male past medical history A. fib on Eliquis hyperlipidemia CAD and GERD who presented to the ED with complaints of generalized weakness. Patient states he is usually able to ambulate using a walker and works with physical therapy 3 times a week. However patient was unable to walk today. Patient lives with his wife and they are typically able to complete most ADLs. Paramedics state that they are often called to the house for lift assistance.   Patient states he has had an increase in lower extremity swelling over the last week has an increase in his shortness of breath. Patient states today when he was walking with PT his legs were too weak to hold him. He denies falling. PT assisted him to the floor. Referring Practitioner: Víctor Antonio DO  Diagnosis: Fatigue/Generalized Weakness  Subjective  Subjective: pt in bed  upon arrival, agreeable to therapy session, increased cognition and responsiveness this date, more alert      Orientation  Orientation  Orientation Level: Oriented to person;Disoriented to time;Disoriented to situation;Disoriented to place(oriented to place as hospital, unsure which one)  Objective    ADL  Grooming: Setup;Supervision(wipe face with cloth, comb hair)  LE Dressing: Maximum assistance(brief change)  Toileting: Maximum assistance(pericare with brief change in stance)        Balance  Sitting Balance: Contact guard assistance  Standing Balance: Maximum assistance(Max + Min of 1 to stand to roxana stedy)  Standing Balance  Activity: squat pivot to chair from EOB, stance for pericare in [facility]  Bed mobility  Supine to Sit: Maximum assistance  Transfers  Sit Pivot Transfers: Maximum assistance; Moderate assistance;2 Person assistance(Max of 1 + Mind/Mod of 1)  Transfer Comments: squat pivot EOB to chair Max of 1 + Min/Mod of 1                       Cognition  Arousal/Alertness: Delayed responses to stimuli;Inconsistent responses to stimuli  Following Commands: Inconsistently follows commands; Follows one step commands with increased time; Follows one step commands with repetition  Attention Span: Difficulty attending to directions  Insights: Decreased awareness of deficits  Initiation: Requires cues for all                    Type of ROM/Therapeutic Exercise  Type of ROM/Therapeutic Exercise: AROM  Exercises  Shoulder Flexion: x10 BUE  Shoulder ABduction: x10 BUE  Shoulder ADduction: x10 BUE  Elbow Flexion: x10 BUE  Elbow Extension: x10 BUE                    Plan   Plan  Times per week: 2-5  Times per day: Daily    AM-PAC Score        AM-PAC Inpatient Daily Activity Raw Score: 9 (02/23/21 0913)  AM-PAC Inpatient ADL T-Scale Score : 25.33 (02/23/21 0913)  ADL Inpatient CMS 0-100% Score: 79.59 (02/23/21 0913)  ADL Inpatient CMS G-Code Modifier : CL (02/23/21 0913)    Goals  Short term goals  Time Frame for Short term goals: d/c  Short term goal 1: pt will be Marlin x1 with sitting balance EOB ~1 minute (goal met- keep for consistancy)  Short term goal 2: pt will be ModA x2 with sit<>stand- not met  Short term goal 3: pt will particpate in UE exercise HEP- progressing  Short term goal 4: pt will be SBA with simple grooming task in supported seated position- goal met  Patient Goals   Patient goals : not stated       Therapy Time   Individual Concurrent Group Co-treatment   Time In 1423         Time Out 1453         Minutes 30           Timed Code Treatment Minutes:  30  Total Treatment Minutes:  [facility], COCO, OTR/L

## 2021-02-24 NOTE — PROGRESS NOTES
volume, however question if pt would be able to complete this consistently without cues.  Head turn to right attempted with thin via cup, resulting in increased amount of pooling and residue with aspiration after the swallow.  Head turn to left did not result in increased residue, however bolus cont to result in mod-deep penetration that did not clear. Pt not able to produce effective volitional cough to clear.  Pt demonstrated adequate mastication with solid texture, with mod residue again noted in pyriforms.  Additional trials of nectar liquid cleared most of residue and did not result in penetration or aspiration. Recommend dysphagia II - minced/moist with nectar thick liquids      Chart reviewed. Medical Diagnosis: Malaise and fatigue  Treatment Diagnosis: dysphagia    BSE Impression (02/23/2021)-  Pt has a long standing history of dysphagia with silent aspiration dating back to 2017. Pt has had 4 MBSs in the past with various diet recommendations (5/1/17-NPO, 5/4/17-Puree/NTL, 2/25/177-minced and moist with HTL, 1/7/20-minced and moist with NTL) Spoke with wife at great length prior to the bedside swallowing assessment. Initially wife reported thickening liquids at home but then said \"but I don't thicken his coffee because he gets after me\" then later stated \"I don't thicken his Coke because he doesn't like it thickened. Sometimes I'll sneak in a little thickener\" On this date, pt was very lethargic as he just finished a session with OT/PT and was sitting up in the chair. Pt was not able to answer any questions re: his diet, history of dysphagia or his wishes. Pt has limited dentition. Pt unable to follow commands for oral motor assessment. Pt was analyzed with oatmeal, scrambled eggs, ice chips and water by cup and straw. Pt with opened posture mastication with solids with some tongue thrusting. Mastication is mildly prolonged with no anterior spillage or oral residue.   With ice chip and first bite of oatmeal, pt produced a loud audible swallow and then produced a reactive cough. Pt with head slightly hyperextended so placed pillow behind head to bring head into neutral position. Pt then given additional trials of oatmeal, scrambled eggs and water by cup and straw. Pt with no s/s aspiration,but pt has long standing history of silent aspiration, so unable to fully determine tolerance at bedside. Given pt's age and non-compliance with thickened liquid recommendations in the past, recommend dysphagia III/soft and bite sized diet with thin liquids by single sips, no straws and medication in applesauce. MBS results (none this admit)-    Pain: none indicated    Current Diet : minced and moist / thins    Treatment:  Pt seen bedside to address the following goals:  1- The pt and/or caregiver will demonstrate understanding of swallowing recommendations and concerns. 2/23- The pt was educated to purpose of the visit, concerns for aspiration, diet recommendation and swallowing strategies. The pt displayed no evidence of comprehension. Also attempted to discuss with pt his wishes for diet level, if he would be agreeable to nectar thick liquids if recommended, but the pt did not respond. Spoke with pt's wife, Gill Barone, at great length prior to the assessment and left a message after the session. The wife reported she was thickening liquids at home but then stated \"but I don't thicken his coffee because he gets after me\"  Then later stated \"I don't thicken his Coke because he doesn't like it that way\" \"I don't thicken his water\". Through out the discussion, the wife demonstrated no comprehension of previous diet recommendations and frequently asked Birdena Lefort you seen him yet? \" Shannona Latoyaerry is he doing? \" \"Is he coming home to day? \". Also attempted to have conversation with wife about quality of life, given his advanced age,  that pt clearly does not not like the thickened liquids and will likely not be compliant in the future. Discussed that even though pt would benefit from repeat Modified Barium Swallow given his history, it is not indicated due to history of non-compliance. Wife had questionable comprehension. con't goal    2/24: Pt educated to purpose of visit. Reviewed dysphagia history with him (multiple MBS, aspiration history, previous thickener recommendation, phone discussion with wife yesterday (see 2/23)). Educated regarding oral hygiene, importance of small sips, avoid straws, small sips, upright position. Pt declined use of thickener. Recommended downgrade to puree this date given lack of dentition and RN reported swallowing/choking/coughing incident with green bean earlier. Cont goal    2- the pt will utilize swallowing strategies to decrease risk for aspiration with MAX cues/assistance  2/24: Patient required mod cues for small sips, and slow rate. Cont goal     Patient/Family/Caregiver Education:  See goal 2 above    Compensatory Strategies:  Upright as possible for all oral intake; No straws;  Small bites/sips     Plan:  Continued daily Dysphagia treatment with goals per  plan of care. Diet recommendations:downgrade to puree with thin liquids/NO STRAWS  Medication in applesauce or pudding   DC recommendation: TBD  Treatment: 15  D/W nursing, Day Ramos  Needs met prior to leaving room, call button in reach. Juliann Calderon M.A., [address] [facility] [phone]  Speech-Language Pathologist  Pg.  # [phone]    If patient is discharged prior to next treatment, this note will serve as the discharge summary

## 2021-02-24 NOTE — DISCHARGE INSTR - DIET

## 2021-02-24 NOTE — PROGRESS NOTES
Progress Note    Admit Date: 2/22/2021  Diet: DIET GENERAL; Low Sodium (2 GM); Dysphagia Soft and Bite-Sized      Interval history:   NAEON. Vitals stable, serum elec wnl. The pt will respond to questions appropriately with 2-3 word answers however he is hard of hearing. I spoke to patient's PCP to get an idea of his baseline and the patient in his mental status has been declining progressively over the past year. 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 Stable appearing small intraparenchymal hematoma in the left superior frontal gyrus on axial image 46, series 3.       2. Encephalomalacia in the right frontal lobe and bilateral occipital lobe territory is consistent with remote infarcts.       3. Mild/moderate white matter hypoattenuation favoring microvascular ischemic changes. Remote cerebellar lacunar infarcts. Arteriosclerosis. PTOT scores low with an assessment of decreased functional mobility . This puts the patient at risk of increased falls. Assistance from case management appreciated. Pt's wife wants pt to come home. Echo Overall left ventricular systolic function appears dynamic with an ejection  fraction >65%. No regional wall motion abnormalities    Mild to moderate aortic and mitral regurgitation . Moderate tricuspid regurgitation.        Medications:     Scheduled Meds:   apixaban  2.5 mg Oral BID    atorvastatin  80 mg Oral Daily    finasteride  5 mg Oral Daily    furosemide  60 mg Oral Daily    tamsulosin  0.4 mg Oral Nightly    vitamin B-12  1,000 mcg Oral Daily    sodium chloride flush  10 mL Intravenous 2 times per day    influenza virus vaccine  0.5 mL Intramuscular Prior to discharge     Continuous Infusions:  PRN Meds:perflutren lipid microspheres, sodium chloride flush, promethazine **OR** ondansetron, polyethylene glycol, acetaminophen **OR** acetaminophen    Objective:   Vitals:   T-max:  Patient Vitals for the past 8 hrs:   BP Temp Temp src Pulse Resp SpO2 Weight   02/24/21 0930 124/80 97.4 °F (36.3 °C) Axillary 80 -- 90 % --   02/24/21 0519 -- -- -- -- -- -- 131 lb 9.8 oz (59.7 kg)   02/24/21 0515 129/76 97.4 °F (36.3 °C) Axillary 79 16 93 % --       Intake/Output Summary (Last 24 hours) at 2/24/2021 1111  Last data filed at 2/24/2021 0517  Gross per 24 hour   Intake 370 ml   Output 0 ml   Net 370 ml         Physical Exam  Physical Exam  Vitals signs reviewed. Constitutional:       General: He is not in acute distress. Appearance: He is normal weight. He is not toxic-appearing. HENT:      Head: Normocephalic. Mouth/Throat:      Mouth: Mucous membranes are dry. Eyes:      Extraocular Movements: Extraocular movements intact. Cardiovascular:      Rate and Rhythm: Normal rate. Rhythm irregular. Pulses: Normal pulses. Pulmonary:      Effort: Pulmonary effort is normal. No respiratory distress. Breath sounds: Normal breath sounds. No wheezing. Abdominal:      General: Bowel sounds are normal. There is no distension. Tenderness: There is no abdominal tenderness. Musculoskeletal:      Right lower leg: No edema. Left lower leg: No edema. Neurological:      Motor: Weakness present. Comments: Oriented x2  3/5 bl UE  2/5 bl LE     LABS:    CBC:   Recent Labs     02/22/21  1545 02/23/21  0523 02/24/21  0714   WBC 12.8* 11.9* 9.0   HGB 15.9 15.8 14.7   HCT 50.0 49.2 44.9    167 190   MCV 98.1 97.5 96.8     Renal:    Recent Labs     02/22/21  1545 02/23/21  0523 02/24/21  0714    138 138   K 4.4 4.6 4.3    105 103   CO2 25 24 29   BUN 13 13 16   CREATININE 0.8 0.6* 0.7*   GLUCOSE 106* 84 90   CALCIUM 9.3 9.0 9.1   ANIONGAP 8 9 6     Troponin:   Recent Labs     02/22/21  1545   TROPONINI <0.01     -------------------------------------  RAD:   XR CHEST PORTABLE   Final Result   1. There are streaky bibasilar airspace opacities which are similar in comparison to July 14, 2020.  This may reflect persistent scarring versus recurrent airspace disease. 2. Small right pleural effusion. .              Assessment/Plan:     Generalized weakness likely 2/2 to age-related physical debility  Patient presents with increasing weakness for the past 3 days. Works with PT at home. Unable to stand on his feet today. Endorses decreased p.o. intake. - IVF and PO hydration  - Vitamin B12, folate, vitamin D pending  - SLP, PT/OT, CW consulted  - Pt has increased risk of falls     Lower extremity pitting edema  On presentation patient's lower extremities with 2+ pitting edema. Up to the knee. Other signs of fluid overload not present. Most likely from venous insufficiency      Heart failure with preserved EF, not in acute exacerbation  Left ventricular ejection fraction 71% per stress test in October 2016. Patient takes 55 mg Lasix daily  - Daily weights  - Keep K greater than 4 and mag greater than 2  - Is and Os  - Continue home Lasix     Atrial fibrillation - continue home Eliquis  Hyperlipidemia - continue home Lipitor  BPH - continue home finasteride, Flomax        Will discuss with attending physician Dr. Sawyer Bui     Code Status:Full code  FEN: general, low sodium  PPX: eliquis  DISPO: ROXANNE King MD  2/23/2021,  10:08 AM    Addendum to Resident H& P/Progress note:  I have personally seen,examined and evaluated the patient. I have reviewed the current history, physical findings, labs and assessment and plan and agree with note as documented by resident MD ( Jeffery Rosales)  I appreciate an input and hard work of our , Jamia Gay, with d/c planning.     Naomi Feliz MD, FACP

## 2021-02-25 VITALS
HEART RATE: 82 BPM | SYSTOLIC BLOOD PRESSURE: 134 MMHG | RESPIRATION RATE: 18 BRPM | OXYGEN SATURATION: 97 % | HEIGHT: 69 IN | DIASTOLIC BLOOD PRESSURE: 80 MMHG | BODY MASS INDEX: 19.13 KG/M2 | WEIGHT: 129.19 LBS | TEMPERATURE: 97.4 F

## 2021-02-25 LAB
ANION GAP SERPL CALCULATED.3IONS-SCNC: 7 MMOL/L (ref 3–16)
BASOPHILS ABSOLUTE: 0 K/UL (ref 0–0.2)
BASOPHILS RELATIVE PERCENT: 0.2 %
BILIRUBIN URINE: NEGATIVE
BLOOD, URINE: ABNORMAL
BUN BLDV-MCNC: 20 MG/DL (ref 7–20)
CALCIUM SERPL-MCNC: 8.8 MG/DL (ref 8.3–10.6)
CHLORIDE BLD-SCNC: 103 MMOL/L (ref 99–110)
CLARITY: CLEAR
CO2: 27 MMOL/L (ref 21–32)
COLOR: YELLOW
CREAT SERPL-MCNC: 0.7 MG/DL (ref 0.8–1.3)
EOSINOPHILS ABSOLUTE: 0.3 K/UL (ref 0–0.6)
EOSINOPHILS RELATIVE PERCENT: 2.8 %
EPITHELIAL CELLS, UA: ABNORMAL /HPF (ref 0–5)
GFR AFRICAN AMERICAN: >60
GFR NON-AFRICAN AMERICAN: >60
GLUCOSE BLD-MCNC: 85 MG/DL (ref 70–99)
GLUCOSE URINE: NEGATIVE MG/DL
HCT VFR BLD CALC: 43 % (ref 40.5–52.5)
HEMOGLOBIN: 14.4 G/DL (ref 13.5–17.5)
HYALINE CASTS: ABNORMAL /LPF (ref 0–2)
KETONES, URINE: NEGATIVE MG/DL
LEUKOCYTE ESTERASE, URINE: NEGATIVE
LYMPHOCYTES ABSOLUTE: 1.4 K/UL (ref 1–5.1)
LYMPHOCYTES RELATIVE PERCENT: 15.1 %
MCH RBC QN AUTO: 31.8 PG (ref 26–34)
MCHC RBC AUTO-ENTMCNC: 33.5 G/DL (ref 31–36)
MCV RBC AUTO: 95 FL (ref 80–100)
MICROSCOPIC EXAMINATION: YES
MONOCYTES ABSOLUTE: 0.9 K/UL (ref 0–1.3)
MONOCYTES RELATIVE PERCENT: 9.4 %
NEUTROPHILS ABSOLUTE: 6.7 K/UL (ref 1.7–7.7)
NEUTROPHILS RELATIVE PERCENT: 72.5 %
NITRITE, URINE: NEGATIVE
PDW BLD-RTO: 15.5 % (ref 12.4–15.4)
PH UA: 6 (ref 5–8)
PLATELET # BLD: 183 K/UL (ref 135–450)
PMV BLD AUTO: 8.1 FL (ref 5–10.5)
POTASSIUM REFLEX MAGNESIUM: 3.7 MMOL/L (ref 3.5–5.1)
PROTEIN UA: NEGATIVE MG/DL
RBC # BLD: 4.53 M/UL (ref 4.2–5.9)
RBC UA: >100 /HPF (ref 0–4)
SODIUM BLD-SCNC: 137 MMOL/L (ref 136–145)
SPECIFIC GRAVITY UA: 1.02 (ref 1–1.03)
URINE TYPE: ABNORMAL
UROBILINOGEN, URINE: 1 E.U./DL
WBC # BLD: 9.2 K/UL (ref 4–11)
WBC UA: ABNORMAL /HPF (ref 0–5)

## 2021-02-25 PROCEDURE — 80048 BASIC METABOLIC PNL TOTAL CA: CPT

## 2021-02-25 PROCEDURE — 81001 URINALYSIS AUTO W/SCOPE: CPT

## 2021-02-25 PROCEDURE — 97530 THERAPEUTIC ACTIVITIES: CPT

## 2021-02-25 PROCEDURE — 92526 ORAL FUNCTION THERAPY: CPT

## 2021-02-25 PROCEDURE — 99239 HOSP IP/OBS DSCHRG MGMT >30: CPT | Performed by: HOSPITALIST

## 2021-02-25 PROCEDURE — 6370000000 HC RX 637 (ALT 250 FOR IP): Performed by: STUDENT IN AN ORGANIZED HEALTH CARE EDUCATION/TRAINING PROGRAM

## 2021-02-25 PROCEDURE — G0378 HOSPITAL OBSERVATION PER HR: HCPCS

## 2021-02-25 PROCEDURE — 99221 1ST HOSP IP/OBS SF/LOW 40: CPT | Performed by: NURSE PRACTITIONER

## 2021-02-25 PROCEDURE — 2580000003 HC RX 258: Performed by: STUDENT IN AN ORGANIZED HEALTH CARE EDUCATION/TRAINING PROGRAM

## 2021-02-25 PROCEDURE — 85025 COMPLETE CBC W/AUTO DIFF WBC: CPT

## 2021-02-25 PROCEDURE — 36415 COLL VENOUS BLD VENIPUNCTURE: CPT

## 2021-02-25 RX ADMIN — APIXABAN 2.5 MG: 2.5 TABLET, FILM COATED ORAL at 11:46

## 2021-02-25 RX ADMIN — FUROSEMIDE 60 MG: 40 TABLET ORAL at 11:46

## 2021-02-25 RX ADMIN — ATORVASTATIN CALCIUM 80 MG: 80 TABLET, FILM COATED ORAL at 11:45

## 2021-02-25 RX ADMIN — FINASTERIDE 5 MG: 5 TABLET, FILM COATED ORAL at 11:46

## 2021-02-25 RX ADMIN — CYANOCOBALAMIN TAB 1000 MCG 1000 MCG: 1000 TAB at 11:46

## 2021-02-25 RX ADMIN — Medication 10 ML: at 11:47

## 2021-02-25 ASSESSMENT — PAIN SCALES - WONG BAKER
WONGBAKER_NUMERICALRESPONSE: 0

## 2021-02-25 ASSESSMENT — PAIN SCALES - GENERAL
PAINLEVEL_OUTOF10: 0

## 2021-02-25 NOTE — PROGRESS NOTES
Speech Language Pathology  Facility/Department: [address]  Dysphagia Daily Treatment Note    NAME: Devonte Gibbs  : 1927  MRN: [id number]    Patient Diagnosis(es):   Patient Active Problem List    Diagnosis Date Noted    Coronary artery disease due to lipid rich plaque      Priority: High    Malaise and fatigue 2021    Elevated alkaline phosphatase level 2021    Chronic diastolic heart failure ([address]     Dental disease 2019    Pharyngoesophageal dysphagia 2018    Elevated TSH 2018    Bilateral lower extremity edema     General weakness     Chronic atrial fibrillation ([address]     Multi-infarct state 2016    Hypertension 2016    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 2016    Carotid stenosis, bilateral 01/10/2014    Moderate aortic insufficiency 2014    PUD (peptic ulcer disease) 2013    GERD (gastroesophageal reflux disease) 2013    Hyperlipidemia with target LDL less than 100 2013     Allergies: Allergies   Allergen Reactions    Aspirin Other (See Comments)     Ulcer     Onset Date: 2021      Recent Chest Xray 21  1. There are streaky bibasilar airspace opacities which are similar in comparison to 2020. This may reflect persistent scarring versus recurrent airspace disease. 2. Small right pleural effusion. .      Pt has had 4 MBSs in the past- most recent 20  Pt exhibiting mod pharyngeal phase dysphagia.  Puree and nectar liquids were consumed with no penetration or aspiration, mild residue noted in pyriforms.  Decreased laryngeal elevation and reduced epiglottal closure resulted in silent aspiration of thin liquids via straw, during the swallow.  Intermittent penetration/ aspiration was identified with thin via cup, occurring after the swallow, spilling over from residue in pyriforms, again with no cough occurring.  Pt with decreased penetration with smaller volume, however question if pt would be able to complete this consistently without cues.  Head turn to right attempted with thin via cup, resulting in increased amount of pooling and residue with aspiration after the swallow.  Head turn to left did not result in increased residue, however bolus cont to result in mod-deep penetration that did not clear. Pt not able to produce effective volitional cough to clear.  Pt demonstrated adequate mastication with solid texture, with mod residue again noted in pyriforms.  Additional trials of nectar liquid cleared most of residue and did not result in penetration or aspiration. Recommend dysphagia II - minced/moist with nectar thick liquids      Chart reviewed. Medical Diagnosis: Malaise and fatigue  Treatment Diagnosis: dysphagia    BSE Impression (02/23/2021)-  Pt has a long standing history of dysphagia with silent aspiration dating back to 2017. Pt has had 4 MBSs in the past with various diet recommendations (5/1/17-NPO, 5/4/17-Puree/NTL, 2/25/177-minced and moist with HTL, 1/7/20-minced and moist with NTL) Spoke with wife at great length prior to the bedside swallowing assessment. Initially wife reported thickening liquids at home but then said \"but I don't thicken his coffee because he gets after me\" then later stated \"I don't thicken his Coke because he doesn't like it thickened. Sometimes I'll sneak in a little thickener\" On this date, pt was very lethargic as he just finished a session with OT/PT and was sitting up in the chair. Pt was not able to answer any questions re: his diet, history of dysphagia or his wishes. Pt has limited dentition. Pt unable to follow commands for oral motor assessment. Pt was analyzed with oatmeal, scrambled eggs, ice chips and water by cup and straw. Pt with opened posture mastication with solids with some tongue thrusting. Mastication is mildly prolonged with no anterior spillage or oral residue.   With ice chip and first bite of oatmeal, pt produced a loud audible swallow and then produced a reactive cough. Pt with head slightly hyperextended so placed pillow behind head to bring head into neutral position. Pt then given additional trials of oatmeal, scrambled eggs and water by cup and straw. Pt with no s/s aspiration,but pt has long standing history of silent aspiration, so unable to fully determine tolerance at bedside. Given pt's age and non-compliance with thickened liquid recommendations in the past, recommend dysphagia III/soft and bite sized diet with thin liquids by single sips, no straws and medication in applesauce. MBS results - may consider performing MBS depending on family's wishes. Attempted to discuss with wife on 2/23, but wife did not appear to comprehend. Pain: none indicated    Current Diet : pureed/thin 2/25- recommend downgrade to pureed with nectar thick liquids     Treatment:  Pt seen bedside to address the following goals:  1- The pt and/or caregiver will demonstrate understanding of swallowing recommendations and concerns. 2/23- The pt was educated to purpose of the visit, concerns for aspiration, diet recommendation and swallowing strategies. The pt displayed no evidence of comprehension. Also attempted to discuss with pt his wishes for diet level, if he would be agreeable to nectar thick liquids if recommended, but the pt did not respond. Spoke with pt's wife, Ward Blair, at great length prior to the assessment and left a message after the session. The wife reported she was thickening liquids at home but then stated \"but I don't thicken his coffee because he gets after me\"  Then later stated \"I don't thicken his Coke because he doesn't like it that way\" \"I don't thicken his water\". Through out the discussion, the wife demonstrated no comprehension of previous diet recommendations and frequently asked Haydee Rather you seen him yet? \" Leopoldo Cruz is he doing? \" \"Is he coming home to day? \".   Also attempted to have conversation with wife about quality of life, given his advanced age,  that pt clearly does not not like the thickened liquids and will likely not be compliant in the future. Discussed that even though pt would benefit from repeat Modified Barium Swallow given his history, it is not indicated due to history of non-compliance. Wife had questionable comprehension. con't goal    2/24: Pt educated to purpose of visit. Reviewed dysphagia history with him (multiple MBS, aspiration history, previous thickener recommendation, phone discussion with wife yesterday (see 2/23)). Educated regarding oral hygiene, importance of small sips, avoid straws, small sips, upright position. Pt declined use of thickener. Recommended downgrade to puree this date given lack of dentition and RN reported swallowing/choking/coughing incident with green bean earlier. Cont goal  2/25- Spoke with RN who reported pt was coughing with green beans yesterday so was downgraded to pureed diet. Discussed concerns with RN that pt has history of silent aspiration and that family has been non-complaint with recommendations for thickened liquids. Also told RN that spoke to wife re: allowing pt to have what he wishes for quality of life given his age seeing as pt is not agreeable to consuming thickened liquids at home, but then wife said \" he is agreeable to things\" even though she said earlier that the pt \"gets after her\" if she puts thickened in his liquids. On this date, pt was analyzed with trials of puree and nectar thick water by tsp- pt with loud audible swallow with all trials. Pt noted to cough following 80% of trials with increased coughing as session progressed. Discon't po trials due to concern for pt safety. It is questionable if pt is tolerating PO intake. Attempted to have discussion with pt re: his wishes for feeding tube (which may not prevent aspiration or prolong life given his advanced age) vs. Continuing to eat for QOL.  Pt did not respond. Discussed with RN that pt would benefit from palliative care consult to help determine pt/family wishes. Repeat MBS is warranted, but if family would want pt to continue to eat by mouth for quality of life, repeating MBS would be pointless, especially because pt has had 4 MBSs in the past . con't goal    2- the pt will utilize swallowing strategies to decrease risk for aspiration with MAX cues/assistance  2/24: Patient required mod cues for small sips, and slow rate. Cont goal  2/25-  N/a- Pt was very lethargic and unable to feed self. con't goal as appropriate. Patient/Family/Caregiver Education:  See goal 2 above    Compensatory Strategies:  Upright as possible for all oral intake; No straws;  Small bites/sips     Plan:  Continued daily Dysphagia treatment with goals per  plan of care. Diet recommendations:downgrade to puree with nectar thick liquids/NO STRAWS  Medication in applesauce or pudding     Pt would benefit from palliative care consult to determine pt/family wishes- aggressive care with repeat MBS and potential for modified diet/recommendation for NPO  Vs. Allowing pt to eat by mouth for quality of life    DC recommendation: TBD  Treatment: 20  D/W nursing, Eb  Needs met prior to leaving room, call button in reach.     Tung Benites, [facility] Julianna Trevino [address]  Speech-Language Pathologist  Pager [phone]      If patient is discharged prior to next treatment, this note will serve as the discharge summary

## 2021-02-25 NOTE — CARE COORDINATION
Patient to transport at 1800 to [facility] by [address] ambulance. Nurse report [phone]  Fax [phone]  Electronically signed by Henry Arroyo RN on 2/25/2021 at 12:41 PM     Addendum: Faxed discharge packet to [facility].  Electronically signed by Henry Arroyo RN on 2/25/2021 at 1:03 PM

## 2021-02-25 NOTE — CARE COORDINATION
Case Management Assessment            Discharge Note                    Date / Time of Note: 2/25/2021 12:06 PM                  Discharge Note Completed by: Bobo De La Torre    Patient Name: Rosio Alarcon   YOB: 1927  Diagnosis: Malaise and fatigue [R53.81, R53.83]   Date / Time: 2/22/2021  3:09 PM    Current PCP: Chelly Elliott MD  Clinic patient: No    Hospitalization in the last 30 days: No    Advance Directives:  Code Status: Full Code  [place] DNR form completed and on chart: Not Indicated    Financial:  Payor: Macario Calzada / Plan: [place] / Product Type: *No Product type* /      Pharmacy:    [facility], [address] [place] [address] [phone] -  [phone]  [address] [place] [address]  Phone: [phone] Fax: [phone]    [id number] - [id number] [address] [phone] [facility] [phone]  [address] [place] [address]  Phone: [phone] Fax: [phone]      Assistance purchasing medications?:    Assistance provided by Case Management: None at this time    Does patient want to participate in local refill/ meds to beds program?:      Meds To Beds General Rules:  1. Can ONLY be done Monday- Friday between 8:30am-5pm  2. Prescription(s) must be in pharmacy by 3pm to be filled same day  3. Copy of patient's insurance/ prescription drug card and patient face sheet must be sent along with the prescription(s)  4. Cost of Rx cannot be added to hospital bill. If financial assistance is needed, please contact unit  or ;  or  CANNOT provide pharmacy voucher for patients co-pays  5.  Patients can then  the prescription on their way out of the hospital at discharge, or pharmacy can deliver to the bedside if staff is available. (payment due at time of pick-up or delivery - cash, check, or card accepted)     Able to afford home medications/ co-pay costs: SNF    ADLS:  Current PT AM-PAC Score: 7 /24  Current OT AM-PAC Score: 9 /24      DISCHARGE Disposition: Ryan Chacon (SNF): [facility] Phone: [phone] Fax: [phone]    LOC at discharge: Skilled  Miki Trevino Completed: Yes    Notification completed in HENS/PAS?:  Yes : CM has completed HENS online through secure website for SNF admission at [facility]. Document ID #:  [id number]        IMM Completed:   Yes, Case management has presented and reviewed IMM letter #2 to the patient and/or family/ POA. Patient and/or family/POA verbalized understanding of their medicare rights and appeal process if needed. Patient and/or family/POA has signed, initialed and placed today's date (2-25-21) and time (12:30) on IMM letter #2 on the the appropriate lines. Patient and/or family/POA, copy of letter offered and they are aware that this original copy of IMM letter #2 is available prior to discharge from the paper chart on the unit. Electronic documentation has been entered into epic for IMM letter #2 and original paper copy has been added to the paper chart at the nurses station. Transportation:  Transportation PLAN for discharge: EMS transportation   Mode of Transport: Ambulance stretcher - BLS  Reason for medical transport: Other: [address] [place] [address]  Name of [address]: [address]  Phone: [phone]  Time of Transport: 1800      Transport form completed: Yes      Referrals made at [facility] for outpatient continued care:  Not Applicable    Additional CM Notes: Patient to be going to [facility] today. Called patient's [facility] nurse to let her know time, called Jose Antonio Walker (nephew) to apprise of what discharge entails. Will speak to nephew about MBS and Palli consult at SNF to be done.  Electronically signed by Macario Coello RN on 2/25/2021 at 12:15 PM      The Plan for Transition of Care is related to the following treatment goals of Malaise and fatigue [R53.81, R53.83]    The Patient and/or patient representative Jake Mayberry and his family were provided with a choice of provider and agrees with the discharge plan Yes    Freedom of choice list was provided with basic dialogue that supports the patient's individualized plan of care/goals and shares the quality data associated with the providers.  Yes    Care Transitions patient: No    Veronica Vogel RN  [facility]  Case Management Department  Ph: [phone]

## 2021-02-25 NOTE — PROGRESS NOTES
Report called to [facility]. All questions answered. Transported via PTS. There were no personal belongings in room.

## 2021-02-25 NOTE — DISCHARGE SUMMARY
INTERNAL MEDICINE DEPARTMENT AT [address] [place] [address]  DISCHARGE SUMMARY    Patient ID: Fritzi Sharyn                                             Discharge Date: 2/25/2021   Patient's PCP: Frida Ge MD                                          Discharge Physician: Afshin Ya MD  Admit Date: 2/22/2021   Admitting Physician: Mirna rBaga MD    PROBLEMS DURING HOSPITALIZATION:  Hospital Problems           Last Modified POA    General weakness 2/23/2021 Yes    Malaise and fatigue 2/22/2021 Yes        DISCHARGE DIAGNOSES:  Age-related physical debility putting the patient at risk for falls  Lower extremity pitting edema  Heart failure with preserved EF, not in acute exacerbation  Atrial fibrillation   Hyperlipidemia  BPH    Hospital Course:          54-year-old male with a past medical history of heart failure with preserved ejection fraction, A. fib, hyperlipidemia, BPH who presented to the hospital after experiencing quite a bit of weakness and unable to ambulate using his walker.  The patient lives with his wife. Lupe Colón conversation with his PCP it appears that the patient has been declining steadily over the past 1 year. [place] his stay he was alert and oriented  although he was considerably weak and demonstrated difficulty hearing. Víctor Hankins had PT OT evaluation on which she did poorly.  The patient was at increased risk of falling.  He also had an echo which showed a normal left ventricular ejection fraction, no regional wall motion abnormalities and mild to moderate aortic and mitral regurgitation.  Moderate tricuspid regurgitation.  He was diagnosed with generalized weakness likely secondary to age-related physical debility which put him at an increased risk of falling.  After stabilization of his condition patient was discharged to a SNF with instructions to follow-up with his PCP in 1 week for general health progress assessment and discussion of the risks versus benefits of keeping him on blood thinners for A. fib on a background of tendency to fall. Physical Exam:  /66   Pulse 76   Temp 98.3 °F (36.8 °C) (Oral)   Resp 16   Ht 5' 9\" (1.753 m)   Wt 129 lb 3 oz (58.6 kg)   SpO2 98%   BMI 19.08 kg/m²   Physical Exam  Vitals signs reviewed. Constitutional:       General: He is not in acute distress.     Appearance: He is normal weight. He is not toxic-appearing. HENT:      Head: Normocephalic.      Mouth/Throat:      Mouth: Mucous membranes are dry. Eyes:      Extraocular Movements: Extraocular movements intact. Cardiovascular:      Rate and Rhythm: Normal rate. Rhythm irregular.      Pulses: Normal pulses. Pulmonary:      Effort: Pulmonary effort is normal. No respiratory distress.      Breath sounds: Normal breath sounds. No wheezing. Abdominal:      General: Bowel sounds are normal. There is no distension.      Tenderness: There is no abdominal tenderness. Musculoskeletal:      Right lower leg: No edema.      Left lower leg: No edema. Neurological:      Motor: Clifm Joce.      Comments: Oriented x2  3/5 bl UE  2/5 bl LE     Consults: none  Significant Diagnostic Studies:   XR CHEST PORTABLE   Final Result   1. There are streaky bibasilar airspace opacities which are similar in comparison to July 14, 2020. This may reflect persistent scarring versus recurrent airspace disease. 2. Small right pleural effusion. .            Disposition: SNF  Discharged Condition: Stable  Follow Up: Primary Care Physician in one week    DISCHARGE MEDICATION:     Medication List      CHANGE how you take these medications    vitamin B-12 1000 MCG tablet  Commonly known as: CYANOCOBALAMIN  Take OTC B12 or similar B-complex vitamins 2da=0311 mcg a day for neurological health and as a natural energy booster (read a bottle of 5 Hour Energy or diet Red Bull). High B12 levels are proven to help prevent dementia & neuropathy.   What changed:   · how much to take  · how to take this  · when to take this CONTINUE taking these medications    acetaminophen 325 MG tablet  Commonly known as: TYLENOL  Take 2 tablets by mouth every 6 hours as needed for Pain     apixaban 2.5 MG Tabs tablet  Commonly known as: Eliquis  Take 1 tablet by mouth 2 times daily     atorvastatin 80 MG tablet  Commonly known as: LIPITOR  TAKE 1 TABLET BY MOUTH ONE TIME A DAY     finasteride 5 MG tablet  Commonly known as: PROSCAR  Take 1 tablet by mouth daily     furosemide 40 MG tablet  Commonly known as: LASIX  TAKE 1 & 1/2 (ONE & ONE-HALF) TABLETS BY MOUTH ONCE DAILY IN THE MORNING     omeprazole 20 MG tablet  Commonly known as: PriLOSEC OTC  1 daily to protect stomach.     polyethylene glycol 17 GM/SCOOP powder  Commonly known as: GLYCOLAX  Take as directed every other day for constipation     potassium chloride 10 MEQ extended release tablet  Commonly known as: KLOR-CON M  Take 2 tablets by mouth daily     tamsulosin 0.4 MG capsule  Commonly known as: FLOMAX  TAKE 1 CAPSULE BY MOUTH ONCE DAILY IN THE EVENING FOR PROSTATE AND URINATION     traMADol 50 MG tablet  Commonly known as: ULTRAM        STOP taking these medications    potassium chloride 10 MEQ extended release tablet  Commonly known as: KLOR-CON          Activity: activity as tolerated  Diet: regular diet and cardiac diet  Wound Care: none needed    Time Spent on discharge is more than 30 minutes    Signed:  Kateryna Breen MD   2/25/2021    Addendum to Resident H& P/Progress note:  I have personally seen,examined and evaluated the patient.  I have reviewed the current history, physical findings, labs and assessment and plan and agree with note as documented by resident MD ( Amalia Huerta)      India Salas MD, [address]

## 2021-02-25 NOTE — PROGRESS NOTES
increased fatigue and lethargy this date  Safety Devices  Safety Devices in place: Yes  Type of devices: All fall risk precautions in place;Gait belt;Patient at risk for falls;Call light within reach; Chair alarm in place; Left in chair;Nurse notified         Patient Diagnosis(es): The primary encounter diagnosis was General weakness. A diagnosis of Inability to walk was also pertinent to this visit. has a past medical history of Atrial fibrillation ([address] Carotid artery disease ([address] GERD (gastroesophageal reflux disease), Headaches due to old head injury, Heart murmur, systolic, Hyperlipidemia LDL goal < 100, Hypertension, Moderate aortic insufficiency, Multi-infarct state, PUD (peptic ulcer disease), Right leg weakness, and SBO (small bowel obstruction) ([address] has a past surgical history that includes hernia repair; capsulotomy (Bilateral, 11/2011); skin biopsy (Right, 11/2012); Skin cancer excision (Right, 10/2010); vitrectomy (Right, 6/6/2007); Cataract removal with implant (Left, 10/6/2006); Colonoscopy (1/11/05); Coronary angioplasty with stent (11/2016); and Upper gastrointestinal endoscopy (N/A, 1/6/2020). Restrictions  Position Activity Restriction  Other position/activity restrictions: up with assist  Subjective   General  Chart Reviewed: Yes  Patient assessed for rehabilitation services?: Yes  Additional Pertinent Hx: 80-year-old male past medical history A. fib on Eliquis hyperlipidemia CAD and GERD who presented to the ED with complaints of generalized weakness. Patient states he is usually able to ambulate using a walker and works with physical therapy 3 times a week. However patient was unable to walk today. Patient lives with his wife and they are typically able to complete most ADLs. Paramedics state that they are often called to the house for lift assistance.   Patient states he has had an increase in lower extremity swelling over the last week has an increase in his shortness of (02/23/21 0913)    Goals  Short term goals  Time Frame for Short term goals: d/c  Short term goal 1: pt will be Marlin x1 with sitting balance EOB ~1 minute (goal met- keep for consistancy)  Short term goal 2: pt will be ModA x2 with sit<>stand- not met  Short term goal 3: pt will particpate in UE exercise HEP- progressing  Short term goal 4: pt will be SBA with simple grooming task in supported seated position- goal met  Patient Goals   Patient goals : not stated       Therapy Time   Individual Concurrent Group Co-treatment   Time In 1038         Time Out 1116         Minutes 38              Timed Code Treatment Minutes:  38 Minutes    Total Treatment Minutes:  [address], MOT, OTR/L

## 2021-02-25 NOTE — PROGRESS NOTES
Progress Note    Admit Date: 2/22/2021  Diet: DIET GENERAL; Low Sodium (2 GM); Dysphagia Pureed  Dietary Nutrition Supplements: Frozen Oral Supplement      Interval history:   Antolin Pretty. Vitals stable, serum elec wnl. The pt will respond to questions appropriately with 2-3 word answers however he is hard of hearing. I spoke to patient's PCP to get an idea of his baseline and the patient in his mental status has been declining progressively over the past year. 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 Stable appearing small intraparenchymal hematoma in the left superior frontal gyrus on axial image 46, series 3.       2. Encephalomalacia in the right frontal lobe and bilateral occipital lobe territory is consistent with remote infarcts.       3. Mild/moderate white matter hypoattenuation favoring microvascular ischemic changes. Remote cerebellar lacunar infarcts. Arteriosclerosis. PTOT scores low with an assessment of decreased functional mobility . This puts the patient at risk of increased falls. Assistance from case management appreciated. Pt's wife wants pt to come home. She deferred decision to nephew who is ok with SNF. In the process of locating a SNF now. Pending precert. Echo Overall left ventricular systolic function appears dynamic with an ejection  fraction >65%. No regional wall motion abnormalities    Mild to moderate aortic and mitral regurgitation . Moderate tricuspid regurgitation.        Medications:     Scheduled Meds:   apixaban  2.5 mg Oral BID    atorvastatin  80 mg Oral Daily    finasteride  5 mg Oral Daily    furosemide  60 mg Oral Daily    tamsulosin  0.4 mg Oral Nightly    vitamin B-12  1,000 mcg Oral Daily    sodium chloride flush  10 mL Intravenous 2 times per day    influenza virus vaccine  0.5 mL Intramuscular Prior to discharge     Continuous Infusions:  PRN Meds:perflutren lipid microspheres, sodium chloride flush, promethazine **OR** ondansetron, polyethylene glycol, acetaminophen **OR** acetaminophen    Objective:   Vitals:   T-max:  Patient Vitals for the past 8 hrs:   BP Temp Temp src Pulse Resp SpO2   02/25/21 0754 111/70 97.8 °F (36.6 °C) Oral 76 16 93 %   02/25/21 0548 116/77 97.6 °F (36.4 °C) Oral 73 16 93 %       Intake/Output Summary (Last 24 hours) at 2/25/2021 1008  Last data filed at 2/25/2021 0655  Gross per 24 hour   Intake 790 ml   Output 30 ml   Net 760 ml         Physical Exam  Physical Exam  Vitals signs reviewed. Constitutional:       General: He is not in acute distress. Appearance: He is normal weight. He is not toxic-appearing. HENT:      Head: Normocephalic. Mouth/Throat:      Mouth: Mucous membranes are dry. Eyes:      Extraocular Movements: Extraocular movements intact. Cardiovascular:      Rate and Rhythm: Normal rate. Rhythm irregular. Pulses: Normal pulses. Pulmonary:      Effort: Pulmonary effort is normal. No respiratory distress. Breath sounds: Normal breath sounds. No wheezing. Abdominal:      General: Bowel sounds are normal. There is no distension. Tenderness: There is no abdominal tenderness. Musculoskeletal:      Right lower leg: No edema. Left lower leg: No edema. Neurological:      Motor: Weakness present. Comments: Oriented x2  3/5 bl UE  2/5 bl LE     LABS:    CBC:   Recent Labs     02/23/21  0523 02/24/21  0714 02/25/21  0605   WBC 11.9* 9.0 9.2   HGB 15.8 14.7 14.4   HCT 49.2 44.9 43.0    190 183   MCV 97.5 96.8 95.0     Renal:    Recent Labs     02/23/21  0523 02/24/21  0714 02/25/21  0605    138 137   K 4.6 4.3 3.7    103 103   CO2 24 29 27   BUN 13 16 20   CREATININE 0.6* 0.7* 0.7*   GLUCOSE 84 90 85   CALCIUM 9.0 9.1 8.8   ANIONGAP 9 6 7     Troponin:   Recent Labs     02/22/21  1545   TROPONINI <0.01     -------------------------------------  RAD:   XR CHEST PORTABLE   Final Result   1.  There are streaky bibasilar airspace opacities which are similar in comparison to July 14, 2020. This may reflect persistent scarring versus recurrent airspace disease. 2. Small right pleural effusion. .              Assessment/Plan:     Generalized weakness likely 2/2 to age-related physical debility  Patient presents with increasing weakness for the past 3 days. Works with PT at home. Unable to stand on his feet today. Endorses decreased p.o. intake. - IVF and PO hydration  - Vitamin B12, folate, vitamin D pending  - SLP, PT/OT, CW consulted  - Pt has increased risk of falls     Lower extremity pitting edema  On presentation patient's lower extremities with 2+ pitting edema. Up to the knee. Other signs of fluid overload not present. Most likely from venous insufficiency      Heart failure with preserved EF, not in acute exacerbation  Left ventricular ejection fraction 71% per stress test in October 2016. Patient takes 55 mg Lasix daily  - Daily weights  - Keep K greater than 4 and mag greater than 2  - Is and Os  - Continue home Lasix     Atrial fibrillation - continue home Eliquis  Hyperlipidemia - continue home Lipitor  BPH - continue home finasteride, Flomax        Will discuss with attending physician Dr. Anupama Rogers     Code Status:Full code  FEN: general, low sodium  PPX: eliquis  DISPO: IP     Yeyo Lo MD  2/23/2021,  10:08 AM      Addendum to Resident H& P/Progress note:  I have personally seen,examined and evaluated the patient.  I have reviewed the current history, physical findings, labs and assessment and plan and agree with note as documented by resident MD ( Navya Johnson)      Annabelle Ortez MD, [address]

## 2021-02-25 NOTE — PLAN OF CARE
D: bi LE edema with redness and scattered abrasions and bruising. Bi le washed, lotion applied, & ace wraps. Turning and repositioning on side and using chair cushion to prevent skin break down.   Problem: Skin Integrity:  Goal: Will show no infection signs and symptoms  Description: Will show no infection signs and symptoms  Outcome: Ongoing  Goal: Absence of new skin breakdown  Description: Absence of new skin breakdown  Outcome: Ongoing

## 2021-02-25 NOTE — CARE COORDINATION
Saw that palli following now. Jenni Schumacher NP -palli and asked the plan. Patient has been approved to go to [facility] and then plan to d/c home from there per family. Awaiting message from [facility] to move forward due to plans may have changed. CM will continue to follow patient until discharge.   Electronically signed by Roge Guadalupe RN on 2/25/2021 at 11:25 AM  .

## 2021-02-25 NOTE — PROGRESS NOTES
Physical Therapy  Facility/Department: [place] [address] [address] [facility]  Daily Treatment Note  NAME: Pranay Daigle  : 1927  MRN: [id number]    Date of Service: 2021    Discharge Recommendations:    Pranay Daigle scored a 7/24 on the AM-PAC short mobility form. Current research shows that an AM-PAC score of 17 or less is typically not associated with a discharge to the patient's home setting. Based on the patient's AM-PAC score and their current functional mobility deficits, it is recommended that the patient have 3-5 sessions per week of Physical Therapy at d/c to increase the patient's independence. Please see assessment section for further patient specific details. If patient discharges prior to next session this note will serve as a discharge summary. Please see below for the latest assessment towards goals. PT Equipment Recommendations  Equipment Needed: (defer to next level of care)    Assessment   Body structures, Functions, Activity limitations: Decreased functional mobility ; Decreased cognition;Decreased strength;Decreased ROM; Decreased balance;Decreased safe awareness;Decreased endurance;Decreased posture  Assessment: Pt requiring max Ax2 to stand from EOB to RW and to roxana stedy. Pt requiring dep A via stedy for bed>chair transfer this date. Pt remains well below his baseline and would benefit from further skilled PT to maximize safety and independence with functional mobility. Treatment Diagnosis: Decreased functional mobility  Patient Education: role of PT, use of call light, d/c planning - rec reinforcement  Barriers to Learning: cognition  REQUIRES PT FOLLOW UP: Yes  Activity Tolerance  Activity Tolerance: Patient limited by cognitive status; Patient limited by fatigue;Patient limited by endurance     Patient Diagnosis(es): The primary encounter diagnosis was General weakness. A diagnosis of Inability to walk was also pertinent to this visit.      has a past medical history of Atrial fibrillation [address] [address] Carotid artery disease ([address] GERD (gastroesophageal reflux disease), Headaches due to old head injury, Heart murmur, systolic, Hyperlipidemia LDL goal < 100, Hypertension, Moderate aortic insufficiency, Multi-infarct state, PUD (peptic ulcer disease), Right leg weakness, and SBO (small bowel obstruction) ([address] has a past surgical history that includes hernia repair; capsulotomy (Bilateral, 2011); skin biopsy (Right, 2012); Skin cancer excision (Right, 10/2010); vitrectomy (Right, 2007); Cataract removal with implant (Left, 10/6/2006); Colonoscopy (05); Coronary angioplasty with stent (2016); and Upper gastrointestinal endoscopy (N/A, 2020). Restrictions  Position Activity Restriction  Other position/activity restrictions: up with assist  Subjective   General  Chart Reviewed: Yes  Additional Pertinent Hx: 77-year-old male past medical history A. fib on Eliquis hyperlipidemia CAD and GERD who presented to the ED with complaints of generalized weakness. Family / Caregiver Present: No  Referring Practitioner: Samantha Robins DO  Subjective  Subjective: Pt found supine in bed upon arrival, reporting unrated HA and agreeable to therapy.           Orientation  Orientation  Overall Orientation Status: Impaired  Orientation Level: Oriented to person  Cognition      Objective   Bed mobility  Supine to Sit: Maximum assistance  Scootin Person assistance;Dependent/Total(Dep Ax2 to EOB)  Transfers  Sit to Stand: 2 Person Assistance(Max Ax2 from EOB to RW and to stedy - VC for hand placement)  Stand to sit: 2 Person Assistance(Max Ax2 to EOB and to recliner - VC for hand placement)  Bed to Chair: Dependent/Total(via stedy)           Exercises  Comments: AP x10 BLE requiring max Ax1 for LLE        AM-PAC Score  AM-PAC Inpatient Mobility Raw Score : 7 (21)  AM-PAC Inpatient T-Scale Score : 26.42 (21)  Mobility Inpatient CMS 0-100% Score: 92.36 (21 1514)  Mobility Inpatient CMS G-Code Modifier : CM (02/24/21 1514)          Goals  Short term goals  Time Frame for Short term goals: d/c  Short term goal 1: sup<>sit min Ax1 ongoing  Short term goal 2: bed<>chair min Ax1 ongoing  Short term goal 3: sit<>stand mod Ax1 ongoing  Patient Goals   Patient goals : none stated    Plan    Plan  Times per week: 2-5  Times per day: Daily  Current Treatment Recommendations: Strengthening, Balance Training, Gait Training, Functional Mobility Training, Endurance Training, Transfer Training, Safety Education & Training, Home Exercise Program  Safety Devices  Type of devices: Gait belt, Nurse notified, Chair alarm in place, Call light within reach, Left in chair     Therapy Time   Individual Concurrent Group Co-treatment   Time In 1038         Time Out 1116         Minutes 38           Timed Code Treatment Minutes:  38    Total Treatment Minutes:  38    If the patient is discharged before the next treatment session, this note will serve as the discharge summary.      Vero Hayes, PT, DPT

## 2021-02-26 ENCOUNTER — TELEPHONE (OUTPATIENT)
Dept: INTERNAL MEDICINE CLINIC | Age: 86
End: 2021-02-26

## 2021-02-26 NOTE — TELEPHONE ENCOUNTER
Pt nephew called to see what plan of care is for pt since transferred to [facility] 02/25/21. Yes

## 2021-02-26 NOTE — TELEPHONE ENCOUNTER
Spoke to nephew due to pt not on [facility] he stated that he turned in [place] forms. Looked in scanning they were in there asked to be scanned so MD can talk to nephew.        POA forms scanned

## 2021-02-27 ENCOUNTER — TELEPHONE (OUTPATIENT)
Dept: OTHER | Facility: CLINIC | Age: 86
End: 2021-02-27

## 2021-02-27 ENCOUNTER — HOSPITAL ENCOUNTER (INPATIENT)
Age: 86
LOS: 2 days | Discharge: HOSPICE | DRG: 871 | End: 2021-03-01
Attending: EMERGENCY MEDICINE | Admitting: HOSPITALIST
Payer: MEDICARE

## 2021-02-27 ENCOUNTER — APPOINTMENT (OUTPATIENT)
Dept: CT IMAGING | Age: 86
DRG: 871 | End: 2021-02-27
Payer: MEDICARE

## 2021-02-27 ENCOUNTER — APPOINTMENT (OUTPATIENT)
Dept: GENERAL RADIOLOGY | Age: 86
DRG: 871 | End: 2021-02-27
Payer: MEDICARE

## 2021-02-27 DIAGNOSIS — J18.9 PNEUMONIA DUE TO ORGANISM: Primary | ICD-10-CM

## 2021-02-27 PROBLEM — A41.9 SEPSIS (HCC): Status: ACTIVE | Noted: 2021-02-27

## 2021-02-27 LAB
A/G RATIO: 1.1 (ref 1.1–2.2)
ALBUMIN SERPL-MCNC: 3.3 G/DL (ref 3.4–5)
ALP BLD-CCNC: 221 U/L (ref 40–129)
ALT SERPL-CCNC: 16 U/L (ref 10–40)
ANION GAP SERPL CALCULATED.3IONS-SCNC: 12 MMOL/L (ref 3–16)
ANISOCYTOSIS: ABNORMAL
APTT: 37.2 SEC (ref 24.2–36.2)
AST SERPL-CCNC: 28 U/L (ref 15–37)
BACTERIA: ABNORMAL /HPF
BASE EXCESS ARTERIAL: 3.7 MMOL/L (ref -3–3)
BASE EXCESS VENOUS: 4.2 MMOL/L (ref -2–3)
BASOPHILS ABSOLUTE: 0 K/UL (ref 0–0.2)
BASOPHILS RELATIVE PERCENT: 0 %
BILIRUB SERPL-MCNC: 1.9 MG/DL (ref 0–1)
BILIRUBIN URINE: NEGATIVE
BLOOD, URINE: ABNORMAL
BUN BLDV-MCNC: 25 MG/DL (ref 7–20)
CALCIUM SERPL-MCNC: 9.8 MG/DL (ref 8.3–10.6)
CARBOXYHEMOGLOBIN ARTERIAL: 0.9 % (ref 0–1.5)
CARBOXYHEMOGLOBIN: 1.2 % (ref 0–1.5)
CHLORIDE BLD-SCNC: 98 MMOL/L (ref 99–110)
CLARITY: CLEAR
CO2: 27 MMOL/L (ref 21–32)
COLOR: YELLOW
CREAT SERPL-MCNC: 1.1 MG/DL (ref 0.8–1.3)
CREATININE URINE: 63.4 MG/DL (ref 39–259)
EKG ATRIAL RATE: 92 BPM
EKG DIAGNOSIS: NORMAL
EKG Q-T INTERVAL: 278 MS
EKG QRS DURATION: 102 MS
EKG QTC CALCULATION (BAZETT): 365 MS
EKG R AXIS: 62 DEGREES
EKG T AXIS: -19 DEGREES
EKG VENTRICULAR RATE: 104 BPM
EOSINOPHILS ABSOLUTE: 0 K/UL (ref 0–0.6)
EOSINOPHILS RELATIVE PERCENT: 0 %
EPITHELIAL CELLS, UA: ABNORMAL /HPF (ref 0–5)
GFR AFRICAN AMERICAN: >60
GFR NON-AFRICAN AMERICAN: >60
GLOBULIN: 3 G/DL
GLUCOSE BLD-MCNC: 102 MG/DL (ref 70–99)
GLUCOSE URINE: NEGATIVE MG/DL
HCO3 ARTERIAL: 27 MMOL/L (ref 21–29)
HCO3 VENOUS: 30.6 MMOL/L (ref 24–28)
HCT VFR BLD CALC: 48.6 % (ref 40.5–52.5)
HEMOGLOBIN, ART, EXTENDED: 14.6 G/DL
HEMOGLOBIN: 15.9 G/DL (ref 13.5–17.5)
INR BLD: 1.89 (ref 0.86–1.14)
KETONES, URINE: NEGATIVE MG/DL
LACTIC ACID, SEPSIS: 2.8 MMOL/L (ref 0.4–1.9)
LACTIC ACID, SEPSIS: 3.7 MMOL/L (ref 0.4–1.9)
LACTIC ACID: 2.7 MMOL/L (ref 0.4–2)
LACTIC ACID: 3.5 MMOL/L (ref 0.4–2)
LEUKOCYTE ESTERASE, URINE: ABNORMAL
LIPASE: 13 U/L (ref 13–60)
LYMPHOCYTES ABSOLUTE: 0.3 K/UL (ref 1–5.1)
LYMPHOCYTES RELATIVE PERCENT: 1 %
MCH RBC QN AUTO: 31.2 PG (ref 26–34)
MCHC RBC AUTO-ENTMCNC: 32.7 G/DL (ref 31–36)
MCV RBC AUTO: 95.6 FL (ref 80–100)
METHEMOGLOBIN ARTERIAL: 0 % (ref 0–1.4)
METHEMOGLOBIN VENOUS: 0.2 % (ref 0–1.5)
MICROSCOPIC EXAMINATION: YES
MONOCYTES ABSOLUTE: 1.2 K/UL (ref 0–1.3)
MONOCYTES RELATIVE PERCENT: 4 %
NEUTROPHILS ABSOLUTE: 28 K/UL (ref 1.7–7.7)
NEUTROPHILS RELATIVE PERCENT: 95 %
NITRITE, URINE: NEGATIVE
O2 SAT, ARTERIAL: 99 % (ref 93–100)
O2 SAT, VEN: 44 %
OVALOCYTES: ABNORMAL
PCO2 ARTERIAL: 38.3 MMHG (ref 35–45)
PCO2, VEN: 50.8 MMHG (ref 41–51)
PDW BLD-RTO: 15.2 % (ref 12.4–15.4)
PH ARTERIAL: 7.46 (ref 7.35–7.45)
PH UA: 6 (ref 5–8)
PH VENOUS: 7.39 (ref 7.35–7.45)
PLATELET # BLD: ABNORMAL K/UL (ref 135–450)
PLATELET SLIDE REVIEW: ABNORMAL
PMV BLD AUTO: ABNORMAL FL (ref 5–10.5)
PO2 ARTERIAL: 110 MMHG (ref 75–108)
PO2, VEN: 27.3 MMHG (ref 25–40)
POTASSIUM REFLEX MAGNESIUM: 4 MMOL/L (ref 3.5–5.1)
PRO-BNP: 2865 PG/ML (ref 0–449)
PROTEIN UA: NEGATIVE MG/DL
PROTHROMBIN TIME: 22.1 SEC (ref 10–13.2)
RAPID INFLUENZA  B AGN: NEGATIVE
RAPID INFLUENZA A AGN: NEGATIVE
RBC # BLD: 5.08 M/UL (ref 4.2–5.9)
RBC UA: >100 /HPF (ref 0–4)
SODIUM BLD-SCNC: 137 MMOL/L (ref 136–145)
SPECIFIC GRAVITY UA: 1.01 (ref 1–1.03)
TCO2 ARTERIAL: 28 MMOL/L
TCO2 CALC VENOUS: 32 MMOL/L
TOTAL PROTEIN: 6.3 G/DL (ref 6.4–8.2)
TROPONIN: 0.03 NG/ML
TROPONIN: 0.05 NG/ML
TROPONIN: <0.01 NG/ML
UREA NITROGEN, UR: 319.8 MG/DL (ref 800–1666)
URINE REFLEX TO CULTURE: ABNORMAL
URINE TYPE: ABNORMAL
UROBILINOGEN, URINE: 1 E.U./DL
WBC # BLD: 29.5 K/UL (ref 4–11)
WBC UA: ABNORMAL /HPF (ref 0–5)

## 2021-02-27 PROCEDURE — 83880 ASSAY OF NATRIURETIC PEPTIDE: CPT

## 2021-02-27 PROCEDURE — 6360000004 HC RX CONTRAST MEDICATION: Performed by: STUDENT IN AN ORGANIZED HEALTH CARE EDUCATION/TRAINING PROGRAM

## 2021-02-27 PROCEDURE — 85025 COMPLETE CBC W/AUTO DIFF WBC: CPT

## 2021-02-27 PROCEDURE — 87641 MR-STAPH DNA AMP PROBE: CPT

## 2021-02-27 PROCEDURE — 93005 ELECTROCARDIOGRAM TRACING: CPT | Performed by: STUDENT IN AN ORGANIZED HEALTH CARE EDUCATION/TRAINING PROGRAM

## 2021-02-27 PROCEDURE — 85610 PROTHROMBIN TIME: CPT

## 2021-02-27 PROCEDURE — 96368 THER/DIAG CONCURRENT INF: CPT

## 2021-02-27 PROCEDURE — 36600 WITHDRAWAL OF ARTERIAL BLOOD: CPT

## 2021-02-27 PROCEDURE — 82803 BLOOD GASES ANY COMBINATION: CPT

## 2021-02-27 PROCEDURE — 81001 URINALYSIS AUTO W/SCOPE: CPT

## 2021-02-27 PROCEDURE — 6360000002 HC RX W HCPCS: Performed by: STUDENT IN AN ORGANIZED HEALTH CARE EDUCATION/TRAINING PROGRAM

## 2021-02-27 PROCEDURE — 96365 THER/PROPH/DIAG IV INF INIT: CPT

## 2021-02-27 PROCEDURE — 36415 COLL VENOUS BLD VENIPUNCTURE: CPT

## 2021-02-27 PROCEDURE — 82570 ASSAY OF URINE CREATININE: CPT

## 2021-02-27 PROCEDURE — 2580000003 HC RX 258: Performed by: STUDENT IN AN ORGANIZED HEALTH CARE EDUCATION/TRAINING PROGRAM

## 2021-02-27 PROCEDURE — 83605 ASSAY OF LACTIC ACID: CPT

## 2021-02-27 PROCEDURE — 87086 URINE CULTURE/COLONY COUNT: CPT

## 2021-02-27 PROCEDURE — 84540 ASSAY OF URINE/UREA-N: CPT

## 2021-02-27 PROCEDURE — 85730 THROMBOPLASTIN TIME PARTIAL: CPT

## 2021-02-27 PROCEDURE — 80053 COMPREHEN METABOLIC PANEL: CPT

## 2021-02-27 PROCEDURE — 87804 INFLUENZA ASSAY W/OPTIC: CPT

## 2021-02-27 PROCEDURE — 2700000000 HC OXYGEN THERAPY PER DAY

## 2021-02-27 PROCEDURE — 70450 CT HEAD/BRAIN W/O DYE: CPT

## 2021-02-27 PROCEDURE — XW033N5 INTRODUCTION OF MEROPENEM-VABORBACTAM ANTI-INFECTIVE INTO PERIPHERAL VEIN, PERCUTANEOUS APPROACH, NEW TECHNOLOGY GROUP 5: ICD-10-PCS | Performed by: HOSPITALIST

## 2021-02-27 PROCEDURE — 87040 BLOOD CULTURE FOR BACTERIA: CPT

## 2021-02-27 PROCEDURE — 2060000000 HC ICU INTERMEDIATE R&B

## 2021-02-27 PROCEDURE — 83690 ASSAY OF LIPASE: CPT

## 2021-02-27 PROCEDURE — 99284 EMERGENCY DEPT VISIT MOD MDM: CPT

## 2021-02-27 PROCEDURE — 94761 N-INVAS EAR/PLS OXIMETRY MLT: CPT

## 2021-02-27 PROCEDURE — 71260 CT THORAX DX C+: CPT

## 2021-02-27 PROCEDURE — 71045 X-RAY EXAM CHEST 1 VIEW: CPT

## 2021-02-27 PROCEDURE — 84484 ASSAY OF TROPONIN QUANT: CPT

## 2021-02-27 PROCEDURE — 96366 THER/PROPH/DIAG IV INF ADDON: CPT

## 2021-02-27 RX ORDER — SODIUM CHLORIDE, SODIUM LACTATE, POTASSIUM CHLORIDE, CALCIUM CHLORIDE 600; 310; 30; 20 MG/100ML; MG/100ML; MG/100ML; MG/100ML
INJECTION, SOLUTION INTRAVENOUS CONTINUOUS
Status: ACTIVE | OUTPATIENT
Start: 2021-02-27 | End: 2021-02-28

## 2021-02-27 RX ORDER — SODIUM CHLORIDE 0.9 % (FLUSH) 0.9 %
10 SYRINGE (ML) INJECTION PRN
Status: DISCONTINUED | OUTPATIENT
Start: 2021-02-27 | End: 2021-03-01 | Stop reason: HOSPADM

## 2021-02-27 RX ORDER — FINASTERIDE 5 MG/1
5 TABLET, FILM COATED ORAL DAILY
Status: DISCONTINUED | OUTPATIENT
Start: 2021-02-27 | End: 2021-03-01 | Stop reason: HOSPADM

## 2021-02-27 RX ORDER — ACETAMINOPHEN 650 MG/1
650 SUPPOSITORY RECTAL EVERY 6 HOURS PRN
Status: DISCONTINUED | OUTPATIENT
Start: 2021-02-27 | End: 2021-03-01 | Stop reason: HOSPADM

## 2021-02-27 RX ORDER — HEPARIN SODIUM 1000 [USP'U]/ML
40 INJECTION, SOLUTION INTRAVENOUS; SUBCUTANEOUS PRN
Status: DISCONTINUED | OUTPATIENT
Start: 2021-02-27 | End: 2021-02-28 | Stop reason: ALTCHOICE

## 2021-02-27 RX ORDER — HEPARIN SODIUM 1000 [USP'U]/ML
80 INJECTION, SOLUTION INTRAVENOUS; SUBCUTANEOUS ONCE
Status: COMPLETED | OUTPATIENT
Start: 2021-02-27 | End: 2021-02-27

## 2021-02-27 RX ORDER — ACETAMINOPHEN 325 MG/1
650 TABLET ORAL EVERY 6 HOURS PRN
Status: DISCONTINUED | OUTPATIENT
Start: 2021-02-27 | End: 2021-03-01 | Stop reason: HOSPADM

## 2021-02-27 RX ORDER — 0.9 % SODIUM CHLORIDE 0.9 %
30 INTRAVENOUS SOLUTION INTRAVENOUS ONCE
Status: COMPLETED | OUTPATIENT
Start: 2021-02-27 | End: 2021-02-27

## 2021-02-27 RX ORDER — ATORVASTATIN CALCIUM 80 MG/1
80 TABLET, FILM COATED ORAL NIGHTLY
Status: DISCONTINUED | OUTPATIENT
Start: 2021-02-27 | End: 2021-03-01 | Stop reason: HOSPADM

## 2021-02-27 RX ORDER — HEPARIN SODIUM 10000 [USP'U]/100ML
20 INJECTION, SOLUTION INTRAVENOUS CONTINUOUS
Status: DISCONTINUED | OUTPATIENT
Start: 2021-02-27 | End: 2021-02-28 | Stop reason: ALTCHOICE

## 2021-02-27 RX ORDER — TAMSULOSIN HYDROCHLORIDE 0.4 MG/1
0.4 CAPSULE ORAL DAILY
Status: DISCONTINUED | OUTPATIENT
Start: 2021-02-27 | End: 2021-03-01 | Stop reason: HOSPADM

## 2021-02-27 RX ORDER — SODIUM CHLORIDE 0.9 % (FLUSH) 0.9 %
10 SYRINGE (ML) INJECTION EVERY 12 HOURS SCHEDULED
Status: DISCONTINUED | OUTPATIENT
Start: 2021-02-27 | End: 2021-03-01 | Stop reason: HOSPADM

## 2021-02-27 RX ORDER — HEPARIN SODIUM 1000 [USP'U]/ML
80 INJECTION, SOLUTION INTRAVENOUS; SUBCUTANEOUS PRN
Status: DISCONTINUED | OUTPATIENT
Start: 2021-02-27 | End: 2021-02-28 | Stop reason: ALTCHOICE

## 2021-02-27 RX ADMIN — HEPARIN SODIUM 18 UNITS/KG/HR: 10000 INJECTION, SOLUTION INTRAVENOUS at 23:21

## 2021-02-27 RX ADMIN — CEFEPIME HYDROCHLORIDE 1000 MG: 1 INJECTION, POWDER, FOR SOLUTION INTRAMUSCULAR; INTRAVENOUS at 11:31

## 2021-02-27 RX ADMIN — MEROPENEM 1000 MG: 1 INJECTION, POWDER, FOR SOLUTION INTRAVENOUS at 18:57

## 2021-02-27 RX ADMIN — IOPAMIDOL 80 ML: 755 INJECTION, SOLUTION INTRAVENOUS at 12:36

## 2021-02-27 RX ADMIN — VANCOMYCIN HYDROCHLORIDE 1250 MG: 10 INJECTION, POWDER, LYOPHILIZED, FOR SOLUTION INTRAVENOUS at 11:59

## 2021-02-27 RX ADMIN — HEPARIN SODIUM 4730 UNITS: 1000 INJECTION, SOLUTION INTRAVENOUS; SUBCUTANEOUS at 23:22

## 2021-02-27 RX ADMIN — SODIUM CHLORIDE 1758 ML: 9 INJECTION, SOLUTION INTRAVENOUS at 11:31

## 2021-02-27 RX ADMIN — SODIUM CHLORIDE, POTASSIUM CHLORIDE, SODIUM LACTATE AND CALCIUM CHLORIDE: 600; 310; 30; 20 INJECTION, SOLUTION INTRAVENOUS at 18:57

## 2021-02-27 ASSESSMENT — PAIN SCALES - WONG BAKER: WONGBAKER_NUMERICALRESPONSE: 0

## 2021-02-27 NOTE — ED NOTES
Attempted to call report. RN not available. Left a message with the unit coordinator requesting call back.      Tata Barton RN  02/27/21 4257

## 2021-02-27 NOTE — PROGRESS NOTES
4 Eyes Admission Assessment     I agree as the admission nurse that 2 RN's have performed a thorough Head to Toe Skin Assessment on the patient. ALL assessment sites listed below have been assessed on admission. Excoriation and redness to buttocks and scrotum. Swelling of scrotum and BLE's. Areas assessed by both nurses:   [x]   Head, Face, and Ears   [x]   Shoulders, Back, and Chest  [x]   Arms, Elbows, and Hands   [x]   Coccyx, Sacrum, and Ischium  [x]   Legs, Feet, and Heels        Does the Patient have Skin Breakdown?   No       STAGE I ON COCCYX  Georges Prevention initiated:  Yes   Wound Care Orders initiated:  NA      [facility] nurse consulted for Pressure Injury (Stage 3,4, Unstageable, DTI, NWPT, and Complex wounds) or Georges score 18 or lower:  NA      Nurse 1 eSignature: Electronically signed by Megan Christiansen RN on 2/27/21 at 5:09 PM EST    **SHARE this note so that the co-signing nurse is able to place an eSignature**    Nurse 2 eSignature: Electronically signed by Dominga Cano RN on 2/28/21 at 7:32 AM EST

## 2021-02-27 NOTE — ED PROVIDER NOTES
ED Attending Attestation Note     Date of evaluation: 2/27/2021    This patient was seen by the resident. I have seen and examined the patient, agree with the workup, evaluation, management and diagnosis. The care plan has been discussed. I have reviewed the ECG and concur with the resident's interpretation. My assessment reveals here for SOB and low pulse ox. On physical exam patient has a temp of 100.6 and rhonchi bilaterally. Patient had very elevated white count at 29,000. He also had a elevated lactate at 3.7.   Troponin elevated at 0.05.     Sukhjinder Duong MD  02/27/21 4750

## 2021-02-27 NOTE — ED NOTES
Pt presents to the ED from [facility]. Per nursing staff pt was taking morning medications crushed in applesauce when he started coughing and choking, pts SpO2 dropped to 83% on RA, thus calling 911 for evaluation. Upon arrival pt coughing continuously suctioned with lubna in the back of his throat for a large amount of thick yellow sputum. Pt alert, moving x4 extremities, moaning and coughing. Nursing staff at facility unable to tell RN pts baseline mental status as pt only arrived to them yesterday.       Albino Schilling, ETHAN  02/27/21 6680

## 2021-02-27 NOTE — ED NOTES
Report called to ETHAN Wright on PCU. Pt will be transported to room 4460.       Joe Perez RN  02/27/21 8541

## 2021-02-27 NOTE — CONSULTS
Clinical Pharmacy Consult Note    Admit date: 2/27/2021    Subjective/Objective:  81 yo male with PMHx that includes Afib (Eliquis), HFpEF, HLD, CAD, BPH, and GERD who is admitted with SOB, hypoxia, and productive cough. Patient is receiving vancomycin and meropenem for severe sepsis 2/2 suspected PNA. Pharmacy is consulted to dose Vancomycin per Dr. Sanket Mccauley    Pertinent Medications:  Vancomycin -- Pharmacy to dose -- day # 1   1st dose: 1.25g IV x1 (2/27 @ 1159)  Meropenem 1g IV q12h (renally dosed) -- day # 1    Recent Labs     02/25/21  0605 02/27/21  1112    137   K 3.7 4.0    98*   CO2 27 27   BUN 20 25*   CREATININE 0.7* 1.1       Estimated Creatinine Clearance: 35 mL/min (based on SCr of 1.1 mg/dL). Recent Labs     02/25/21  0605 02/27/21  1112   WBC 9.2 29.5*   HGB 14.4 15.9   HCT 43.0 48.6   MCV 95.0 95.6    see below       Height:  5' 9\" (175.3 cm)  Weight: 130 lb 4.7 oz (59.1 kg)    Micro:  Date Site Micro Susceptibility   2/27 Blood x2 Pending    2/27 MRSA nasal Pending    2/27 Rapid flu Pending    2/27 Urine Pending        Assessment/Plan:  1. Severe sepsis 2/2 suspected PNA:  Vancomycin and meropenem - day # 1  Vancomycin  · Patient received vancomycin 1.25g IV x1 today in the emergency department. Given patient's renal function, this dose will be sufficient for today  · Start vancomycin 1g IV q24h tomorrow. · Targeting vancomycin trough 15-20 mcg/mL   · Clinical pharmacist will follow-up in AM.  · Renal function will be monitored closely and dosing will be adjusted as appropriate. Please call with any questions. Thank you for consulting pharmacy!   Oxana Serna, PharmD, [address] [place] [address] Street: [phone]  2/27/2021 5:27 PM

## 2021-02-27 NOTE — ED NOTES
ICU MD's concerned that the patient did not receive Vancomycin after seeing fluid on the floor. Spoke with patient's RN, this is not the case. Patient received all of his Vancomycin and Fluids.       Candice Litten, RN  02/27/21 7826

## 2021-02-27 NOTE — CONSULTS
Clinical Pharmacy Progress Note    Admit date: 2/27/2021   . Pharmacy is consulted to dose Vancomycin x1 dose in ED per Dr. Marta Allison. Height:  5 ft 9 in  Weight:  58.6 kg      Assessment/Plan:  · Will order Vancomycin 1250mg x1 for administration in ED per ER pharmacy consult. · If Vancomycin is to continue on admission and pharmacy is to manage dosing, please re-consult with admission orders.     Please call with questions--  Thanks--  Baby Adjutant, PharmD, BCPS, BCGP  [id number] ([facility])   2/27/2021 10:56 AM

## 2021-02-27 NOTE — H&P
Internal Medicine PGY-1 Resident History & Physical      PCP: Sissy Joe MD    Date of Admission: 2/27/2021    Date of Service: Pt seen/examined on 2/27/21 and Admitted to Inpatient with expected LOS greaterthan two midnights due to medical therapy. Chief Complaint:  SOB, productive cough    History Of Present Illness:      80 y.o. male with PMH A. fib on Eliquis, HFPEF, hyperlipidemia, CAD BPH, and GERD who [facility] from twin [address] with shortness of breath, hypoxia and productive cough. Patient was recently discharged from the [facility] on 2/25 where he was diagnosed with age-related physical debility. He was subsequently sent to the nursing facility for further rehab. Patient is not verbal and encephalopathic on presentation. He was not able to provide any history so the nursing facility was called. They report that this morning around breakfast time he was choking. He had a deep productive cough and was gasping. His blood pressure was stable but he was satting 85% on room air and down to 78 to 79% while coughing. Nurse noted decreased airflow on the left and crackles on the right. She states that prior to this patient was responding to commands but was not really verbal.  Apparently yesterday he was answering questions appropriately and was alert and oriented x2. In the ED he was febrile to 100.6 °F, tachypneic to 25, satting 99% on 3 L nasal cannula. Labs significant for leukocytosis of 29.5, creatinine of 1.1 (baseline 0.7), lactic acid of 3.7 (later 2.8 after fluids), BNP 2865, troponin 0 0.05, UA with negative nitrite, small leukocyte esterase, 6-9 white blood cells and 4+ bacteria. EKG with previously demonstrated incomplete right bundle branch block but new onset rapid ventricular response for A. Fib. CXR negative for acute cardiopulmonary disease.   CT chest with airspace in the lower lobes in the left upper lobe, mild mediastinal adenopathy likely reactive. CT head with multiple remote infarcts including basal ganglia lacunar infarct but no acute intracranial abnormality. Patient will be admitted for further work-up of severe sepsis secondary pneumonia and acute toxic metabolic encephalopathy. Past Medical History:          Diagnosis Date    Atrial fibrillation ([address] 05/2016    Carotid artery disease (HCC)     R>L    GERD (gastroesophageal reflux disease) 2/21/2013    Headaches due to old head injury 2010    Heart murmur, systolic 9/1/4358    8/5/41 Echo at [facility] [place]: Normal left ventricle size and systolic function with an estimated ejection fraction of 55%. Concentric left ventricular hypertrophy is present. No regional wall motion abnormalities are seen. There is reversal of E/A inflow velocities across the mitral valve suggesting impaired left ventricular relaxation. Aortic valve appears sclerotic but opens adequately. Moderate a    Hyperlipidemia LDL goal < 100 2/21/2013    Hypertension     Moderate aortic insufficiency 1/9/2014 1/9/14 Echo at [facility] [place]: Normal left ventricle size and systolic function with an estimated ejection fraction of 55%. Concentric left ventricular hypertrophy is present. No regional wall motion abnormalities are seen. There is reversal of E/A inflow velocities across the mitral valve suggesting impaired left ventricular relaxation. Aortic valve appears sclerotic but opens adequately. 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 Weakness substantially exacerbated after left anterior frontal lobe intraparenchymal bleed 5/4/16 [facility] admission.     SBO (small bowel obstruction) ([facility] 08/2018    resolved conservative tx     Past Surgical History:          Procedure Laterality Date    CAPSULOTOMY Bilateral 11/2011    [facility] - Dr. Shirlene Lee, YAG capsulotomies for secondary cataracts    CATARACT REMOVAL WITH IMPLANT Left 10/6/2006    with vitrectomy & membrane peel [facility] - Dr Marroquin Needle and Liu Antis    COLONOSCOPY  1/11/05    diverticulosis, no polyps Dr. Taylor. No re-screening needed per Dr. Taylor.  CORONARY ANGIOPLASTY WITH STENT PLACEMENT  11/2016    PDA     HERNIA REPAIR      SKIN BIOPSY Right 11/2012    Right ear keloid - Dr. Enrique Delvalle Right 10/2010    Squamous cell - right ear - Dr. Lexie Huerta N/A 1/6/2020    EGD DIAGNOSTIC ONLY performed by Sally Murphy MD at [address] [facility] Right 6/6/2007    with membrane peel - eye [facility] - Dr Lopez Amezcua       Medications Prior to Admission:      Prior to Admission medications    Medication Sig Start Date End Date Taking? Authorizing Provider   traMADol (ULTRAM) 50 MG tablet Take 50 mg by mouth every 6 hours as needed for Pain.     Historical Provider, MD   furosemide (LASIX) 40 MG tablet TAKE 1 & 1/2 (ONE & ONE-HALF) TABLETS BY MOUTH ONCE DAILY IN THE MORNING 2/19/21   Trevon Pimentel MD   tamsulosin (FLOMAX) 0.4 MG capsule TAKE 1 CAPSULE BY MOUTH ONCE DAILY IN THE EVENING FOR PROSTATE AND URINATION 2/19/21   Trevon Pimentel MD   apixaban Klarissa Pop) 2.5 MG TABS tablet Take 1 tablet by mouth 2 times daily 9/23/20   Trevon Pimentel MD   finasteride (PROSCAR) 5 MG tablet Take 1 tablet by mouth daily 9/23/20   Trevon Pimentel MD   acetaminophen (TYLENOL) 325 MG tablet Take 2 tablets by mouth every 6 hours as needed for Pain 1/9/20   Florencia Dow MD   potassium chloride (KLOR-CON M) 10 MEQ extended release tablet Take 2 tablets by mouth daily 10/14/19   Trevon Pimentel MD   atorvastatin (LIPITOR) 80 MG tablet TAKE 1 TABLET BY MOUTH ONE TIME A DAY  7/30/19   Trevon Pimentel MD   vitamin B-12 (CYANOCOBALAMIN) 1000 MCG tablet Take OTC B12 or similar B-complex vitamins 5nc=6051 mcg a day for neurological health and as a natural energy booster (read a bottle of 5 Hour Energy or diet Red Bull). High B12 levels are proven to help prevent dementia & neuropathy. Patient taking differently: Take 1,000 mcg by mouth daily Take OTC B12 or similar B-complex vitamins 9ep=4048 mcg a day for neurological health and as a natural energy booster (read a bottle of 5 Hour Energy or diet Red Bull). High B12 levels are proven to help prevent dementia & neuropathy. 5/20/16   Kay Stewart MD   omeprazole (PRILOSEC OTC) 20 MG tablet 1 daily to protect stomach. 5/20/16   Kay Stewart MD   polyethylene glycol [facility]) powder Take as directed every other day for constipation 9/23/13   Kay Stewart MD       Allergies:  Aspirin    Social History:      The patient currently at [facility]:   reports that he has never smoked. He has never used smokeless tobacco.  ETOH:  reports current alcohol use of about 1.0 standard drinks of alcohol per week. Family History:     History reviewed. No pertinent family history. REVIEW OF SYSTEMS: Pertinent positives as noted in the HPI. All other systems reviewed and negative. ROS: Review of Systems    PHYSICAL EXAM PERFORMED:    /72   Pulse 97   Temp 100.6 °F (38.1 °C) (Rectal)   Resp 25   Ht 5' 9\" (1.753 m)   Wt 128 lb 6.4 oz (58.2 kg)   SpO2 99%   BMI 18.96 kg/m²     General appearance: Patient not responding to questions and not participating in physical exam.  Increased secretions noted as well as difficulty clearing them. In distress. HEENT:  Normal cephalic,atraumatic without obvious deformity. Pupils equal, round, and reactive to light. Extra ocular muscles intact. Conjunctivae/corneas clear. Neck: Supple, with full range of motion. No jugular venous distention. Trachea midline. Respiratory: Increased secretions. Diffuse bilateral rhonchi. Cardiovascular:  Regular rate and rhythm with normal S1/S2 without murmurs, rubs or gallops.   Abdomen: Soft, non-tender, non-distended with normal bowel sounds. Musculoskeletal:  No clubbing, cyanosis bilaterally. 3+ pitting edema in bilateral lower extremities. Skin: Skin color, texture, turgor normal.  Norashes or lesions. Neurologic: Not responding to commands, not answering simple questions. Psychiatric: Not responding to commands, not answering questions. Capillary Refill: Brisk,< 3 seconds   Peripheral Pulses: +2 palpable, equal bilaterally       Labs:     Recent Labs     02/25/21  0605 02/27/21  1112   WBC 9.2 29.5*   HGB 14.4 15.9   HCT 43.0 48.6    see below     Recent Labs     02/25/21  0605 02/27/21  1112    137   K 3.7 4.0    98*   CO2 27 27   BUN 20 25*   CREATININE 0.7* 1.1   CALCIUM 8.8 9.8     Recent Labs     02/27/21  1112   AST 28   ALT 16   BILITOT 1.9*   ALKPHOS 221*     No results for input(s): INR in the last 72 hours. Recent Labs     02/27/21  1112 02/27/21  1310   TROPONINI 0.05* <0.01       Urinalysis:      Lab Results   Component Value Date    NITRU Negative 02/27/2021    WBCUA 6-9 02/27/2021    BACTERIA 4+ 02/27/2021    RBCUA >100 02/27/2021    BLOODU LARGE 02/27/2021    SPECGRAV 1.015 02/27/2021    GLUCOSEU Negative 02/27/2021       Radiology:     CT HEAD WO CONTRAST   Final Result      1. No findings for acute intracranial abnormality. 2.  Age-related atrophy with extensive periventricular deep white matter changes bilaterally consistent with chronic small vessel ischemia. 3.  Multiple remote infarcts as described above. 4.  Remote bilateral basal ganglia lacunar infarcts. IMPRESSION:      CT CHEST ABDOMEN PELVIS W CONTRAST   Final Result      CHEST:      1. Airspace disease in the lower lobes as well as the left upper lobe. This could reflect new infection within the lung parenchyma. The areas of airspace disease in the right upper lobe and right middle lobe seen on prior examination has resolved.    2. Mild mediastinal adenopathy, likely II  Troponin 0 0.05 admission, on repeat 0.01    MILADY secondary to volume depletion  Baseline creatinine 0.7 on 2/21. Creatinine 1.1 on admission. BUN:Cr > 20, likely prerenal in etiology secondary to decreased p.o. intake. -LR continuous fluids for 10 hours  -Monitor renal function  -Avoid nephrotoxic medications and contrast if possible     Bilateral lower extremity pitting edema  3+ pitting edema bilateral lower extremities. Likely secondary to venous insufficiency. Last echo on 2/2021 revealed EF greater than 65%, no regional wall motion abnormalities, and estimated pulmonary systolic pressure of 65HCQY  -Strict I's and O's  -Daily weights    Atrial fibrillation - continue home Eliquis  Hyperlipidemia - continue home Lipitor  BPH - continue home finasteride, Flomax    DVT Prophylaxis: Apixaban  Diet: No diet orders on file  Code Status: Prior    PT/OT Eval Status: Ordered    Dispo -PCU, patient high risk for need of intubation and transfer to ICU. I will discuss the patient with the senior resident and Dr. Anupama Rogers. Gini Lerma,   Internal Medicine Resident, PGY-1    Addendum to Resident H& P/Progress note:  I have personally seen,examined and evaluated the patient.  I have reviewed the current history, physical findings, labs and assessment and plan and agree with note as documented by resident MD ( )      Annabelle Ortez MD, [facility]

## 2021-02-28 LAB
ANION GAP SERPL CALCULATED.3IONS-SCNC: 11 MMOL/L (ref 3–16)
ANTI-XA UNFRAC HEPARIN: >1.8 IU/ML (ref 0.3–0.7)
BASOPHILS ABSOLUTE: 0.1 K/UL (ref 0–0.2)
BASOPHILS RELATIVE PERCENT: 0.3 %
BUN BLDV-MCNC: 23 MG/DL (ref 7–20)
CALCIUM SERPL-MCNC: 9.3 MG/DL (ref 8.3–10.6)
CHLORIDE BLD-SCNC: 105 MMOL/L (ref 99–110)
CO2: 26 MMOL/L (ref 21–32)
CREAT SERPL-MCNC: 0.9 MG/DL (ref 0.8–1.3)
EOSINOPHILS ABSOLUTE: 0 K/UL (ref 0–0.6)
EOSINOPHILS RELATIVE PERCENT: 0.1 %
GFR AFRICAN AMERICAN: >60
GFR NON-AFRICAN AMERICAN: >60
GLUCOSE BLD-MCNC: 98 MG/DL (ref 70–99)
HCT VFR BLD CALC: 45.3 % (ref 40.5–52.5)
HEMOGLOBIN: 14.9 G/DL (ref 13.5–17.5)
LACTIC ACID, SEPSIS: 1.7 MMOL/L (ref 0.4–1.9)
LACTIC ACID: 1.7 MMOL/L (ref 0.4–2)
LACTIC ACID: 2 MMOL/L (ref 0.4–2)
LACTIC ACID: 2 MMOL/L (ref 0.4–2)
LYMPHOCYTES ABSOLUTE: 1 K/UL (ref 1–5.1)
LYMPHOCYTES RELATIVE PERCENT: 6.2 %
MAGNESIUM: 1.7 MG/DL (ref 1.8–2.4)
MCH RBC QN AUTO: 31.6 PG (ref 26–34)
MCHC RBC AUTO-ENTMCNC: 32.8 G/DL (ref 31–36)
MCV RBC AUTO: 96.3 FL (ref 80–100)
MONOCYTES ABSOLUTE: 1 K/UL (ref 0–1.3)
MONOCYTES RELATIVE PERCENT: 6.3 %
NEUTROPHILS ABSOLUTE: 14.1 K/UL (ref 1.7–7.7)
NEUTROPHILS RELATIVE PERCENT: 87.1 %
PDW BLD-RTO: 15.4 % (ref 12.4–15.4)
PLATELET # BLD: 182 K/UL (ref 135–450)
PMV BLD AUTO: 8.2 FL (ref 5–10.5)
POTASSIUM REFLEX MAGNESIUM: 3.8 MMOL/L (ref 3.5–5.1)
RBC # BLD: 4.7 M/UL (ref 4.2–5.9)
REJECTED TEST: NORMAL
SODIUM BLD-SCNC: 142 MMOL/L (ref 136–145)
URINE CULTURE, ROUTINE: NORMAL
WBC # BLD: 16.2 K/UL (ref 4–11)

## 2021-02-28 PROCEDURE — 6360000002 HC RX W HCPCS: Performed by: STUDENT IN AN ORGANIZED HEALTH CARE EDUCATION/TRAINING PROGRAM

## 2021-02-28 PROCEDURE — 36415 COLL VENOUS BLD VENIPUNCTURE: CPT

## 2021-02-28 PROCEDURE — 80048 BASIC METABOLIC PNL TOTAL CA: CPT

## 2021-02-28 PROCEDURE — 2580000003 HC RX 258: Performed by: STUDENT IN AN ORGANIZED HEALTH CARE EDUCATION/TRAINING PROGRAM

## 2021-02-28 PROCEDURE — 85520 HEPARIN ASSAY: CPT

## 2021-02-28 PROCEDURE — 2700000000 HC OXYGEN THERAPY PER DAY

## 2021-02-28 PROCEDURE — 99223 1ST HOSP IP/OBS HIGH 75: CPT | Performed by: HOSPITALIST

## 2021-02-28 PROCEDURE — 83605 ASSAY OF LACTIC ACID: CPT

## 2021-02-28 PROCEDURE — 92610 EVALUATE SWALLOWING FUNCTION: CPT

## 2021-02-28 PROCEDURE — 94760 N-INVAS EAR/PLS OXIMETRY 1: CPT

## 2021-02-28 PROCEDURE — 85025 COMPLETE CBC W/AUTO DIFF WBC: CPT

## 2021-02-28 PROCEDURE — 2060000000 HC ICU INTERMEDIATE R&B

## 2021-02-28 PROCEDURE — 83735 ASSAY OF MAGNESIUM: CPT

## 2021-02-28 RX ORDER — SODIUM CHLORIDE, SODIUM LACTATE, POTASSIUM CHLORIDE, CALCIUM CHLORIDE 600; 310; 30; 20 MG/100ML; MG/100ML; MG/100ML; MG/100ML
INJECTION, SOLUTION INTRAVENOUS CONTINUOUS
Status: ACTIVE | OUTPATIENT
Start: 2021-02-28 | End: 2021-03-01

## 2021-02-28 RX ORDER — POTASSIUM CHLORIDE 7.45 MG/ML
10 INJECTION INTRAVENOUS
Status: COMPLETED | OUTPATIENT
Start: 2021-02-28 | End: 2021-02-28

## 2021-02-28 RX ADMIN — POTASSIUM CHLORIDE 10 MEQ: 10 INJECTION, SOLUTION INTRAVENOUS at 09:02

## 2021-02-28 RX ADMIN — MEROPENEM 1000 MG: 1 INJECTION, POWDER, FOR SOLUTION INTRAVENOUS at 05:11

## 2021-02-28 RX ADMIN — POTASSIUM CHLORIDE 10 MEQ: 10 INJECTION, SOLUTION INTRAVENOUS at 11:31

## 2021-02-28 RX ADMIN — MEROPENEM 1000 MG: 1 INJECTION, POWDER, FOR SOLUTION INTRAVENOUS at 18:42

## 2021-02-28 RX ADMIN — SODIUM CHLORIDE, POTASSIUM CHLORIDE, SODIUM LACTATE AND CALCIUM CHLORIDE: 600; 310; 30; 20 INJECTION, SOLUTION INTRAVENOUS at 11:31

## 2021-02-28 RX ADMIN — Medication 10 ML: at 09:08

## 2021-02-28 RX ADMIN — HEPARIN SODIUM 2360 UNITS: 1000 INJECTION, SOLUTION INTRAVENOUS; SUBCUTANEOUS at 07:01

## 2021-02-28 RX ADMIN — Medication 10 ML: at 20:39

## 2021-02-28 RX ADMIN — VANCOMYCIN HYDROCHLORIDE 1000 MG: 10 INJECTION, POWDER, LYOPHILIZED, FOR SOLUTION INTRAVENOUS at 13:26

## 2021-02-28 ASSESSMENT — PAIN SCALES - WONG BAKER
WONGBAKER_NUMERICALRESPONSE: 0

## 2021-02-28 NOTE — PROGRESS NOTES
Spoke with Dr. Tyson Sue. Unable to place NG tube. Will stop P.O. Eliquis and initiate Heparin gtt as ordered.

## 2021-02-28 NOTE — PROGRESS NOTES
Patient unable to swallow pills at this time, not alert enough. Perfect served resident on call Dr. Simon Rios. Patient may need a different blood thinner, he is unable to swallow his eliquis. Report given to Nilo Banks, night shift RN.  Massimo Vides RN

## 2021-02-28 NOTE — PROGRESS NOTES
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  ONSET DATE: h/o dysphagia since     Recent Chest Xray/CT of Chest: (21)  1. Airspace disease in the lower lobes as well as the left upper lobe. This could reflect new infection within the lung parenchyma. The areas of airspace disease in the right upper lobe and right middle lobe seen on prior examination has resolved. 2. Mild mediastinal adenopathy, likely reactive. 3. Cardiomegaly and pulmonary arterial enlargement, stable. Previous MBS Results (20)  Pt exhibiting mod pharyngeal phase dysphagia. Puree and nectar liquids were consumed with no penetration or aspiration, mild residue noted in pyriforms. Decreased laryngeal elevation and reduced epiglottal closure resulted in silent aspiration of thin liquids via straw, during the swallow. Intermittent penetration/ aspiration was identified with thin via cup, occurring after the swallow, spilling over from residue in pyriforms, again with no cough occurring.   Pt with decreased penetration with smaller volume, however question if pt would be able to complete this consistently without cues. Head turn to right attempted with thin via cup, resulting in increased amount of pooling and residue with aspiration after the swallow. Head turn to left did not result in increased residue, however bolus cont to result in mod-deep penetration that did not clear. Pt not able to produce effective volitional cough to clear. Pt demonstrated adequate mastication with solid texture, with mod residue again noted in pyriforms. Additional trials of nectar liquid cleared most of residue and did not result in penetration or aspiration. Date of Eval: 2/28/2021  Evaluating Therapist: Linda Soni    Current Diet level:  Current Diet : NPO  Current Liquid Diet : NPO      Primary Complaint  Patient Complaint: \"I need my teeth brushed\"    Pain:  Endorsed odynophagia x 1 but otherwise denied    Reason for Referral  Naila Marquez was referred for a bedside swallow evaluation to assess the efficiency of his swallow function, identify signs and symptoms of aspiration and make recommendations regarding safe dietary consistencies, effective compensatory strategies, and safe eating environment. Impression  Dysphagia Diagnosis: Suspected needs further assessment(oropharyngeal dysphagia)  Dysphagia Impression : Pt with known h/o significant dysphagia with previous recommendations for thickened liquids d/t aspiration of thin liquids. Pt presents with continued indicators of dysphagia bedside. Oral phase WFL for ice chips, liquids, and puree. Solids beyond puree not trialed d/t indicators of pharyngeal dysfunction. Pt with immediate reactive coughing in response to thin liquids via cup edge. Pt initially consumed nectar thick liquids and puree w/o overt s/s associated wtih aspiration but as trials progressed, pt with increasing dyspnea, frequent congested coughing, audible swallows, and multiple swallows with all.  Pt's presentation would be c/w non-clearing pharyngeal residue and / or backflow. Repeat MBS would be warranted for full assessment of swallow if pt / family continue to want aggressive tx. NPO + ice chips + oral care recommended until MBS can be completed (pt currently in COVID r/o) vs allowing pt to eat PO of pt / family choice for QOL with knowledge of aspiration risk if comfort measures desired. Will f/u as appropriate. Dysphagia Outcome Severity Scale: Level 2: Moderate Severe dysphagia- Maximum assistance or maximum use of strategies with partial PO only     Treatment Plan  Requires SLP Intervention: Yes  Duration/Frequency of Treatment: 3-5x/wk for LOS pending family decision for aggressive tx vs comfort care of dysphagia  D/C Recommendations: 24 hour supervision/assistance       Recommended Diet and Intervention  Diet Solids Recommendation: NPO + ice chips vs PO of pt / family choice for QOL  Liquid Consistency Recommendation: NPO + ice chips vs PO of pt / family choice for QOL  Recommended Form of Meds: Via alternative means of nutrition  Recommendations: Consider ice chips PRN;Dysphagia treatment; Modified barium swallow study  Therapeutic Interventions: Therapeutic PO trials with SLP;Patient/Family education    Compensatory Swallowing Strategies  Compensatory Swallowing Strategies: Upright as possible for all oral intake; Alternate solids and liquids;Eat/Feed slowly; Small bites/sips;Swallow 2 times per bite/sip; Assist feed(if PO elected)    Treatment/Goals  Dysphagia Goals: The patient will tolerate repeat BSE when able. ;The patient will tolerate instrumental swallowing procedure    Pt goal: to go bowling this afternoon    General  Chart Reviewed: Yes  Comments: Pt presented from [facility] 2/27/21 s/p SOB and hypoxia with concerns for PNA. Pt reported to having coughing / choking on meds crushed in applesauce at facility. Unable to place NG yesterday per chart.  PMHx significant for dysphagia, CVA, GERD, and head injury. Subjective  Subjective: Pt up in bed, pleasant and cooperative. Confused - reported \"I'm going bowling this afternoon. \"  Behavior/Cognition: Alert; Cooperative;Pleasant mood;Confused  Temperature Spikes Noted: Yes(febrile upon admission)  Respiratory Status: O2 via nasual cannula  Breath Sounds: Rhonchi(per chart)  O2 Device: Nasal cannula  Communication Observation: Dysarthria(d/t missing dentition)  Follows Directions: Simple  Dentition: Some missing teeth(minimal dentition)  Patient Positioning: Upright in bed  Baseline Vocal Quality: Normal  Volitional Cough: Congested  Prior Dysphagia History: Extensive hx of dysphagia with multiple MBSs completed since 2016. Most recent completed in January 2020 recommended dysphagia minced and moist + nectar thick liquids. During pt's recent admission 2/22/21, pt only seen bedside by dysphagia team with recs for dysphagia soft and bite sized + thin liquids given pt dislike of thickened liquids and reduced adherance to SLP recs. Recommendation made at that time for palliative care consult to determine goals of care and if repeat MBS warranted. Consistencies Administered: Dysphagia Pureed (Dysphagia I); Thin - cup;Nectar - cup;Nectar - straw; Ice Chips           Vision/Hearing  Vision  Vision: Within Functional Limits  Hearing  Hearing: Exceptions to [facility]  Hearing Exceptions: Hard of hearing/hearing concerns    Oral Motor Deficits       Oral Phase Dysfunction  Oral Phase  Oral Phase: WFL  Oral Phase  Oral Phase - Comment: Assessment limited to liquids and puree     Indicators of Pharyngeal Phase Dysfunction   Pharyngeal Phase  Pharyngeal Phase: Exceptions  Indicators of Pharyngeal Phase Dysfunction  Wet Vocal Quality: All  Cough - Immediate: Thin - cup  Cough - Delayed: All  Change in Vital Signs:  All    Prognosis  Prognosis  Prognosis for safe diet advancement: poor  Barriers to reach goals: age;severity of dysphagia;time post onset  Individuals consulted  Consulted and agree with results and recommendations: Patient;RN    Education  Patient Education: Educated pt to role of SLP, dysphagia, risks of aspiration, results of session, and recommendations. Educated pt to PO vs NPO options. Patient Education Response: No evidence of learning  Safety Devices in place: Yes  Type of devices: All fall risk precautions in place       Therapy Time  SLP Individual Minutes  Time In: 1100  Time Out: 1115  Minutes: 15  SLP Co-Treatment Minutes  Time In: 0000  Time Out: 0000  Minutes: 0  SLP Total Treatment Time  Timed Code Treatment Minutes: 0 Minutes  Total Treatment Time: DENNIS [facility]-SLP; [id number]  Speech-Language Pathologist  Pager # [phone]  Phone # [phone]  Office # [phone]    If patient is discharged prior to next session, this note will serve as discharge summary.

## 2021-02-28 NOTE — PLAN OF CARE
Problem: Falls - Risk of:  Goal: Will remain free from falls  Description: Will remain free from falls  Outcome: Ongoing   Patient meets [facility] fall risk guidelines. Non skid footwear with ambulation. Bed in lowest position. Call light in reach. Bed alarm activated. Reminded to call for assistance before exiting bed. Continue safety precautions. Problem: Skin Integrity:  Goal: Will show no infection signs and symptoms  Description: Will show no infection signs and symptoms  Outcome: Ongoing   Bilateral LE edema, SANIA swelling noted. Buttocks reddened. Barrier cream applied. Extremities elevated off bed. Turn and reposition every two hours and as needed for comfort.

## 2021-02-28 NOTE — PROGRESS NOTES
Clinical Pharmacy Consult Note    Admit date: 2/27/2021    Subjective/Objective:  81 yo male with PMHx that includes Afib (Eliquis), HFpEF, HLD, CAD, BPH, and GERD who presented with SOB, hypoxia, and productive cough - admitted with severe sepsis 2/2 suspected PNA. Pharmacy is consulted to dose Vancomycin per Dr. Elroy Garcia    Pertinent Medications:  Vancomycin -- Pharmacy to dose -- day # 2   1st dose: 1.25g IV x1 (2/27 @ 1159)  Meropenem 1g IV q12h -- day # 2    Recent Labs     02/27/21  1112 02/28/21  0600    142   K 4.0 3.8   CL 98* 105   CO2 27 26   BUN 25* 23*   CREATININE 1.1 0.9       Estimated Creatinine Clearance: 43 mL/min (based on SCr of 0.9 mg/dL). Recent Labs     02/27/21  1112 02/28/21  0600   WBC 29.5* 16.2*   HGB 15.9 14.9   HCT 48.6 45.3   MCV 95.6 96.3   PLT see below 182       Height:  5' 9\" (175.3 cm)  Weight: 130 lb 4.7 oz (59.1 kg)    Micro:  Date Site Micro Susceptibility   2/27 Blood x2 No growth to date    2/27 MRSA nasal Pending    2/27 SARS-CoV-2 Pending    2/27 Rapid flu Negative    2/27 Urine Pending        Assessment/Plan:  1. Severe sepsis 2/2 suspected PNA:  Vancomycin and meropenem - day # 2  · Meropenem -   Current dose remains appropriate based on indication and current renal function. Will continue to monitor and adjust as needed per [facility] Renal Dose Adjustment Policy. · Vancomycin - Pharmacy to dose  · Continue 1g IV q24h. · Clinical condition will be monitored closely, and levels will be ordered as clinically indicated.     Please call with questions--  Thanks--  Raz Sewell, PharmD, BCPS, BCGP  [id number] ([facility])   2/28/2021 1:20 PM

## 2021-02-28 NOTE — PROGRESS NOTES
Internal Medicine PGY-1 Resident Progress note        PCP: Mira Stinson MD    Date of Admission: 2/27/2021    Date of Service: Pt seen/examined on 2/27/21 and Admitted to Inpatient with expected LOS greaterthan two midnights due to medical therapy. Chief Complaint:  SOB, productive cough    Interval Hx:  NAEON. NG tube was not able to be placed hence pt started on heparim gtt instead of his eliquis. Since starting antbiotics afeb, and not tachpenic. Other vital stable. SpO2 at 98 on 3 L . WBC 16. < 29.5. No chest pain, SOB, confusion light headedness. Creat 0.9 < 1.1. LE edema 2 plus. BC pedning, urine cilt pending, MRSA pending, resp cult  pending  Day 2 vanc/merem  Overall he says he is not doing to well and he has a lot of secretions. He does seem alert and responsive      History Of Present Illness:      80 y.o. male with PMH A. fib on Eliquis, HFPEF, hyperlipidemia, CAD BPH, and GERD who presented to [facility] from [address] with shortness of breath, hypoxia and productive cough. Patient was recently discharged from the [facility] on 2/25 where he was diagnosed with age-related physical debility. He was subsequently sent to the nursing facility for further rehab. Patient is not verbal and encephalopathic on presentation. He was not able to provide any history so the nursing facility was called. They report that this morning around breakfast time he was choking. He had a deep productive cough and was gasping. His blood pressure was stable but he was satting 85% on room air and down to 78 to 79% while coughing. Nurse noted decreased airflow on the left and crackles on the right. She states that prior to this patient was responding to commands but was not really verbal.  Apparently yesterday he was answering questions appropriately and was alert and oriented x2. In the ED he was febrile to 100.6 °F, tachypneic to 25, satting 99% on 3 L nasal cannula.   Labs significant for leukocytosis of 29.5, creatinine of 1.1 (baseline 0.7), lactic acid of 3.7 (later 2.8 after fluids), BNP 2865, troponin 0 0.05, UA with negative nitrite, small leukocyte esterase, 6-9 white blood cells and 4+ bacteria. EKG with previously demonstrated incomplete right bundle branch block but new onset rapid ventricular response for A. Fib. CXR negative for acute cardiopulmonary disease. CT chest with airspace in the lower lobes in the left upper lobe, mild mediastinal adenopathy likely reactive. CT head with multiple remote infarcts including basal ganglia lacunar infarct but no acute intracranial abnormality. Patient will be admitted for further work-up of severe sepsis secondary pneumonia and acute toxic metabolic encephalopathy. Past Medical History:          Diagnosis Date    Atrial fibrillation ([address] 05/2016    Carotid artery disease (HCC)     R>L    GERD (gastroesophageal reflux disease) 2/21/2013    Headaches due to old head injury 2010    Heart murmur, systolic 6/5/6392    7/5/31 Echo at [facility] [place]: Normal left ventricle size and systolic function with an estimated ejection fraction of 55%. Concentric left ventricular hypertrophy is present. No regional wall motion abnormalities are seen. There is reversal of E/A inflow velocities across the mitral valve suggesting impaired left ventricular relaxation. Aortic valve appears sclerotic but opens adequately. Moderate a    Hyperlipidemia LDL goal < 100 2/21/2013    Hypertension     Moderate aortic insufficiency 1/9/2014 1/9/14 Echo at [facility] [place]: Normal left ventricle size and systolic function with an estimated ejection fraction of 55%. Concentric left ventricular hypertrophy is present. No regional wall motion abnormalities are seen. There is reversal of E/A inflow velocities across the mitral valve suggesting impaired left ventricular relaxation. Aortic valve appears sclerotic but opens adequately.  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 Weakness substantially exacerbated after left anterior frontal lobe intraparenchymal bleed 5/4/16 [facility] admission.  SBO (small bowel obstruction) ([address] 08/2018    resolved conservative tx     Past Surgical History:          Procedure Laterality Date    CAPSULOTOMY Bilateral 11/2011    [facility] - Dr. Joshua Zepeda, YAG capsulotomies for secondary cataracts    CATARACT REMOVAL WITH IMPLANT Left 10/6/2006    with vitrectomy & membrane peel [facility] - Dr Stephon Schuler and Samantha Bishop    COLONOSCOPY  1/11/05    diverticulosis, no polyps Dr. Amie Cheng. No re-screening needed per Dr. Amie Cheng.  CORONARY ANGIOPLASTY WITH STENT PLACEMENT  11/2016    PDA     HERNIA REPAIR      SKIN BIOPSY Right 11/2012    Right ear keloid - Dr. Ximena Cervantes Right 10/2010    Squamous cell - right ear - Dr. Shereen Melara N/A 1/6/2020    EGD DIAGNOSTIC ONLY performed by Tasha Gilman MD at [address] [place] [address] Right 6/6/2007    with membrane peel - eye [facility] - Dr Stephon Schuler       Medications Prior to Admission:      Prior to Admission medications    Medication Sig Start Date End Date Taking? Authorizing Provider   traMADol (ULTRAM) 50 MG tablet Take 50 mg by mouth every 6 hours as needed for Pain.     Historical Provider, MD   furosemide (LASIX) 40 MG tablet TAKE 1 & 1/2 (ONE & ONE-HALF) TABLETS BY MOUTH ONCE DAILY IN THE MORNING 2/19/21   Beatriz Gibson MD   tamsulosin (FLOMAX) 0.4 MG capsule TAKE 1 CAPSULE BY MOUTH ONCE DAILY IN THE EVENING FOR PROSTATE AND URINATION 2/19/21   Beatriz Gibson MD   apixaban Mirian Meo) 2.5 MG TABS tablet Take 1 tablet by mouth 2 times daily 9/23/20   Beatriz Gibson MD   finasteride (PROSCAR) 5 MG tablet Take 1 tablet by mouth daily 9/23/20   Nancy Pepe MD   acetaminophen (TYLENOL) 325 MG tablet Take 2 tablets by mouth every 6 hours as needed for Pain 1/9/20   Johnny Prieto MD   potassium chloride (KLOR-CON M) 10 MEQ extended release tablet Take 2 tablets by mouth daily 10/14/19   Nancy Pepe MD   atorvastatin (LIPITOR) 80 MG tablet TAKE 1 TABLET BY MOUTH ONE TIME A DAY  7/30/19   Nancy Pepe MD   vitamin B-12 (CYANOCOBALAMIN) 1000 MCG tablet Take OTC B12 or similar B-complex vitamins 4ax=8058 mcg a day for neurological health and as a natural energy booster (read a bottle of 5 Hour Energy or diet Red Bull). High B12 levels are proven to help prevent dementia & neuropathy. Patient taking differently: Take 1,000 mcg by mouth daily Take OTC B12 or similar B-complex vitamins 4cx=6526 mcg a day for neurological health and as a natural energy booster (read a bottle of 5 Hour Energy or diet Red Bull). High B12 levels are proven to help prevent dementia & neuropathy. 5/20/16   Samia Green MD   omeprazole (PRILOSEC OTC) 20 MG tablet 1 daily to protect stomach. 5/20/16   Samia Green MD   polyethylene glycol [facility]) powder Take as directed every other day for constipation 9/23/13   Samia Green MD       Allergies:  Aspirin and Nuts [peanut-containing drug products]    Social History:      The patient currently at [facility]:   reports that he has never smoked. He has never used smokeless tobacco.  ETOH:  reports previous alcohol use. Family History:     History reviewed. No pertinent family history. PHYSICAL EXAM PERFORMED:    /68   Pulse 82   Temp 97.7 °F (36.5 °C) (Oral)   Resp 16   Ht 5' 9\" (1.753 m)   Wt 130 lb 4.7 oz (59.1 kg)   SpO2 97%   BMI 19.24 kg/m²     General appearance: Patient responding to questions and  participating in physical exam.  Increased secretions noted as well as difficulty clearing them. In mild distress.   HEENT:  Normal cephalic,atraumatic without obvious deformity. Pupils equal, round, and reactive to light. Extra ocular muscles intact. Conjunctivae/corneas clear. Neck: Supple, with full range of motion. No jugular venous distention. Trachea midline. Respiratory: Increased secretions. Diffuse bilateral rhonchi. Cardiovascular:  Regular rate and rhythm with normal S1/S2 without murmurs, rubs or gallops. Abdomen: Soft, non-tender, non-distended with normal bowel sounds. Musculoskeletal:  No clubbing, cyanosis bilaterally. 2+ pitting edema in bilateral lower extremities. Skin: Skin color, texture, turgor normal.  Norashes or lesions. Neurologic: Not responding to commands, not answering simple questions. Psychiatric: Not responding to commands, not answering questions. Capillary Refill: Brisk,< 3 seconds   Peripheral Pulses: +2 palpable, equal bilaterally       Labs:     Recent Labs     02/27/21  1112 02/28/21  0600   WBC 29.5* 16.2*   HGB 15.9 14.9   HCT 48.6 45.3   PLT see below 182     Recent Labs     02/27/21  1112 02/28/21  0600    142   K 4.0 3.8   CL 98* 105   CO2 27 26   BUN 25* 23*   CREATININE 1.1 0.9   CALCIUM 9.8 9.3     Recent Labs     02/27/21  1112   AST 28   ALT 16   BILITOT 1.9*   ALKPHOS 221*     Recent Labs     02/27/21  2231   INR 1.89*     Recent Labs     02/27/21  1112 02/27/21  1310 02/27/21  1953   TROPONINI 0.05* <0.01 0.03*       Urinalysis:      Lab Results   Component Value Date    NITRU Negative 02/27/2021    WBCUA 6-9 02/27/2021    BACTERIA 4+ 02/27/2021    RBCUA >100 02/27/2021    BLOODU LARGE 02/27/2021    SPECGRAV 1.015 02/27/2021    GLUCOSEU Negative 02/27/2021       Radiology:     CT HEAD WO CONTRAST   Final Result      1. No findings for acute intracranial abnormality. 2.  Age-related atrophy with extensive periventricular deep white matter changes bilaterally consistent with chronic small vessel ischemia. 3.  Multiple remote infarcts as described above.       4.  Remote bilateral basal ganglia lacunar infarcts. IMPRESSION:      CT CHEST ABDOMEN PELVIS W CONTRAST   Final Result      CHEST:      1. Airspace disease in the lower lobes as well as the left upper lobe. This could reflect new infection within the lung parenchyma. The areas of airspace disease in the right upper lobe and right middle lobe seen on prior examination has resolved. 2. Mild mediastinal adenopathy, likely reactive. 3. Cardiomegaly and pulmonary arterial enlargement, stable. ABDOMEN/PELVIS:      1. Moderate degree of stool in the rectum. 2. Advanced sclerosis. 3. Otherwise no acute abnormality. XR CHEST PORTABLE   Final Result   No acute disease in the chest.                ASSESSMENT & PLAN:  Jeaneth Rubio is a 80 y.o. male w/ PMH A. fib on Eliquis, HFPEF, hyperlipidemia, CAD BPH, and GERD who presented with shortness of breath, hypoxia and productive cough. Active Hospital Problems    Diagnosis Date Noted    Sepsis [facility]) [A41.9] 02/27/2021     Severe sepsis likely 2/2 pneumonia  SIRS criteria positive for fever, tachycardia, tachypneic and leukocytosis. Patient was started on sepsis fluid protocol. Lactic acid 3.7 on admission and now 2.8. Clear CXR, but CT chest with airspace disease in lower lobes and left upper lobe with mild mediastinal adenopathy. Patient started on Vancomycin and cefepime in ED.  -Rule out Covid, droplet plus  -Legionella and strep pneumo antigens, influenza  -Continue vancomycin/Merrem    -Follow-up blood cultures /resp cult/covid  - MRSA nasal swab  - palliative care consulted    Likely aspiration pneumonia  -Management as above    Acute hypoxic respiratory failure 2/2 sepsis/(aspiration) pneumonia  -Pt tachypniec, increased wob, with low SpO2 on admission  Patient satting to 85% on room air at nursing facility prior to admission, with saturations as low as 78-79% while coughing.   Currently satting 98% on 3 L O2 nasal cannula.  -Wean O2 as appropriate  -continue antibiotics    MILADY  Baseline creatinine 0.7 on 2/21. Creatinine 1.1 on admission. This am 0.9 with fluids  -Continue IVF  -Monitor renal function  -Avoid nephrotoxic medications and contrast if possible       UTI  UA with negative nitrite, small leukocyte esterase, 6-9 WBC and 4+ bacteria  -Follow-up urine cultures  -Antibiotics as above    NSTEMI type II  -no chest pain , no ST elevation or LBBB  Troponin 0 0.05 admission, on repeat 0.01     Bilateral lower extremity pitting edema  2+ pitting edema bilateral lower extremities. Likely secondary to venous insufficiency. Last echo on 2/2021 revealed EF greater than 65%, no regional wall motion abnormalities, and estimated pulmonary systolic pressure of [id number]  -No chest pain, dyspena  -SpO2 at 98 percent on 3 L  -Strict I's and O's  -Daily weights    Atrial fibrillation - continue home Eliquis  Hyperlipidemia - continue home Lipitor  BPH - continue home finasteride, Flomax    DVT Prophylaxis: Apixaban  Diet: Diet NPO Effective Now, pending SLP eval  Code Status: Full Code    PT/OT Eval Status: Ordered    Dispo -GMF    I will discuss the patient with the senior resident and Dr. Julia Martínez.       Jake Benitez MD  Internal Medicine Resident, PGY-1

## 2021-02-28 NOTE — PLAN OF CARE
SLP evaluation completed. Please refer to EMR.     Yuridia Carvalho MA CCC-SLP; [phone]  Speech-Language Pathologist

## 2021-03-01 VITALS
WEIGHT: 132.5 LBS | RESPIRATION RATE: 21 BRPM | OXYGEN SATURATION: 95 % | TEMPERATURE: 97 F | DIASTOLIC BLOOD PRESSURE: 90 MMHG | HEART RATE: 87 BPM | SYSTOLIC BLOOD PRESSURE: 122 MMHG | BODY MASS INDEX: 19.62 KG/M2 | HEIGHT: 69 IN

## 2021-03-01 LAB
ALBUMIN SERPL-MCNC: 2.4 G/DL (ref 3.4–5)
ANION GAP SERPL CALCULATED.3IONS-SCNC: 8 MMOL/L (ref 3–16)
BASOPHILS ABSOLUTE: 0 K/UL (ref 0–0.2)
BASOPHILS RELATIVE PERCENT: 0.2 %
BUN BLDV-MCNC: 19 MG/DL (ref 7–20)
CALCIUM SERPL-MCNC: 8.9 MG/DL (ref 8.3–10.6)
CHLORIDE BLD-SCNC: 107 MMOL/L (ref 99–110)
CO2: 28 MMOL/L (ref 21–32)
CREAT SERPL-MCNC: 0.6 MG/DL (ref 0.8–1.3)
EOSINOPHILS ABSOLUTE: 0.1 K/UL (ref 0–0.6)
EOSINOPHILS RELATIVE PERCENT: 0.7 %
GFR AFRICAN AMERICAN: >60
GFR NON-AFRICAN AMERICAN: >60
GLUCOSE BLD-MCNC: 91 MG/DL (ref 70–99)
HCT VFR BLD CALC: 39.3 % (ref 40.5–52.5)
HEMOGLOBIN: 13.1 G/DL (ref 13.5–17.5)
LACTIC ACID: 1.1 MMOL/L (ref 0.4–2)
LYMPHOCYTES ABSOLUTE: 1.1 K/UL (ref 1–5.1)
LYMPHOCYTES RELATIVE PERCENT: 7.2 %
MAGNESIUM: 1.7 MG/DL (ref 1.8–2.4)
MCH RBC QN AUTO: 31.7 PG (ref 26–34)
MCHC RBC AUTO-ENTMCNC: 33.4 G/DL (ref 31–36)
MCV RBC AUTO: 95.1 FL (ref 80–100)
MONOCYTES ABSOLUTE: 1 K/UL (ref 0–1.3)
MONOCYTES RELATIVE PERCENT: 6.6 %
MRSA SCREEN RT-PCR: ABNORMAL
NEUTROPHILS ABSOLUTE: 12.6 K/UL (ref 1.7–7.7)
NEUTROPHILS RELATIVE PERCENT: 85.3 %
ORGANISM: ABNORMAL
PDW BLD-RTO: 14.8 % (ref 12.4–15.4)
PHOSPHORUS: 2.3 MG/DL (ref 2.5–4.9)
PLATELET # BLD: 169 K/UL (ref 135–450)
PMV BLD AUTO: 8.1 FL (ref 5–10.5)
POTASSIUM SERPL-SCNC: 3.4 MMOL/L (ref 3.5–5.1)
RBC # BLD: 4.14 M/UL (ref 4.2–5.9)
SODIUM BLD-SCNC: 143 MMOL/L (ref 136–145)
WBC # BLD: 14.8 K/UL (ref 4–11)

## 2021-03-01 PROCEDURE — 99239 HOSP IP/OBS DSCHRG MGMT >30: CPT | Performed by: HOSPITALIST

## 2021-03-01 PROCEDURE — 83605 ASSAY OF LACTIC ACID: CPT

## 2021-03-01 PROCEDURE — 99221 1ST HOSP IP/OBS SF/LOW 40: CPT | Performed by: NURSE PRACTITIONER

## 2021-03-01 PROCEDURE — 83735 ASSAY OF MAGNESIUM: CPT

## 2021-03-01 PROCEDURE — 6360000002 HC RX W HCPCS: Performed by: STUDENT IN AN ORGANIZED HEALTH CARE EDUCATION/TRAINING PROGRAM

## 2021-03-01 PROCEDURE — 97166 OT EVAL MOD COMPLEX 45 MIN: CPT

## 2021-03-01 PROCEDURE — 85025 COMPLETE CBC W/AUTO DIFF WBC: CPT

## 2021-03-01 PROCEDURE — 97530 THERAPEUTIC ACTIVITIES: CPT

## 2021-03-01 PROCEDURE — 6360000002 HC RX W HCPCS

## 2021-03-01 PROCEDURE — 36415 COLL VENOUS BLD VENIPUNCTURE: CPT

## 2021-03-01 PROCEDURE — 97161 PT EVAL LOW COMPLEX 20 MIN: CPT

## 2021-03-01 PROCEDURE — 80069 RENAL FUNCTION PANEL: CPT

## 2021-03-01 PROCEDURE — 97535 SELF CARE MNGMENT TRAINING: CPT

## 2021-03-01 PROCEDURE — 2580000003 HC RX 258: Performed by: STUDENT IN AN ORGANIZED HEALTH CARE EDUCATION/TRAINING PROGRAM

## 2021-03-01 RX ORDER — MAGNESIUM SULFATE IN WATER 40 MG/ML
2000 INJECTION, SOLUTION INTRAVENOUS ONCE
Status: COMPLETED | OUTPATIENT
Start: 2021-03-01 | End: 2021-03-01

## 2021-03-01 RX ORDER — POTASSIUM CHLORIDE 7.45 MG/ML
10 INJECTION INTRAVENOUS
Status: DISPENSED | OUTPATIENT
Start: 2021-03-01 | End: 2021-03-01

## 2021-03-01 RX ADMIN — ENOXAPARIN SODIUM 60 MG: 60 INJECTION SUBCUTANEOUS at 00:09

## 2021-03-01 RX ADMIN — MEROPENEM 1000 MG: 1 INJECTION, POWDER, FOR SOLUTION INTRAVENOUS at 05:18

## 2021-03-01 RX ADMIN — POTASSIUM CHLORIDE 10 MEQ: 10 INJECTION, SOLUTION INTRAVENOUS at 10:05

## 2021-03-01 RX ADMIN — ENOXAPARIN SODIUM 60 MG: 60 INJECTION SUBCUTANEOUS at 12:03

## 2021-03-01 RX ADMIN — MEROPENEM 1000 MG: 1 INJECTION, POWDER, FOR SOLUTION INTRAVENOUS at 15:04

## 2021-03-01 RX ADMIN — MAGNESIUM SULFATE HEPTAHYDRATE 2000 MG: 40 INJECTION, SOLUTION INTRAVENOUS at 10:16

## 2021-03-01 RX ADMIN — Medication 10 ML: at 10:06

## 2021-03-01 RX ADMIN — VANCOMYCIN HYDROCHLORIDE 1000 MG: 10 INJECTION, POWDER, LYOPHILIZED, FOR SOLUTION INTRAVENOUS at 12:22

## 2021-03-01 ASSESSMENT — PAIN SCALES - WONG BAKER
WONGBAKER_NUMERICALRESPONSE: 0
WONGBAKER_NUMERICALRESPONSE: 0

## 2021-03-01 ASSESSMENT — PAIN SCALES - GENERAL: PAINLEVEL_OUTOF10: 0

## 2021-03-01 NOTE — CARE COORDINATION
CM discussed case with PC NP Jeffrey Good, after speaking with POA/Nephew Deepa Ingram, hospice has been requested. No preference of agency and would like for patient to return home with hospice care and their current private duty care. CM has made referral to [facility] for new hospice referral. Spoke with Becki Cardoso and referral provided and reviewed. Becki Cardoso will be reaching out to Deepa Ingram for further discussion of hospice services and needs. Becki Cardoso will follow up with CM once she has spoken with family and reviewed clinicals and updated of current situation. Patient could possibly transition from hospital to home with hospice care today if all arrangements completed between hospice and family. CM will continue to follow for further discharge planning and updates with hospice and family. 1:04 PM  VIRGILIO received voicemail from Michele Franklin ([phone]) RN with [facility]. She reports that they have emailed consents to patients nephew Deepa Ingram, he is signing them and will be heading back to [place] [address] to be at the home for equipment delivery and to be there when patient gets home. VIRGILIO has updated resident Dr Rodríguez  for request for signing consent form and DNR, also if there are any comfort meds that patient will need at [facility] requested that paper scripts be on the chart or sent meds to beds. Michele Franklin has arranged for ambulance transport to home via [facility] ([phone]) for 5:30-6pm tonight. Charge ETHAN Mitchell and patients ETHAN Garcia updated and aware of current plan. Michele Franklin reports either herself or another [facility] liaison will be here this afternoon to make sure all paperwork is completed and all arrangements have been completed for patient discharge today.     Thank you,  Segundo Viera RN,   4th Floor Progressive Care Unit  [phone],

## 2021-03-01 NOTE — PROGRESS NOTES
Internal Medicine PGY-1 Resident Progress note        PCP: Koko Boone MD    Date of Admission: 2/27/2021    Date of Service: Pt seen/examined on 2/27/21 and Admitted to Inpatient with expected LOS greaterthan two midnights due to medical therapy. Chief Complaint:  SOB, productive cough    Interval Hx:  NAEON. Since starting antbiotics afeb, and not tachpenic. Other vital stable. SpO2 at 98 on 3 L . WBC 14 <16. < 29.5. No chest pain, SOB, confusion light headedness. Creat 0.6 < 0.9 < 1.1. LE edema 2 plus. BC pedning, urine cult pending, MRSA positive , resp cult  pending  Day 3 vanc/merem  Overall he says he is  doing to well but he has a lot of secretions. He does seem alert and responsive. Palliative spoke to [facility]. It was decided to proceed with hospice care and start a diet to bring comfort to the patient. History Of Present Illness:      80 y.o. male with PMH A. fib on Eliquis, HFPEF, hyperlipidemia, CAD BPH, and GERD who presented to [facility]. from [facility] with shortness of breath, hypoxia and productive cough. Patient was recently discharged from the [facility]. on 2/25 where he was diagnosed with age-related physical debility. He was subsequently sent to the nursing facility for further rehab. Patient is not verbal and encephalopathic on presentation. He was not able to provide any history so the nursing facility was called. They report that this morning around breakfast time he was choking. He had a deep productive cough and was gasping. His blood pressure was stable but he was satting 85% on room air and down to 78 to 79% while coughing. Nurse noted decreased airflow on the left and crackles on the right. She states that prior to this patient was responding to commands but was not really verbal.  Apparently yesterday he was answering questions appropriately and was alert and oriented x2.     In the ED he was febrile to 100.6 °F, tachypneic to 25, satting 99% on 3 L nasal cannula. Labs significant for leukocytosis of 29.5, creatinine of 1.1 (baseline 0.7), lactic acid of 3.7 (later 2.8 after fluids), BNP 2865, troponin 0 0.05, UA with negative nitrite, small leukocyte esterase, 6-9 white blood cells and 4+ bacteria. EKG with previously demonstrated incomplete right bundle branch block but new onset rapid ventricular response for A. Fib. CXR negative for acute cardiopulmonary disease. CT chest with airspace in the lower lobes in the left upper lobe, mild mediastinal adenopathy likely reactive. CT head with multiple remote infarcts including basal ganglia lacunar infarct but no acute intracranial abnormality. Patient will be admitted for further work-up of severe sepsis secondary pneumonia and acute toxic metabolic encephalopathy. Past Medical History:          Diagnosis Date    Atrial fibrillation ([address] 05/2016    Carotid artery disease (HCC)     R>L    GERD (gastroesophageal reflux disease) 2/21/2013    Headaches due to old head injury 2010    Heart murmur, systolic 3/5/6355    8/4/43 Echo at [facility] [place]: Normal left ventricle size and systolic function with an estimated ejection fraction of 55%. Concentric left ventricular hypertrophy is present. No regional wall motion abnormalities are seen. There is reversal of E/A inflow velocities across the mitral valve suggesting impaired left ventricular relaxation. Aortic valve appears sclerotic but opens adequately. Moderate a    Hyperlipidemia LDL goal < 100 2/21/2013    Hypertension     Moderate aortic insufficiency 1/9/2014 1/9/14 Echo at [facility]: Normal left ventricle size and systolic function with an estimated ejection fraction of 55%. Concentric left ventricular hypertrophy is present. No regional wall motion abnormalities are seen. There is reversal of E/A inflow velocities across the mitral valve suggesting impaired left ventricular relaxation.  Aortic valve appears sclerotic but opens adequately. 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 Weakness substantially exacerbated after left anterior frontal lobe intraparenchymal bleed 5/4/16 [facility] admission.  SBO (small bowel obstruction) ([address] 08/2018    resolved conservative tx     Past Surgical History:          Procedure Laterality Date    CAPSULOTOMY Bilateral 11/2011    [facility] - Dr. Elba Ruiz, YAG capsulotomies for secondary cataracts    CATARACT REMOVAL WITH IMPLANT Left 10/6/2006    with vitrectomy & membrane peel [facility] - Dr Conor Yoder and [facility]    COLONOSCOPY  1/11/05    diverticulosis, no polyps Dr. Desiree Abernathy. No re-screening needed per Dr. Desiree Abernathy.  CORONARY ANGIOPLASTY WITH STENT PLACEMENT  11/2016    PDA     HERNIA REPAIR      SKIN BIOPSY Right 11/2012    Right ear keloid - Dr. Mirella Dhillon Right 10/2010    Squamous cell - right ear - Dr. Miguelina Magana N/A 1/6/2020    EGD DIAGNOSTIC ONLY performed by Abner Mejia MD at [address] [place] [address] Right 6/6/2007    with membrane peel - eye [facility] - Dr Conor Yoder       Medications Prior to Admission:      Prior to Admission medications    Medication Sig Start Date End Date Taking? Authorizing Provider   traMADol (ULTRAM) 50 MG tablet Take 50 mg by mouth every 6 hours as needed for Pain.     Historical Provider, MD   furosemide (LASIX) 40 MG tablet TAKE 1 & 1/2 (ONE & ONE-HALF) TABLETS BY MOUTH ONCE DAILY IN THE MORNING 2/19/21   Anaya Manning MD   tamsulosin (FLOMAX) 0.4 MG capsule TAKE 1 CAPSULE BY MOUTH ONCE DAILY IN THE EVENING FOR PROSTATE AND URINATION 2/19/21   Anaya Manning MD   apixaban [facility]) 2.5 MG TABS tablet Take 1 tablet by mouth 2 times daily 9/23/20   Anaya Manning MD   finasteride (PROSCAR) 5 MG tablet Take 1 tablet by mouth daily 9/23/20   Bryson Del Real MD   acetaminophen (TYLENOL) 325 MG tablet Take 2 tablets by mouth every 6 hours as needed for Pain 1/9/20   Gerardo Landers MD   potassium chloride (KLOR-CON M) 10 MEQ extended release tablet Take 2 tablets by mouth daily 10/14/19   Bryson Del Real MD   atorvastatin (LIPITOR) 80 MG tablet TAKE 1 TABLET BY MOUTH ONE TIME A DAY  7/30/19   Bryson Del Real MD   vitamin B-12 (CYANOCOBALAMIN) 1000 MCG tablet Take OTC B12 or similar B-complex vitamins 3xj=7874 mcg a day for neurological health and as a natural energy booster (read a bottle of 5 Hour Energy or diet Red Bull). High B12 levels are proven to help prevent dementia & neuropathy. Patient taking differently: Take 1,000 mcg by mouth daily Take OTC B12 or similar B-complex vitamins 1ok=0947 mcg a day for neurological health and as a natural energy booster (read a bottle of 5 Hour Energy or diet Red Bull). High B12 levels are proven to help prevent dementia & neuropathy. 5/20/16   Rikki Tavarez MD   omeprazole (PRILOSEC OTC) 20 MG tablet 1 daily to protect stomach. 5/20/16   Rikki Tavarez MD   polyethylene glycol [facility]) powder Take as directed every other day for constipation 9/23/13   Rikki Tavarez MD       Allergies:  Aspirin and Nuts [peanut-containing drug products]    Social History:      The patient currently at [facility]:   reports that he has never smoked. He has never used smokeless tobacco.  ETOH:  reports previous alcohol use. Family History:     History reviewed. No pertinent family history. PHYSICAL EXAM PERFORMED:    /74   Pulse 87   Temp 97.7 °F (36.5 °C) (Oral)   Resp 16   Ht 5' 9\" (1.753 m)   Wt 132 lb 7.9 oz (60.1 kg)   SpO2 96%   BMI 19.57 kg/m²     General appearance: Patient responding to questions and  participating in physical exam.  Increased secretions noted as well as difficulty clearing them. In mild distress.   HEENT: Normal cephalic,atraumatic without obvious deformity. Pupils equal, round, and reactive to light. Extra ocular muscles intact. Conjunctivae/corneas clear. Neck: Supple, with full range of motion. No jugular venous distention. Trachea midline. Respiratory: Increased secretions. Diffuse bilateral rhonchi. Cardiovascular:  Regular rate and rhythm with normal S1/S2 without murmurs, rubs or gallops. Abdomen: Soft, non-tender, non-distended with normal bowel sounds. Musculoskeletal:  No clubbing, cyanosis bilaterally. 2+ pitting edema in bilateral lower extremities. Skin: Skin color, texture, turgor normal.  Norashes or lesions. Neurologic: Not responding to commands, not answering simple questions. Psychiatric: Not responding to commands, not answering questions. Capillary Refill: Brisk,< 3 seconds   Peripheral Pulses: +2 palpable, equal bilaterally       Labs:     Recent Labs     02/27/21  1112 02/28/21  0600 03/01/21  0518   WBC 29.5* 16.2* 14.8*   HGB 15.9 14.9 13.1*   HCT 48.6 45.3 39.3*   PLT see below 182 169     Recent Labs     02/27/21  1112 02/28/21  0600 03/01/21  0518    142 143   K 4.0 3.8 3.4*   CL 98* 105 107   CO2 27 26 28   BUN 25* 23* 19   CREATININE 1.1 0.9 0.6*   CALCIUM 9.8 9.3 8.9   PHOS  --   --  2.3*     Recent Labs     02/27/21  1112   AST 28   ALT 16   BILITOT 1.9*   ALKPHOS 221*     Recent Labs     02/27/21  2231   INR 1.89*     Recent Labs     02/27/21  1112 02/27/21  1310 02/27/21  1953   TROPONINI 0.05* <0.01 0.03*       Urinalysis:      Lab Results   Component Value Date    NITRU Negative 02/27/2021    WBCUA 6-9 02/27/2021    BACTERIA 4+ 02/27/2021    RBCUA >100 02/27/2021    BLOODU LARGE 02/27/2021    SPECGRAV 1.015 02/27/2021    GLUCOSEU Negative 02/27/2021       Radiology:     CT HEAD WO CONTRAST   Final Result      1. No findings for acute intracranial abnormality.       2.  Age-related atrophy with extensive periventricular deep white matter changes bilaterally consistent with chronic small vessel ischemia. 3.  Multiple remote infarcts as described above. 4.  Remote bilateral basal ganglia lacunar infarcts. IMPRESSION:      CT CHEST ABDOMEN PELVIS W CONTRAST   Final Result      CHEST:      1. Airspace disease in the lower lobes as well as the left upper lobe. This could reflect new infection within the lung parenchyma. The areas of airspace disease in the right upper lobe and right middle lobe seen on prior examination has resolved. 2. Mild mediastinal adenopathy, likely reactive. 3. Cardiomegaly and pulmonary arterial enlargement, stable. ABDOMEN/PELVIS:      1. Moderate degree of stool in the rectum. 2. Advanced sclerosis. 3. Otherwise no acute abnormality. XR CHEST PORTABLE   Final Result   No acute disease in the chest.                ASSESSMENT & PLAN:  Conchita Ceron is a 80 y.o. male w/ PMH A. fib on Eliquis, HFPEF, hyperlipidemia, CAD BPH, and GERD who presented with shortness of breath, hypoxia and productive cough. Active Hospital Problems    Diagnosis Date Noted    Acute cystitis without hematuria [N30.00]     MILADY (acute kidney injury) ([address] [N17.9]     Sepsis ([address] [A41.9] 02/27/2021    Multifocal pneumonia [J18.9]     Volume depletion [E86.9]      Severe sepsis likely 2/2 pneumonia  SIRS criteria positive for fever, tachycardia, tachypneic and leukocytosis. Patient was started on sepsis fluid protocol. Lactic acid 3.7 on admission and now 2.8. Clear CXR, but CT chest with airspace disease in lower lobes and left upper lobe with mild mediastinal adenopathy.   Patient started on Vancomycin and cefepime in ED.  -Rule out Covid, droplet plus  -Legionella and strep pneumo antigens, influenza  -Continue vancomycin/Merrem    -Follow-up blood cultures /resp cult/covid  - MRSA nasal swab pos  - palliative care consulted    Likely aspiration pneumonia  -Management as above    Acute hypoxic respiratory failure 2/2

## 2021-03-01 NOTE — DISCHARGE SUMMARY
INTERNAL MEDICINE DEPARTMENT AT [address] [place] [address]  DISCHARGE SUMMARY    Patient ID: Lily Noguera                                             Discharge Date: 3/1/2021   Patient's PCP: Kim Noguera MD                                          Discharge Physician: Alva Gaston MD  Admit Date: 2/27/2021   Admitting Physician: Alva Gaston MD    PROBLEMS DURING HOSPITALIZATION:  Hospital Problems           Last Modified POA    Volume depletion 2/28/2021 Yes    Multifocal pneumonia 2/28/2021 Yes    Sepsis ([facility] 2/27/2021 Yes    Acute cystitis without hematuria 2/28/2021 Yes    MILADY (acute kidney injury) ([facility] 2/28/2021 Yes        DISCHARGE DIAGNOSES:  Severe sepsis likely 2/2 pneumonia  Acute hypoxic respiratory failure 2/2 sepsis/(aspiration) pneumonia  MILADY  UTI  NSTEMI type II  Bilateral lower extremity pitting edema   Atrial fibrillation  Hyperlipidemia   [facility] Course:   80-year-old male with a past medical history of A. fib on Eliquis, heart failure with preserved ejection fraction, coronary artery disease, hyperlipidemia who presented to the [facility] from his nursing facility with shortness of breath, hypoxia and a productive cough patient. [facility] was recently discharged from the [facility] hospital on 2/25 when he was diagnosed with age-related physical debility. [facility] was subsequently sent to the nursing facility for further rehab.  After admission patient was found to have acute hypoxic respiratory failure and severe sepsis secondary to [address] [facility].  His MRSA swab was positive. [facility] was started on fluids and antibiotics which included vancomycin and meropenem and his oxygen saturations improved from 79% on presentation to more than 95% on 3 L.  His tachypnea and increased work of breathing also resolved.  Due to his several comorbid conditions a palliative consult was taken after which it was decided that hospice care would be most appropriate for the patient.  After acceptance from hospice the patient was discharged home in a stable condition with [facility] and he was instructed to follow-up with his PCP in 1 week. Physical Exam:  BP (!) 122/90   Pulse 92   Temp 97 °F (36.1 °C) (Oral)   Resp 21   Ht 5' 9\" (1.753 m)   Wt 132 lb 7.9 oz (60.1 kg)   SpO2 95%   BMI 19.57 kg/m²   General appearance: Patient responding to questions and  participating in physical exam.  Increased secretions noted as well as difficulty clearing them. In mild distress. HEENT:  Normal cephalic,atraumatic without obvious deformity. Pupils equal, round, and reactive to light. Extra ocular muscles intact. Conjunctivae/corneas clear. Neck: Supple, with full range of motion. No jugular venous distention. Trachea midline. Respiratory: Increased secretions. Diffuse bilateral rhonchi. Cardiovascular:  Regular rate and rhythm with normal S1/S2 without murmurs, rubs or gallops. Abdomen: Soft, non-tender, non-distended with normal bowel sounds. Musculoskeletal:  No clubbing, cyanosis bilaterally. 2+ pitting edema in bilateral lower extremities. Skin: Skin color, texture, turgor normal.  Norashes or lesions. Neurologic: Not responding to commands, not answering simple questions. Psychiatric: Not responding to commands, not answering questions. Capillary Refill: Brisk,< 3 seconds   Peripheral Pulses: +2 palpable, equal bilaterally     Consults: none  Significant Diagnostic Studies:   CT HEAD WO CONTRAST   Final Result      1. No findings for acute intracranial abnormality. 2.  Age-related atrophy with extensive periventricular deep white matter changes bilaterally consistent with chronic small vessel ischemia. 3.  Multiple remote infarcts as described above. 4.  Remote bilateral basal ganglia lacunar infarcts. IMPRESSION:      CT CHEST ABDOMEN PELVIS W CONTRAST   Final Result      CHEST:      1. Airspace disease in the lower lobes as well as the left upper lobe.  This could reflect new infection within the lung parenchyma. The areas of airspace disease in the right upper lobe and right middle lobe seen on prior examination has resolved. 2. Mild mediastinal adenopathy, likely reactive. 3. Cardiomegaly and pulmonary arterial enlargement, stable. ABDOMEN/PELVIS:      1. Moderate degree of stool in the rectum. 2. Advanced sclerosis. 3. Otherwise no acute abnormality. XR CHEST PORTABLE   Final Result   No acute disease in the chest.              Disposition: home with [address] [place] [address] hospice  Discharged Condition: Stable  Follow Up: Primary Care Physician in one week    DISCHARGE MEDICATION:     Medication List      CHANGE how you take these medications    vitamin B-12 1000 MCG tablet  Commonly known as: CYANOCOBALAMIN  Take OTC B12 or similar B-complex vitamins 0qb=5543 mcg a day for neurological health and as a natural energy booster (read a bottle of 5 Hour Energy or diet Red Bull). High B12 levels are proven to help prevent dementia & neuropathy.   What changed:   · how much to take  · how to take this  · when to take this        CONTINUE taking these medications    acetaminophen 325 MG tablet  Commonly known as: TYLENOL  Take 2 tablets by mouth every 6 hours as needed for Pain     apixaban 2.5 MG Tabs tablet  Commonly known as: Eliquis  Take 1 tablet by mouth 2 times daily     atorvastatin 80 MG tablet  Commonly known as: LIPITOR  TAKE 1 TABLET BY MOUTH ONE TIME A DAY     finasteride 5 MG tablet  Commonly known as: PROSCAR  Take 1 tablet by mouth daily     furosemide 40 MG tablet  Commonly known as: LASIX  TAKE 1 & 1/2 (ONE & ONE-HALF) TABLETS BY MOUTH ONCE DAILY IN THE MORNING     omeprazole 20 MG tablet  Commonly known as: PriLOSEC OTC  1 daily to protect stomach.     polyethylene glycol 17 GM/SCOOP powder  Commonly known as: GLYCOLAX  Take as directed every other day for constipation     potassium chloride 10 MEQ extended release tablet  Commonly known as: KLOR-CON M  Take 2 tablets by mouth daily     tamsulosin 0.4 MG capsule  Commonly known as: FLOMAX  TAKE 1 CAPSULE BY MOUTH ONCE DAILY IN THE EVENING FOR PROSTATE AND URINATION     traMADol 50 MG tablet  Commonly known as: ULTRAM          Activity: activity as tolerated  Diet: regular diet  Wound Care: none needed    Time Spent on discharge is more than 30 minutes    Signed:  Rodrick Muhammad MD   3/1/2021    Addendum to Resident H& P/Progress note:  I have personally seen,examined and evaluated the patient.  I have reviewed the current history, physical findings, labs and assessment and plan and agree with note as documented by resident MD ( Solomon Lopez)      Rodriguez Salmeron MD, Basim Jacob

## 2021-03-01 NOTE — PROGRESS NOTES
Clinical Pharmacy Consult Note    Admit date: 2/27/2021    Subjective/Objective:  79 yo male with PMHx that includes Afib (Eliquis), HFpEF, HLD, CAD, BPH, and GERD who presented with SOB, hypoxia, and productive cough - admitted with severe sepsis 2/2 suspected PNA. Pharmacy is consulted to dose Vancomycin per Dr. Munira Varma    Interval update: Heparin changed to therapeutic Lovenox. Afebrile yesterday. Urine output not documented, but SCr improved. Pertinent Medications:  Vancomycin -- Pharmacy to dose -- day #3   1st dose: 1.25g IV x1 (2/27 @ 1159)   1 g IV q24h (2/28 - current)  Meropenem -- day #3   1g IV q12h (2/27 - 2/28)   1 g IV q8h (3/1 - current)    Recent Labs     02/28/21  0600 03/01/21  0518    143   K 3.8 3.4*    107   CO2 26 28   PHOS  --  2.3*   BUN 23* 19   CREATININE 0.9 0.6*     Estimated Creatinine Clearance: 65 mL/min (A) (based on SCr of 0.6 mg/dL (L)). Recent Labs     02/28/21  0600 03/01/21  0518   WBC 16.2* 14.8*   HGB 14.9 13.1*   HCT 45.3 39.3*   MCV 96.3 95.1    169     Height:  5' 9\" (175.3 cm)  Weight: 132 lb 7.9 oz (60.1 kg)    Micro:  Date Site Micro Susceptibility   2/27 Blood x2 No growth to date    2/27 MRSA nasal Positive    2/27 SARS-CoV-2 Pending    2/27 Rapid flu Negative    2/27 Urine <50,000 mixed suzan      Assessment/Plan:  1. Severe sepsis 2/2 suspected PNA:  Vancomycin and meropenem - day #3  · Meropenem -   Will increase to 1 g IV q8h based on indication and current renal function. Will continue to monitor and adjust as needed per [facility] Renal Dose Adjustment Policy. · Vancomycin - Pharmacy to dose  · MRSA swab positive. · SCr improved to baseline. Urine output not documented. CrCl likely overestimated due to age. Continue 1g IV q24h for now. Will order a trough before tomorrow's dose and adjust if necessary. · Clinical condition will be monitored closely, and levels will be ordered as clinically indicated.     Please call with questions--  Thanks--  Ella Schuster, PharmD  PGY1 Pharmacy Resident   Wireless: [phone]  3/1/2021 9:52 AM

## 2021-03-01 NOTE — ACP (ADVANCE CARE PLANNING)
Advance Care Planning     Advance Care Planning (ACP) Physician/NP/PA (Provider) Mohinder Anthony      Date of ACP Conversation: 2/27/2021    Conversation Conducted with:    Healthcare Decision Maker: Named in Advance Directive or Healthcare Power of  (name) [address]:    Current Designated Health Care Decision Maker:    Primary Decision Maker (Active): Liban Frederick - Niece/Nephew - [phone]      Care Preferences:    Hospitalization: \"If your health worsens and it becomes clear that your chance of recovery is unlikely, what would your preference be regarding hospitalization? \"  If the patient would want hospitalization, answer \"yes\". If the patient would prefer comfort-focused treatment without hospitalization, answer \"no\". YES/NO/WILD CARDS: no      Ventilation: \"If you were in your present state of health and suddenly became very ill and were unable to breathe on your own, what would your preference be about the use of a ventilator (breathing machine) if it was available to you? \"    If patient would desire the use of a ventilator (breathing machine), answer \"yes\", if not answer \"no\":no      Resuscitation:  \"CPR works best to restart the heart when there is a sudden event, like a heart attack, in someone who is otherwise healthy. Unfortunately, CPR does not typically restart the heart for people who have serious health conditions or who are very sick. \"    \"In the event your heart stopped as a result of an underlying serious health condition, would you want attempts to be made to restart your heart (answer \"yes\" for attempt to resuscitate) or would you prefer a natural death (answer \"no\" for do not attempt to resuscitate)? \"   no       Conversation Outcomes / Follow-Up Plan:   Entered DNR order (If yes, complete Durable DNR form)  HCPOA requests referral to hospice    Length of Voluntary ACP Conversation in minutes:  20 minutes    Memory Farrier

## 2021-03-01 NOTE — CONSULTS
[facility]  Palliative Medicine Consultation Note      Date Of Admission:2/27/2021  Date of consult: 03/01/21  Seen by [facility] in the past:  Yes    Recommendations:        Met with the pt at the bedside, re-introduced palliative care. He is not able to recall much about the past several days. He knows he was brought back to the hospital for a \"coughing episode\". He knows that he has PNA. We discussed his issues with swallowing, explained aspiration in layman's terms although it is not clear he understood. We spoke about eating by mouth for quality of life vs possible feeding tube. He had difficulty rendering an opinion on this, states \"I need to eat something, I need some water\". Of note, he has a HCPOA on his hard chart and his primary agent is his nephew Bartolome Lora. He asks this NP to speak with his nephew Benshayla Lora about this. D/w ETHAN Garcia at the bedside, pt is awaiting SLP evaluation. Called to speak with pt's HCPOA/Nephew Bartolome Lora, introduced palliative care. He reports that the pt has been bedbound about 85% of the time. They have a CNA that comes to assist the pt with walking once a day at home, otherwise the pt's wife gets him cleaned up in bed. Discussed his understanding of the pt's medical condition, explained concerns for aspiration and eating for QOL vs possible feeding tube. Bartolome Lora reports that he discussed this a bit with the pt and his wife over the weekend, and that the pt would want to eat for quality of life. He really wants to be at home with his wife. We discussed changing code status to DNR/DNI, Bartolome Lora states that the pt would want to be DNR/DNI and has previously stated this. Addi's main concern was about getting the pt DME at home, explained that hospice would assist with this. Bartolome Lora requests hospice referral, d/w Akanksha Cochran, Dr. Jimmie Henry and bedside RN Radha. 1. Goals of Care/Advanced Care planning/Code status: Limited - DNR/DNI, discussed today.  Goals are comfort directed, HCPOA requests to allow pt to eat for QOL and requests hospice referral.   2. Pain: Pt denied  3. SOB: Pt denied, currently on 3LNC and wet vocal quality. 4. Sepsis likely 2/2 aspiration PNA: SIRS criteria positive for fever, tachycardia, tachypneic and leukocytosis. Started on vanc and cefepime. Discussed aspiration with HCPOA today as above. 5. Disposition: Likely home with hospice, wife and private duty caregiver. Reason for Consult:         [x]  Goals of Care  [x]  Code Status Discussion/Advanced Care Planning   [x]  Psychosocial/Family Support  []  Symptom Management  []  Other (Specify)    Requesting Physician: Dr. Florez Doris:  SOB, productive cough    History Obtained From:  patient, electronic medical record    History of Present Illness:         Jazmín Brian is a 80 y.o. male with PMH of afib on eliquis, HFpEF, HLD, CAD, BPH and GERD who presented with SOB, hypoxia and productive cough. He was recently discharged from [facility] on 2/25 when he was diagnosed with age-related physical debility. He was sent to SNF for further rehab. He is nonverbal and encephalopathic on presentation. Reportedly he had been eating breakfast at the facility when he aspirated, had a deep productive cough and was gasping. Per staff at the facility he was responding to commands but not verbal.     In the ED he was febrile to 100.6 °F, tachypneic to 25, satting 99% on 3 L nasal cannula. Labs significant for leukocytosis of 29.5, creatinine of 1.1 (baseline 0.7), lactic acid of 3.7 (later 2.8 after fluids), BNP 2865, troponin 0 0.05, UA with negative nitrite, small leukocyte esterase, 6-9 white blood cells and 4+ bacteria. EKG with previously demonstrated incomplete right bundle branch block but new onset rapid ventricular response for A. Fib. CXR negative for acute cardiopulmonary disease. CT chest with airspace in the lower lobes in the left upper lobe, mild mediastinal adenopathy likely reactive.   CT head with multiple remote infarcts including basal ganglia lacunar infarct but no acute intracranial abnormality. He was admitted for work up of severe sepsis secondary to PNA. Subjective:         Past Medical History:        Diagnosis Date    Atrial fibrillation ([facility] [address] 05/2016    Carotid artery disease (HCC)     R>L    GERD (gastroesophageal reflux disease) 2/21/2013    Headaches due to old head injury 2010    Heart murmur, systolic 2/2/3143    3/4/35 Echo at [facility] [place]: Normal left ventricle size and systolic function with an estimated ejection fraction of 55%. Concentric left ventricular hypertrophy is present. No regional wall motion abnormalities are seen. There is reversal of E/A inflow velocities across the mitral valve suggesting impaired left ventricular relaxation. Aortic valve appears sclerotic but opens adequately. Moderate a    Hyperlipidemia LDL goal < 100 2/21/2013    Hypertension     Moderate aortic insufficiency 1/9/2014 1/9/14 Echo at [facility] [place]: Normal left ventricle size and systolic function with an estimated ejection fraction of 55%. Concentric left ventricular hypertrophy is present. No regional wall motion abnormalities are seen. There is reversal of E/A inflow velocities across the mitral valve suggesting impaired left ventricular relaxation. Aortic valve appears sclerotic but opens adequately. 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 Weakness substantially exacerbated after left anterior frontal lobe intraparenchymal bleed 5/4/16 [facility] admission.     SBO (small bowel obstruction) ([facility] [address] 08/2018    resolved conservative tx       Past Surgical History:        Procedure Laterality Date    CAPSULOTOMY Bilateral 11/2011    [facility] - JAQUELIN Hassan capsulotomies for [employer]). High B12 levels are proven to help prevent dementia & neuropathy.)  omeprazole (PRILOSEC OTC) 20 MG tablet, 1 daily to protect stomach.  polyethylene glycol (GLYCOLAX) powder, Take as directed every other day for constipation    Allergies:  Aspirin and Nuts [peanut-containing drug products]    Social History:    · TOBACCO: reports that he has never smoked. He has never used smokeless tobacco.  · ETOH:   reports previous alcohol use. · Patient currently lives with family wife, came from SNF after recent admission. Review of Systems -   Review of Systems: A 10 point review of systems was conducted, significant findings as notedin HPI. ROS limited as the pt is poor historian    Objective:          Physical Exam  Constitutional:       Appearance: He is ill-appearing. Cardiovascular:      Heart sounds: Normal heart sounds. Pulmonary:      Comments: Appears with increased WOB, wet vocal quality  Abdominal:      General: Bowel sounds are normal.      Palpations: Abdomen is soft. Musculoskeletal:      Right lower leg: No edema. Left lower leg: No edema. Skin:     General: Skin is warm and dry. Neurological:      Mental Status: He is alert. Comments: Oriented to self and place, not oriented to situation          Palliative Performance Scale:  [] 60% Ambulation reduced; Significant disease; Can't do hobbies/housework; intake normal or reduced; occasional assist; LOC full/confusion  [] 50% Mainly sit/lie; Extensive disease; Can't do any work; Considerable assist; intake normal  Or reduced; LOC full/confusion  [x] 40% Mainly in bed; Extensive disease; Mainly assist; intake normal or reduced; occasional assist; LOC full/confusion  [] 30% Bed Bound; Extensive disease; Total care; intake reduced; LOC full/confusion  [] 20% Bed Bound; Extensive disease; Total care; intake minimal; Drowsy/coma  [] 10% Bed Bound; Extensive disease;  Total care; Mouth care only; Drowsy/coma  [] 0% Death    PPS: 40    Vitals:    /71   Pulse 80   Temp 97.9 °F (36.6 °C)   Resp 16   Ht 5' 9\" (1.753 m)   Wt 132 lb 7.9 oz (60.1 kg)   SpO2 93%   BMI 19.57 kg/m²     Labs:    BMP:   Recent Labs     02/27/21  1112 02/28/21  0600 03/01/21  0518    142 143   K 4.0 3.8 3.4*   CL 98* 105 107   CO2 27 26 28   BUN 25* 23* 19   CREATININE 1.1 0.9 0.6*   GLUCOSE 102* 98 91     CBC:   Recent Labs     02/27/21  1112 02/28/21  0600 03/01/21  0518   WBC 29.5* 16.2* 14.8*   HGB 15.9 14.9 13.1*   HCT 48.6 45.3 39.3*   PLT see below 182 169       LFT's:   Recent Labs     02/27/21  1112   AST 28   ALT 16   BILITOT 1.9*   ALKPHOS 221*     Troponin:   Recent Labs     02/27/21  1112 02/27/21  1310 02/27/21  1953   TROPONINI 0.05* <0.01 0.03*     BNP: No results for input(s): BNP in the last 72 hours. ABGs:   Recent Labs     02/27/21  1850   PHART 7.465*   KTM6TNS 38.3   PO2ART 110.0*     INR:   Recent Labs     02/27/21  2231   INR 1.89*       U/A:  Recent Labs     02/27/21  1325   COLORU Yellow   PHUR 6.0   WBCUA 6-9*   RBCUA >100*   BACTERIA 4+*   CLARITYU Clear   SPECGRAV 1.015   LEUKOCYTESUR SMALL*   UROBILINOGEN 1.0   BILIRUBINUR Negative   BLOODU LARGE*   GLUCOSEU Negative       CT HEAD WO CONTRAST   Final Result      1. No findings for acute intracranial abnormality. 2.  Age-related atrophy with extensive periventricular deep white matter changes bilaterally consistent with chronic small vessel ischemia. 3.  Multiple remote infarcts as described above. 4.  Remote bilateral basal ganglia lacunar infarcts. IMPRESSION:      CT CHEST ABDOMEN PELVIS W CONTRAST   Final Result      CHEST:      1. Airspace disease in the lower lobes as well as the left upper lobe. This could reflect new infection within the lung parenchyma. The areas of airspace disease in the right upper lobe and right middle lobe seen on prior examination has resolved. 2. Mild mediastinal adenopathy, likely reactive.    3. Cardiomegaly and pulmonary arterial enlargement, stable. ABDOMEN/PELVIS:      1. Moderate degree of stool in the rectum. 2. Advanced sclerosis. 3. Otherwise no acute abnormality. XR CHEST PORTABLE   Final Result   No acute disease in the chest.                  Conclusion/Time spent:         Recommendations see above    Time spent with patient and/or family: 20  Time reviewing records: 10 min   Time communicating with staff: 5 min     A total of 35 minutes spent with the patient and family on unit greater than 50% in counseling regarding palliative care and in goals of care for the patient. Thank you to Dr. Julia Martínez for this consultation. We will continue to follow Mr. Kohli's care as needed.     Ghassan Agustin [facility]  Inpatient Palliative Care  [phone]

## 2021-03-01 NOTE — PROGRESS NOTES
Occupational Therapy   Occupational Therapy Initial Assessment/Treatment  Date: 3/1/2021   Patient Name: Miguel Roberts  MRN: [id number]     : 1927    Date of Service: 3/1/2021    Discharge Recommendations:  Miguel Roberts scored a 1224 on the AM-PAC ADL Inpatient form. Current research shows that an AM-PAC score of 17 or less is typically not associated with a discharge to the patient's home setting. Based on the patient's AM-PAC score and their current ADL deficits, it is recommended that the patient have 3-5 sessions per week of Occupational Therapy at d/c to increase the patient's independence. Please see assessment section for further patient specific details. If patient discharges prior to next session this note will serve as a discharge summary. Please see below for the latest assessment towards goals. OT Equipment Recommendations  Equipment Needed: No    Assessment   Performance deficits / Impairments: Decreased functional mobility ; Decreased ADL status; Decreased cognition;Decreased endurance;Decreased balance  Assessment: Pt presents with a decline in functional independence. Pt is to return home with hospice care and their current private duty care. Treatment Diagnosis: Impaired ADL and functional mobility  Prognosis: Fair  Decision Making: Medium Complexity  OT Education: OT Role;Plan of Care  Patient Education: Needs reinforcement  REQUIRES OT FOLLOW UP: Yes  Activity Tolerance  Activity Tolerance: Patient Tolerated treatment well  Safety Devices  Safety Devices in place: Yes  Type of devices: Left in chair;Call light within reach; Chair alarm in place;Nurse notified         Treatment Diagnosis: Impaired ADL and functional mobility      Restrictions  Position Activity Restriction  Other position/activity restrictions: Ambulate patient    Subjective   General  Chart Reviewed: Yes  Additional Pertinent Hx: Pt admitted 21 with shortness of breath and hypoxia on room air to the 80s. Head CT=  No findings for acute intracranial abnormality; Age-related atrophy with extensive periventricular deep white matter changes bilaterally consistent with chronic small vessel ischemia; Multiple remote infarcts as described above;  Remote bilateral basal ganglia lacunar infarcts. PMH includes: HTN, A-Fib, multi infarcts, PUD, SBO, hernia repair, caronary stent, skin cancer excision  Family / Caregiver Present: No  Referring Practitioner: Dr. Swetha Estrada  Diagnosis: Sepsis  Subjective  Subjective: Pt in bed upon entry. Did you here me? Patient Currently in Pain: Denies  Pain Assessment  Pain Assessment: 0-10  Pain Level: 0  Vital Signs  Temp: 97 °F (36.1 °C)  Temp Source: Oral  Pulse: 92  Heart Rate Source: Monitor  Resp: 21  BP: (!) 122/90  BP Location: Right upper arm  MAP (mmHg): 101  Patient Position: Semi fowlers  Level of Consciousness: Alert (0)  MEWS Score: 2  Patient Currently in Pain: Denies  Oxygen Therapy  SpO2: 95 %  Pulse Oximeter Device Mode: Continuous  Pulse Oximeter Device Location: Other(comment)(ear)  O2 Device: None (Room air)  Social/Functional History  Social/Functional History  Type of Home: Facility(Has been at [facility] due to generalized weakness.)       Objective   Vision: Within Functional Limits  Hearing: Exceptions to [facility]  Hearing Exceptions: Hard of hearing/hearing concerns    Orientation  Overall Orientation Status: Impaired  Orientation Level: Oriented to person(Hospital)     Balance  Sitting Balance: Stand by assistance(sitting edge of bed)  Standing Balance: Dependent/Total(max assist x2 for partial  Cinthia Persons)  ADL  Feeding: Supervision; Beverage management  Grooming: Setup; Moderate assistance  LE Dressing: Dependent/Total  Toileting: (denied need)  Tone RUE  RUE Tone: Normotonic  Tone LUE  LUE Tone: Normotonic  Coordination  Movements Are Fluid And Coordinated: Yes     Bed mobility  Supine to Sit: Minimal assistance(HOB elevated)  Transfers  Sit to stand: 2 Person assistance(max assist)  Stand to sit: 2 Person assistance(mod assist x2)  Transfer Comments: Transferred bed to chair via [place]  Overall Cognitive Status: Exceptions  Arousal/Alertness: Delayed responses to stimuli  Following Commands: Follows one step commands consistently  Attention Span: Attends with cues to redirect  Memory: Decreased recall of biographical Information;Decreased recall of recent events  Safety Judgement: Decreased awareness of need for assistance  Problem Solving: Assistance required to generate solutions  Insights: Decreased awareness of deficits                 LUE AROM (degrees)  LUE AROM : WFL  RUE AROM (degrees)  RUE AROM : WFL        Hand Dominance  Hand Dominance: Right     Treatment included ADL and transfer training.         Plan   Plan  Times per week: 2-5  Current Treatment Recommendations: Strengthening, ROM, Balance Training, Endurance Training, Functional Mobility Training, Self-Care / ADL    AM-PAC Score        AM-[facility] Inpatient Daily Activity Raw Score: 12 (03/01/21 1243)  AM-PAC Inpatient ADL T-Scale Score : 30.6 (03/01/21 1243)  ADL Inpatient CMS 0-100% Score: 66.57 (03/01/21 1243)  ADL Inpatient CMS G-Code Modifier : CL (03/01/21 1243)    Goals                              No goals met  Short term goals  Time Frame for Short term goals: Discharge  Short term goal 1: Stance with min assist 1 min x2 to assist with ADL  Short term goal 2: transfer to/from [facility] wit mod assist  Patient Goals   Patient goals : Not stated       Therapy Time   Individual Concurrent Group Co-treatment   Time In 0139         Time Out 1225         Minutes 33         Timed Code Treatment Minutes: [address] [facility], OTR/L [id number]

## 2021-03-01 NOTE — PLAN OF CARE
Problem: Falls - Risk of:  Goal: Will remain free from falls  Description: Will remain free from falls  Outcome: Ongoing   Patient meets Darlen Abisai fall risk guidelines. Non skid footwear with ambulation. Bed in lowest position. Call light in reach. Bed alarm activated. Reminded to call for assistance before exiting bed. Continue safety precautions. Problem: Skin Integrity:  Goal: Will show no infection signs and symptoms  Description: Will show no infection signs and symptoms  Outcome: Ongoing   Patient reddened buttocks and scrotum. BLE edema. Bruising scattered. Sacral heart to coccyx. Barrier cream applied. Legs elevated off bed.  Continue to turn and reposition every two hours and as needed for inc.

## 2021-03-01 NOTE — DISCHARGE INSTR - COC
Continuity of Care Form    Patient Name: Manfred Pina   :  1927  MRN:  [id number]     Admit date:  2021  Discharge date:  3/1/2021

## 2021-03-01 NOTE — PROGRESS NOTES
Pt discharged to home with hospice with transportation provided by EMS. IV and tele monitor removed. All belongings left with patient.  Electronically signed by Marilee Graham RN on 3/1/2021 at 6:49 PM

## 2021-03-01 NOTE — PROGRESS NOTES
Physical Therapy    Facility/Department: [place] [address] [facility]  Initial Assessment and Treatment    NAME: Rafita Quezada  : 1927  MRN: [id number]    Date of Service: 3/1/2021    Discharge Recommendations:    Rafita Quezada scored a  on the AM-PAC short mobility form. Current research shows that an AM-PAC score of 17 or less is typically not associated with a discharge to the patient's home setting. Based on the patient's AM-PAC score and their current functional mobility deficits, it is recommended that the patient have 3-5 sessions per week of Physical Therapy at d/c to increase the patient's independence. Please see assessment section for further patient specific details. If patient discharges prior to next session this note will serve as a discharge summary. Please see below for the latest assessment towards goals. PT Equipment Recommendations  Equipment Needed: No    Assessment   Assessment: Pt from [facility], Plan is for return home with hospice care at this time. Recommend continued therapies while in the hospital    Treatment Diagnosis: Decreased functional mobility post admission for sepsis       Patient Diagnosis(es): The encounter diagnosis was Pneumonia due to organism. has a past medical history of Atrial fibrillation ([address] Carotid artery disease ([address] GERD (gastroesophageal reflux disease), Headaches due to old head injury, Heart murmur, systolic, Hyperlipidemia LDL goal < 100, Hypertension, Moderate aortic insufficiency, Multi-infarct state, PUD (peptic ulcer disease), Right leg weakness, and SBO (small bowel obstruction) ([address] has a past surgical history that includes hernia repair; capsulotomy (Bilateral, 2011); skin biopsy (Right, 2012); Skin cancer excision (Right, 10/2010); vitrectomy (Right, 2007); Cataract removal with implant (Left, 10/6/2006); Colonoscopy (05);  Coronary angioplasty with stent (2016); and Upper gastrointestinal endoscopy (N/A, 1/6/2020). Restrictions  Position Activity Restriction  Other position/activity restrictions: strict bedrest  Vision/Hearing  Vision: Within Functional Limits  Hearing: Exceptions to [facility]  Hearing Exceptions: Hard of hearing/hearing concerns     Subjective  General  Chart Reviewed: Yes  Additional Pertinent Hx: HLD, HTN, Afib, CAD, GERD, HA, systolic neart murmur,  multi-infarct state cerebrum and cerebellum,  PUD, RLE weakness,  SBO. Recent DC from [facility] 2/25 to [facility]. Referring Practitioner: Fabricio Patterson  Diagnosis: Adm 2/27 with SOB, hypoxia. Dx  Sepsis 2/2 PNA, metabolic encephalopathy  Subjective  Subjective: Pt supine in bed upon PT entry, agreeable to working with PT  Pain Screening  Patient Currently in Pain: Denies  Vital Signs  Patient Currently in Pain: Yes(notes Right hand pain from IV,  RN notified)       Orientation  Orientation  Overall Orientation Status: Impaired(Pt aware he is in a hospital.  Month - February)  Orientation Level: Oriented to time;Oriented to place;Oriented to person  Social/Functional History  Social/Functional History  Type of Home: Facility(Has been at [facility] due to generalized weakness.)  Cognition   Cognition  Overall Cognitive Status: Exceptions  Arousal/Alertness: Delayed responses to stimuli  Following Commands: Follows one step commands consistently  Attention Span: Attends with cues to redirect  Memory: Decreased recall of biographical Information;Decreased recall of recent events  Safety Judgement: Decreased awareness of need for assistance  Problem Solving: Assistance required to generate solutions  Insights: Decreased awareness of deficits    Objective      ROM - limited ROM of LE's in supine and sitting. Feet swollen , red and cold,  RN notified, has been monitoring. Strength - unable to specifically assess.         Bed mobility  Supine to Sit: Minimal assistance(HOB elevated)  Transfers  Sit to Stand: Maximum Assistance;2 Person Assistance(from raised eob to [place]edy, and from [place] to stand)  Stand to sit: Moderate Assistance;2 Person Assistance(mod x 2 from stedy to chair)  Bed to Chair: Dependent/Total([place])  Ambulation  Ambulation?: No     Balance  Comments: Good static sitting on eob. Only able to stand partially upright in stedy. Needed max x 2 to stand partially to get paddles under pt. Plan   Plan  Times per week: 2-5  Times per day: Daily  Current Treatment Recommendations: Functional Mobility Training, Safety Education & Training, Transfer Training  Safety Devices  Type of devices: Call light within reach, Chair alarm in place, Nurse notified, Left in chair             AM-PAC Score  AM-PAC Inpatient Mobility Raw Score : 11 (03/01/21 1226)  AM-PAC Inpatient T-Scale Score : 33.86 (03/01/21 1226)  Mobility Inpatient CMS 0-100% Score: 72.57 (03/01/21 1226)  Mobility Inpatient CMS G-Code Modifier : CL (03/01/21 1226)          Goals  Short term goals  Time Frame for Short term goals: Discharge  Short term goal 1: Bed mobility with CG  Short term goal 2: Transfers with max x 1  Patient Goals   Patient goals :  To return home       Therapy Time   Individual Concurrent Group Co-treatment   Time In 1150         Time Out 1225         Minutes 35              Timed Code Treatment Minutes:   23    Total Treatment Minutes:  [address] [facility], [id number]

## 2021-03-01 NOTE — PLAN OF CARE
Problem: Falls - Risk of:  Goal: Will remain free from falls  Description: Will remain free from falls  3/1/2021 1307 by Rochelle Davis RN  Note: Pt is a Fall Risk. See Maisie Bence Fall Risk Score. Pt bed in low position and side rails up. Call light and belongings in reach. Pt encouraged to call for assistance. Will continue with hourly rounds for PO intake, pain needs, toileting, and repositioning as needed. Problem: Skin Integrity:  Goal: Will show no infection signs and symptoms  Description: Will show no infection signs and symptoms  3/1/2021 1307 by Rochelle Davis RN  Note: No s/sx of infection noted this shift. Q2 turns remain in place.  Will cont to monitor

## 2021-03-01 NOTE — PROGRESS NOTES
M.D. paged to be made aware of Critical Anti Xa level . Lab unable to run aPTT testing. See new orders.

## 2021-03-02 ENCOUNTER — TELEPHONE (OUTPATIENT)
Dept: INTERNAL MEDICINE CLINIC | Age: 86
End: 2021-03-02

## 2021-03-02 NOTE — TELEPHONE ENCOUNTER
Sujata with [facility] called wanting to clarify patient's medications. They admitted him to hospice on 03/01/21. Please call to advise.

## 2021-03-03 LAB
BLOOD CULTURE, ROUTINE: NORMAL
CULTURE, BLOOD 2: NORMAL

## 2021-03-03 NOTE — TELEPHONE ENCOUNTER
Spoke to [employer] gave advise. She is going to talk to pt wife about the meds that are not covered to see if she wants him to continue them and if so she will have the wife get them.

## 2021-09-14 NOTE — PROGRESS NOTES
Occupational Therapy   Occupational Therapy Initial Assessment and Treatment Note   Date: 2020   Patient Name: Melo Gaston  MRN: [id number]     : 1927    Date of Service: 2020    Discharge Recommendations:Adolfo Kohli scored a 15/24 on the AM-PAC ADL Inpatient form. Current research shows that an AM-PAC score of 17 or less is typically not associated with a discharge to the patient's home setting. Based on the patients AM-PAC score and their current ADL deficits, it is recommended that the patient have 3-5 sessions per week of Occupational Therapy at d/c to increase the patients independence. OT Equipment Recommendations  Equipment Needed: No  Other: defer to next care facility     Assessment   Performance deficits / Impairments: Decreased functional mobility ; Decreased ADL status; Decreased endurance;Decreased safe awareness  Assessment: Pt from home -needing assist with LE ADLs/ but ambulates with RW. Pt needing Max/Dep assist with LE ADLs and Max /dep assist with functional transfers/ mobility. Pt would benefit from ongoing inpt OT at d/c. Pt's wife is elderly and frail herself. Will follow as inpt. Treatment Diagnosis: impaired ADLs / functional transfers / mobility 2/2 generalized  weakness  Prognosis: Fair  Decision Making: Medium Complexity  OT Education: OT Role;Plan of Care  Patient Education: verb partial understanding - reinforce as needed   REQUIRES OT FOLLOW UP: Yes  Activity Tolerance  Activity Tolerance: Patient Tolerated treatment well;Patient limited by fatigue  Activity Tolerance: Pt reports fatigue at end of session   Safety Devices  Safety Devices in place: Yes  Type of devices: Left in bed;Call light within reach;Nurse notified; Bed alarm in place           Patient Diagnosis(es): The primary encounter diagnosis was Hematemesis, presence of nausea not specified.  A diagnosis of Nausea and vomiting, intractability of vomiting not specified, unspecified vomiting type was ST, MET, myofascial release and OMT undertaken with good results.  Cont HEP     also pertinent to this visit. has a past medical history of Atrial fibrillation ([address] Carotid artery disease ([address] GERD (gastroesophageal reflux disease), Headaches due to old head injury, Heart murmur, systolic, Hyperlipidemia LDL goal < 100, Hypertension, Moderate aortic insufficiency, Multi-infarct state, PUD (peptic ulcer disease), Right leg weakness, and SBO (small bowel obstruction) ([address] has a past surgical history that includes hernia repair; capsulotomy (Bilateral, 11/2011); skin biopsy (Right, 11/2012); Skin cancer excision (Right, 10/2010); vitrectomy (Right, 6/6/2007); Cataract removal with implant (Left, 10/6/2006); Colonoscopy (1/11/05); Coronary angioplasty with stent (11/2016); and Upper gastrointestinal endoscopy (N/A, 1/6/2020). Treatment Diagnosis: impaired ADLs / functional transfers / mobility 2/2 gernalized weakness      Restrictions  Position Activity Restriction  Other position/activity restrictions: Up with assist    Subjective   General  Chart Reviewed: Yes  Additional Pertinent Hx: Admit 1/6 with coffee ground emesis  CXR (-) acute. Abd/pelvic CT: 1. Findings favor a multifocal pneumonia with could reflect aspiration 2. Mildly distended proximal small bowel with gradual transition distally suggesting ileus. 1/6 EGD. PMHx:  Afib, CAD, GERD, HTN, aortic insufficiency, PUD, RLE weakness, SBO  Family / Caregiver Present: No  Referring Practitioner: Freedom Zhu MD  Diagnosis: Coffee Ground Emesis  Subjective  Subjective: \" I 'm getting tired \"  Pt in chair and report tailbone is getting sore.  Pt agreeable for OT eval and tx   Patient Currently in Pain: Denies  Vital Signs  Patient Currently in Pain: Denies    Social/Functional History  Social/Functional History  Lives With: Spouse  Type of Home: House  Home Layout: One level  Home Access: Ramped entrance  Bathroom Shower/Tub: (Pt sponge bathes at sink)  Bathroom Toilet: Standard(3-in-1 over toilet)  Bathroom Equipment: Grab bars around toilet, 3-in-1 commode, Grab bars in shower  Home Equipment: Rolling walker, Lift chair(transport chair)  ADL Assistance: Needs assistance(spouse bathes pt with bath wipes while pt stands at the sink, assist for dressing, indep with toileting)  Homemaking Assistance: Needs assistance(spouse does homemaking)  Ambulation Assistance: Independent(uses rolling walker inside house for short distance, takes transport chair outside of home)  Transfer Assistance: Independent  Active : No(wife drives)  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 Pt reports only leaves the house about once a month for a doctor's appointment. Objective  Treatment included functional transfer training, ADL's and pt. education. Orientation  Overall Orientation Status: Impaired  Orientation Level: Oriented to person;Oriented to place; Disoriented to time;Disoriented to situation     Balance  Sitting Balance: Contact guard assistance(at EOB / chair )  Standing Balance: Minimal assistance(with walker and v.cues for hand placement )  Standing Balance  Time: 4 mins x 1 and 1 min s x 2   Activity: functional transfers /stance in roxana steady   Functional Mobility  Functional Mobility Comments: unable to assess 2/2 weakness this date. Toilet Transfers  Toilet - Technique: Stand pivot  Equipment Used: Standard bedside commode  Toilet Transfer: Dependent/Total;Maximum assistance  Toilet Transfers Comments: simulated -- Pt needing Pérez Cord steady for chair to bed transfer this date   ADL  Feeding: Setup; Increased time to complete  Grooming: Setup; Increased time to complete  LE Dressing: Maximum assistance;Dependent/Total  Toileting: Dependent/Total(incont of stool and another episode in stance with loss of control )- pt stood with walker x 4 mins and Min A while RN performed pericare and applied adult brief. Bed mobility  Sit to Supine:  Moderate assistance;Maximum assistance(with BLEs )  Scooting: Minimal assistance  Transfers  Sit to stand: Maximum assistance  Stand to sit: Moderate assistance  Transfer Comments: Mod A from chair - Max A from bed   Vision - Basic Assessment  Prior Vision: No visual deficits  Cognition  Overall Cognitive Status: Exceptions  Arousal/Alertness: Appropriate responses to stimuli  Following Commands: Follows one step commands consistently  Memory: Decreased short term memory  Safety Judgement: Decreased awareness of need for safety;Decreased awareness of need for assistance  Insights: Decreased awareness of deficits  Initiation: Requires cues for some  Sequencing: Requires cues for some  Cognition Comment: delayed processing noted     LUE AROM (degrees)  LUE AROM : Exceptions  LUE General AROM: Pt limited shoulder flexion 0-100 , elbow / wrist intact   Left Hand AROM (degrees)  Left Hand AROM: WFL  RUE AROM (degrees)  RUE AROM : WFL  Right Hand AROM (degrees)  Right Hand AROM: WFL  LUE Strength  Gross LUE Strength: WFL  L Hand General: 4/5  RUE Strength  Gross RUE Strength: WFL  R Hand General: 4/5     Hand Dominance  Hand Dominance: Right     Plan  This note will serve as a discharge summary if patient is discharged from hospital before next treatment session.   Plan  Times per week: 2-5x  Times per day: Daily  Current Treatment Recommendations: Functional Mobility Training, Self-Care / ADL, Patient/Caregiver Education & Training, Endurance Training, Safety Education & Training    AM-PAC Score  AM-[facility] Inpatient Daily Activity Raw Score: 15 (01/07/20 1507)  AM-PAC Inpatient ADL T-Scale Score : 34.69 (01/07/20 1507)  ADL Inpatient CMS 0-100% Score: 56.46 (01/07/20 1507)  ADL Inpatient CMS G-Code Modifier : CK (01/07/20 1507)    Goals  Short term goals  Time Frame for Short term goals: at d/c   Short term goal 1: Stance x 6 mins with CGA for Hygiene   Short term goal 2: Commode transfers with Min A   Short term goal 3: toileting with Min A   Short term goal 4: Chair pushups x 5 reps with Min A   Patient Goals   Patient goals : Not stated      Therapy Time   Individual Concurrent Group Co-treatment   Time In 1393         Time Out 1454         Minutes 39              Timed Code Treatment Minutes:   24 mins     Total Treatment Minutes:  44 mins       Milly Jo, OT

## 2023-09-29 NOTE — TELEPHONE ENCOUNTER
MD carlos
OK
Pt fell and was taking to [facility] he is also wheezing, and feet  are swollen. Please advise nephew called to let the dr,. Know.
coffee

## 2024-01-24 NOTE — ED PROVIDER NOTES
[facility] RESIDENT NOTE       Date of evaluation: 2/27/2021    Chief Complaint     Shortness of Breath      History of Present Illness     Luciana Arnett is a 80 y.o. male who presents with shortness of breath, hypoxia    Patient presents from [address] with reported shortness of breath and hypoxia on room air to the 80s there. He was recently hospitalized and review of the chart shows that during that evaluation he was found to have generalized debility secondary to advanced age. He had been referred to the SNF for this reason. Limited echo obtained during that stay showed intact ejection fraction although there was concern for an underlying HFpEF. The patient is unable to provide any history secondary to persistent increased work of breathing and severe dyspnea. Review of Systems     Review of Systems       ROS:   Unable to obtain due to patient condition: Altered mental status, increased work of breathing. 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 He has a past surgical history that includes hernia repair; capsulotomy (Bilateral, 11/2011); skin biopsy (Right, 11/2012); Skin cancer excision (Right, 10/2010); vitrectomy (Right, 6/6/2007); Cataract removal with implant (Left, 10/6/2006); Colonoscopy (1/11/05); Coronary angioplasty with stent (11/2016); and Upper gastrointestinal endoscopy (N/A, 1/6/2020). His family history is not on file. He reports that he has never smoked.  He has never used smokeless tobacco. He reports current alcohol use of about 1.0 standard drinks of alcohol per week. He reports that he does not use drugs. Medications     Previous Medications    ACETAMINOPHEN (TYLENOL) 325 MG TABLET    Take 2 tablets by mouth every 6 hours as needed for Pain    APIXABAN (ELIQUIS) 2.5 MG TABS TABLET    Take 1 tablet by mouth 2 times daily    ATORVASTATIN (LIPITOR) 80 MG TABLET    TAKE 1 TABLET BY MOUTH ONE TIME A DAY     FINASTERIDE (PROSCAR) 5 MG TABLET    Take 1 tablet by mouth daily    FUROSEMIDE (LASIX) 40 MG TABLET    TAKE 1 & 1/2 (ONE & ONE-HALF) TABLETS BY MOUTH ONCE DAILY IN THE MORNING    OMEPRAZOLE (PRILOSEC OTC) 20 MG TABLET    1 daily to protect stomach. POLYETHYLENE GLYCOL (GLYCOLAX) POWDER    Take as directed every other day for constipation    POTASSIUM CHLORIDE (KLOR-CON M) 10 MEQ EXTENDED RELEASE TABLET    Take 2 tablets by mouth daily    TAMSULOSIN (FLOMAX) 0.4 MG CAPSULE    TAKE 1 CAPSULE BY MOUTH ONCE DAILY IN THE EVENING FOR PROSTATE AND URINATION    TRAMADOL (ULTRAM) 50 MG TABLET    Take 50 mg by mouth every 6 hours as needed for Pain. VITAMIN B-12 (CYANOCOBALAMIN) 1000 MCG TABLET    Take OTC B12 or similar B-complex vitamins 1xz=3090 mcg a day for neurological health and as a natural energy booster (read a bottle of 5 Hour Energy or diet Red Bull). High B12 levels are proven to help prevent dementia & neuropathy. Allergies     He is allergic to aspirin. Physical Exam     INITIAL VITALS: BP: 96/76, Temp: 97.7 °F (36.5 °C), Pulse: 112, Resp: 16, SpO2: 93 %   Physical Exam      Vitals:    02/27/21 1130 02/27/21 1200 02/27/21 1330 02/27/21 1430   BP: 124/83 (!) 133/90 106/72    Pulse: 100 105 97    Resp: (!) 35 22 25    Temp:       TempSrc:       SpO2: 96%  93% 99%   Weight:       Height:           General:  Cachectic male, Ill appearing. No acute distress. Eyes:  Pupils equally round, reactive, brisk. No discharge from eyes. ENT:  No discharge from nose. OP clear. Mild thick green-yellow secretions seen in oropharynx.   Neck: Supple. Nontender. Pulmonary:   Increased WOB. No obvious focal rhonci or mild crackles. Tachypneic. Recurrent cough. Cardiac:  irreg irreg rhythm, tachycardic rate. No definite murmurs. Abdomen:  Soft. Non-tender. Non-distended. No masses. Musculoskeletal:  No long bone deformity. No ankle or wrist deformity. Vascular:  Extremities warm and perfused. Radial pulses 2+ bilaterally. Skin:  No rash. Warm. Scattered old ecchymosis. Neuro: Alert and oriented to person, situation. Limited verbal speech 2/2 recurrent cough, increased WOB. Does follow commands. Does TISH. Sensation grossly intact to light touch. Extremities:  No peripheral edema. LE symmetric. DiagnosticResults     EKG   Interpreted in conjunction with emergencydepartment physician Mulu Nelson MD  Rhythm: atrial fibrillation - rapid  Rate: 104  Axis: normal (62)  Ectopy: none  Conduction: right bundle branch block (incomplete)  ST Segments: no acute change  T Waves:no acute change  Q Waves: nonspecific  Clinical Impression: new RVR for AFib otherwise no acute changes and non-specific EKG, limited by baseline artifact but best possible after multiple attempts 2/2 patients's respiratory effort and WOB. Comparison:  2/22/2021    RADIOLOGY:  CT HEAD WO CONTRAST   Final Result      1. No findings for acute intracranial abnormality. 2.  Age-related atrophy with extensive periventricular deep white matter changes bilaterally consistent with chronic small vessel ischemia. 3.  Multiple remote infarcts as described above. 4.  Remote bilateral basal ganglia lacunar infarcts. IMPRESSION:      CT CHEST ABDOMEN PELVIS W CONTRAST   Final Result      CHEST:      1. Airspace disease in the lower lobes as well as the left upper lobe. This could reflect new infection within the lung parenchyma. The areas of airspace disease in the right upper lobe and right middle lobe seen on prior examination has resolved.    2. Mild mediastinal adenopathy, likely reactive. 3. Cardiomegaly and pulmonary arterial enlargement, stable. ABDOMEN/PELVIS:      1. Moderate degree of stool in the rectum. 2. Advanced sclerosis. 3. Otherwise no acute abnormality.       XR CHEST PORTABLE   Final Result   No acute disease in the chest.                LABS:   Results for orders placed or performed during the hospital encounter of 02/27/21   CBC Auto Differential   Result Value Ref Range    WBC 29.5 (H) 4.0 - 11.0 K/uL    RBC 5.08 4.20 - 5.90 M/uL    Hemoglobin 15.9 13.5 - 17.5 g/dL    Hematocrit 48.6 40.5 - 52.5 %    MCV 95.6 80.0 - 100.0 fL    MCH 31.2 26.0 - 34.0 pg    MCHC 32.7 31.0 - 36.0 g/dL    RDW 15.2 12.4 - 15.4 %    Platelets see below 708 - 450 K/uL    MPV see below 5.0 - 10.5 fL    PLATELET SLIDE REVIEW Clumped     Neutrophils % 95.0 %    Lymphocytes % 1.0 %    Monocytes % 4.0 %    Eosinophils % 0.0 %    Basophils % 0.0 %    Neutrophils Absolute 28.0 (H) 1.7 - 7.7 K/uL    Lymphocytes Absolute 0.3 (L) 1.0 - 5.1 K/uL    Monocytes Absolute 1.2 0.0 - 1.3 K/uL    Eosinophils Absolute 0.0 0.0 - 0.6 K/uL    Basophils Absolute 0.0 0.0 - 0.2 K/uL    Anisocytosis 1+ (A)     Ovalocytes 1+ (A)    Comprehensive Metabolic Panel w/ Reflex to MG   Result Value Ref Range    Sodium 137 136 - 145 mmol/L    Potassium reflex Magnesium 4.0 3.5 - 5.1 mmol/L    Chloride 98 (L) 99 - 110 mmol/L    CO2 27 21 - 32 mmol/L    Anion Gap 12 3 - 16    Glucose 102 (H) 70 - 99 mg/dL    BUN 25 (H) 7 - 20 mg/dL    CREATININE 1.1 0.8 - 1.3 mg/dL    GFR Non-African American >60 >60    GFR African American >60 >60    Calcium 9.8 8.3 - 10.6 mg/dL    Total Protein 6.3 (L) 6.4 - 8.2 g/dL    Albumin 3.3 (L) 3.4 - 5.0 g/dL    Albumin/Globulin Ratio 1.1 1.1 - 2.2    Total Bilirubin 1.9 (H) 0.0 - 1.0 mg/dL    Alkaline Phosphatase 221 (H) 40 - 129 U/L    ALT 16 10 - 40 U/L    AST 28 15 - 37 U/L    Globulin 3.0 g/dL   Troponin   Result Value Ref Range    Troponin 0.05 (H) <0.01 ng/mL   Brain Natriuretic Peptide   Result Value Ref Range    Pro-BNP 2,865 (H) 0 - 449 pg/mL   Blood Gas, Venous   Result Value Ref Range    pH, Carl 7.388 7.350 - 7.450    pCO2, Carl 50.8 41.0 - 51.0 mmHg    pO2, Carl 27.3 25.0 - 40.0 mmHg    HCO3, Venous 30.6 (H) 24.0 - 28.0 mmol/L    Base Excess, Carl 4.2 (H) -2.0 - 3.0 mmol/L    O2 Sat, Carl 44 Not established %    Carboxyhemoglobin 1.2 0.0 - 1.5 %    MetHgb, Carl 0.2 0.0 - 1.5 %    TC02 (Calc), Carl 32 mmol/L   Lactate, Sepsis   Result Value Ref Range    Lactic Acid, Sepsis 3.7 (H) 0.4 - 1.9 mmol/L   Lactate, Sepsis   Result Value Ref Range    Lactic Acid, Sepsis 2.8 (H) 0.4 - 1.9 mmol/L   Lipase   Result Value Ref Range    Lipase 13.0 13.0 - 60.0 U/L   Urinalysis Reflex to Culture    Specimen: Urine, clean catch   Result Value Ref Range    Color, UA Yellow Straw/Yellow    Clarity, UA Clear Clear    Glucose, Ur Negative Negative mg/dL    Bilirubin Urine Negative Negative    Ketones, Urine Negative Negative mg/dL    Specific Gravity, UA 1.015 1.005 - 1.030    Blood, Urine LARGE (A) Negative    pH, UA 6.0 5.0 - 8.0    Protein, UA Negative Negative mg/dL    Urobilinogen, Urine 1.0 <2.0 E.U./dL    Nitrite, Urine Negative Negative    Leukocyte Esterase, Urine SMALL (A) Negative    Microscopic Examination YES     Urine Type NotGiven     Urine Reflex to Culture Not Indicated    Troponin   Result Value Ref Range    Troponin <0.01 <0.01 ng/mL   Microscopic Urinalysis   Result Value Ref Range    WBC, UA 6-9 (A) 0 - 5 /HPF    RBC, UA >100 (A) 0 - 4 /HPF    Epithelial Cells, UA 2-5 0 - 5 /HPF    Bacteria, UA 4+ (A) None Seen /HPF   EKG 12 Lead   Result Value Ref Range    Ventricular Rate 104 BPM    Atrial Rate 92 BPM    QRS Duration 102 ms    Q-T Interval 278 ms    QTc Calculation (Bazett) 365 ms    R Axis 62 degrees    T Axis -19 degrees    Diagnosis       EKG performed in ER and to be interpreted by ER physician. Confirmed by MD, ER ([id number]),  Suzie Leon ([id number]) on mild elevated bilirubin and otherwise unremarkable. Blood cultures sent x2. Mild elevation in proBNP but no other clinical signs for HF exacerbation. Given profound leukocytosis, did proceed to CT chest abdomen pelvis to further assess for etiology of sepsis. This confirmed pneumonia. UA with evidence of possible UTI as well. Discussed with Dr Zulma Mann covering for Dr Kajal Antonio who agreed with admission, discussed with AOD; accepted for medicine. Discussed with wenceslao Glover who identifies as POA and confirms full code and OK for intubation. Clinical Impression     1. Pneumonia due to organism        Disposition     PATIENT REFERRED TO:  No follow-up provider specified.     DISCHARGE MEDICATIONS:  New Prescriptions    No medications on file       DISPOSITION    Decision to admit     Ramonita Shields MD  Resident  02/27/21 9549 Add 52 Modifier (Optional): no

## 2024-05-08 NOTE — ASSESSMENT & PLAN NOTE
Family concerned he may have had a small stroke 4 months ago. He has been a little more difficult to understand. Appetite and interaction is okay, but he does sleep more. Check labs today and then may order CT or MRI.   Also consult for PT OT and speech eval.
No overt symptoms of failure. Admittedly he is fairly inactive.
Noted on labs from last summer. Check isoenzymes. May need further evaluation.
Remains anticoagulated
full weight-bearing